# Patient Record
Sex: FEMALE | Race: WHITE | Employment: OTHER | URBAN - METROPOLITAN AREA
[De-identification: names, ages, dates, MRNs, and addresses within clinical notes are randomized per-mention and may not be internally consistent; named-entity substitution may affect disease eponyms.]

---

## 2018-04-25 ENCOUNTER — TELEPHONE (OUTPATIENT)
Dept: FAMILY MEDICINE CLINIC | Age: 68
End: 2018-04-25

## 2018-05-17 ENCOUNTER — OFFICE VISIT (OUTPATIENT)
Dept: FAMILY MEDICINE CLINIC | Age: 68
End: 2018-05-17
Payer: MEDICARE

## 2018-05-17 VITALS
WEIGHT: 210.8 LBS | BODY MASS INDEX: 35.99 KG/M2 | SYSTOLIC BLOOD PRESSURE: 136 MMHG | TEMPERATURE: 99.3 F | RESPIRATION RATE: 16 BRPM | DIASTOLIC BLOOD PRESSURE: 72 MMHG | HEIGHT: 64 IN | HEART RATE: 82 BPM | OXYGEN SATURATION: 96 %

## 2018-05-17 DIAGNOSIS — E03.9 ACQUIRED HYPOTHYROIDISM: ICD-10-CM

## 2018-05-17 DIAGNOSIS — I10 ESSENTIAL HYPERTENSION: Primary | ICD-10-CM

## 2018-05-17 DIAGNOSIS — E78.2 MIXED HYPERLIPIDEMIA: ICD-10-CM

## 2018-05-17 DIAGNOSIS — E11.59 TYPE 2 DIABETES MELLITUS WITH OTHER CIRCULATORY COMPLICATION, UNSPECIFIED LONG TERM INSULIN USE STATUS: ICD-10-CM

## 2018-05-17 DIAGNOSIS — I48.0 PAF (PAROXYSMAL ATRIAL FIBRILLATION) (HCC): ICD-10-CM

## 2018-05-17 PROCEDURE — G8417 CALC BMI ABV UP PARAM F/U: HCPCS | Performed by: FAMILY MEDICINE

## 2018-05-17 PROCEDURE — G8427 DOCREV CUR MEDS BY ELIG CLIN: HCPCS | Performed by: FAMILY MEDICINE

## 2018-05-17 PROCEDURE — 3017F COLORECTAL CA SCREEN DOC REV: CPT | Performed by: FAMILY MEDICINE

## 2018-05-17 PROCEDURE — 1036F TOBACCO NON-USER: CPT | Performed by: FAMILY MEDICINE

## 2018-05-17 PROCEDURE — G8400 PT W/DXA NO RESULTS DOC: HCPCS | Performed by: FAMILY MEDICINE

## 2018-05-17 PROCEDURE — 99214 OFFICE O/P EST MOD 30 MIN: CPT | Performed by: FAMILY MEDICINE

## 2018-05-17 PROCEDURE — 3044F HG A1C LEVEL LT 7.0%: CPT | Performed by: FAMILY MEDICINE

## 2018-05-17 PROCEDURE — 1090F PRES/ABSN URINE INCON ASSESS: CPT | Performed by: FAMILY MEDICINE

## 2018-05-17 PROCEDURE — 1123F ACP DISCUSS/DSCN MKR DOCD: CPT | Performed by: FAMILY MEDICINE

## 2018-05-17 PROCEDURE — 4040F PNEUMOC VAC/ADMIN/RCVD: CPT | Performed by: FAMILY MEDICINE

## 2018-05-17 PROCEDURE — 2022F DILAT RTA XM EVC RTNOPTHY: CPT | Performed by: FAMILY MEDICINE

## 2018-05-17 RX ORDER — FUROSEMIDE 20 MG/1
20 TABLET ORAL DAILY
Qty: 30 TABLET | Refills: 3 | Status: SHIPPED | OUTPATIENT
Start: 2018-05-17 | End: 2019-01-22

## 2018-09-12 LAB
BASOPHILS ABSOLUTE: NORMAL /ΜL
BASOPHILS RELATIVE PERCENT: NORMAL %
EOSINOPHILS ABSOLUTE: NORMAL /ΜL
EOSINOPHILS RELATIVE PERCENT: NORMAL %
HCT VFR BLD CALC: 45.8 % (ref 36–46)
HEMOGLOBIN: 14.8 G/DL (ref 12–16)
LYMPHOCYTES ABSOLUTE: NORMAL /ΜL
LYMPHOCYTES RELATIVE PERCENT: NORMAL %
MCH RBC QN AUTO: NORMAL PG
MCHC RBC AUTO-ENTMCNC: NORMAL G/DL
MCV RBC AUTO: NORMAL FL
MONOCYTES ABSOLUTE: NORMAL /ΜL
MONOCYTES RELATIVE PERCENT: NORMAL %
NEUTROPHILS ABSOLUTE: NORMAL /ΜL
NEUTROPHILS RELATIVE PERCENT: NORMAL %
PLATELET # BLD: NORMAL K/ΜL
PMV BLD AUTO: NORMAL FL
RBC # BLD: NORMAL 10^6/ΜL
WBC # BLD: NORMAL 10^3/ML

## 2018-10-09 DIAGNOSIS — I10 ESSENTIAL HYPERTENSION: ICD-10-CM

## 2018-10-11 PROBLEM — I10 ESSENTIAL HYPERTENSION: Status: ACTIVE | Noted: 2018-10-11

## 2019-01-07 ENCOUNTER — TELEPHONE (OUTPATIENT)
Dept: FAMILY MEDICINE CLINIC | Age: 69
End: 2019-01-07

## 2019-01-07 DIAGNOSIS — Z02.71 DISABILITY EXAMINATION: Primary | ICD-10-CM

## 2019-01-15 ENCOUNTER — TELEPHONE (OUTPATIENT)
Dept: FAMILY MEDICINE CLINIC | Age: 69
End: 2019-01-15

## 2019-01-15 RX ORDER — AMOXICILLIN 500 MG/1
4 TABLET, FILM COATED ORAL ONCE
Qty: 4 TABLET | Refills: 0 | Status: SHIPPED | OUTPATIENT
Start: 2019-01-15 | End: 2019-01-15

## 2019-01-22 ENCOUNTER — OFFICE VISIT (OUTPATIENT)
Dept: FAMILY MEDICINE CLINIC | Age: 69
End: 2019-01-22
Payer: MEDICARE

## 2019-01-22 ENCOUNTER — HOSPITAL ENCOUNTER (OUTPATIENT)
Age: 69
Discharge: HOME | End: 2019-01-24
Payer: MEDICARE

## 2019-01-22 VITALS
OXYGEN SATURATION: 94 % | RESPIRATION RATE: 16 BRPM | BODY MASS INDEX: 37.56 KG/M2 | SYSTOLIC BLOOD PRESSURE: 128 MMHG | TEMPERATURE: 98 F | DIASTOLIC BLOOD PRESSURE: 80 MMHG | HEART RATE: 93 BPM | WEIGHT: 220 LBS | HEIGHT: 64 IN

## 2019-01-22 DIAGNOSIS — E11.21 TYPE 2 DIABETES MELLITUS WITH DIABETIC NEPHROPATHY, WITHOUT LONG-TERM CURRENT USE OF INSULIN (HCC): ICD-10-CM

## 2019-01-22 DIAGNOSIS — B37.2 CANDIDAL INTERTRIGO: ICD-10-CM

## 2019-01-22 DIAGNOSIS — N18.30 CHRONIC KIDNEY DISEASE, STAGE III (MODERATE) (HCC): ICD-10-CM

## 2019-01-22 DIAGNOSIS — E66.09 CLASS 2 OBESITY DUE TO EXCESS CALORIES WITH BODY MASS INDEX (BMI) OF 37.0 TO 37.9 IN ADULT, UNSPECIFIED WHETHER SERIOUS COMORBIDITY PRESENT: ICD-10-CM

## 2019-01-22 DIAGNOSIS — R73.03 PREDIABETES: ICD-10-CM

## 2019-01-22 DIAGNOSIS — I10 ESSENTIAL HYPERTENSION: Primary | ICD-10-CM

## 2019-01-22 DIAGNOSIS — E11.59 TYPE 2 DIABETES MELLITUS WITH OTHER CIRCULATORY COMPLICATION, UNSPECIFIED WHETHER LONG TERM INSULIN USE (HCC): ICD-10-CM

## 2019-01-22 LAB
CHOLESTEROL, TOTAL: 228 MG/DL (ref 0–199)
HBA1C MFR BLD: 6.3 % (ref 4–5.6)
HDLC SERPL-MCNC: 38 MG/DL
LDL CHOLESTEROL CALCULATED: 113 MG/DL (ref 0–99)
TRIGL SERPL-MCNC: 383 MG/DL (ref 0–149)
VLDLC SERPL CALC-MCNC: 77 MG/DL

## 2019-01-22 PROCEDURE — G8400 PT W/DXA NO RESULTS DOC: HCPCS | Performed by: FAMILY MEDICINE

## 2019-01-22 PROCEDURE — 3017F COLORECTAL CA SCREEN DOC REV: CPT | Performed by: FAMILY MEDICINE

## 2019-01-22 PROCEDURE — 1101F PT FALLS ASSESS-DOCD LE1/YR: CPT | Performed by: FAMILY MEDICINE

## 2019-01-22 PROCEDURE — 83036 HEMOGLOBIN GLYCOSYLATED A1C: CPT

## 2019-01-22 PROCEDURE — 4040F PNEUMOC VAC/ADMIN/RCVD: CPT | Performed by: FAMILY MEDICINE

## 2019-01-22 PROCEDURE — G8427 DOCREV CUR MEDS BY ELIG CLIN: HCPCS | Performed by: FAMILY MEDICINE

## 2019-01-22 PROCEDURE — 1036F TOBACCO NON-USER: CPT | Performed by: FAMILY MEDICINE

## 2019-01-22 PROCEDURE — 2022F DILAT RTA XM EVC RTNOPTHY: CPT | Performed by: FAMILY MEDICINE

## 2019-01-22 PROCEDURE — 1123F ACP DISCUSS/DSCN MKR DOCD: CPT | Performed by: FAMILY MEDICINE

## 2019-01-22 PROCEDURE — 3044F HG A1C LEVEL LT 7.0%: CPT | Performed by: FAMILY MEDICINE

## 2019-01-22 PROCEDURE — 99214 OFFICE O/P EST MOD 30 MIN: CPT | Performed by: FAMILY MEDICINE

## 2019-01-22 PROCEDURE — 1090F PRES/ABSN URINE INCON ASSESS: CPT | Performed by: FAMILY MEDICINE

## 2019-01-22 PROCEDURE — 80061 LIPID PANEL: CPT

## 2019-01-22 PROCEDURE — G8417 CALC BMI ABV UP PARAM F/U: HCPCS | Performed by: FAMILY MEDICINE

## 2019-01-22 PROCEDURE — G8484 FLU IMMUNIZE NO ADMIN: HCPCS | Performed by: FAMILY MEDICINE

## 2019-01-22 RX ORDER — NYSTATIN 100000 [USP'U]/G
POWDER TOPICAL
Qty: 60 G | Refills: 0 | Status: SHIPPED
Start: 2019-01-22 | End: 2022-07-27

## 2019-01-22 RX ORDER — NYSTATIN 100000 [USP'U]/G
POWDER TOPICAL
Qty: 60 G | Refills: 0 | Status: SHIPPED | OUTPATIENT
Start: 2019-01-22 | End: 2019-01-22 | Stop reason: SDUPTHER

## 2019-01-22 ASSESSMENT — ENCOUNTER SYMPTOMS
BACK PAIN: 0
BLOOD IN STOOL: 0
PHOTOPHOBIA: 0
VOICE CHANGE: 0
COUGH: 0
SORE THROAT: 0
CONSTIPATION: 1
DIARRHEA: 0
TROUBLE SWALLOWING: 0
SINUS PRESSURE: 1
CHEST TIGHTNESS: 0
SHORTNESS OF BREATH: 1
RHINORRHEA: 1
EYE PAIN: 0
NAUSEA: 1
WHEEZING: 0
VOMITING: 0
ABDOMINAL PAIN: 0

## 2019-01-22 ASSESSMENT — PATIENT HEALTH QUESTIONNAIRE - PHQ9
SUM OF ALL RESPONSES TO PHQ QUESTIONS 1-9: 0
1. LITTLE INTEREST OR PLEASURE IN DOING THINGS: 0
SUM OF ALL RESPONSES TO PHQ QUESTIONS 1-9: 0
SUM OF ALL RESPONSES TO PHQ9 QUESTIONS 1 & 2: 0
2. FEELING DOWN, DEPRESSED OR HOPELESS: 0

## 2019-06-03 ENCOUNTER — OFFICE VISIT (OUTPATIENT)
Dept: FAMILY MEDICINE CLINIC | Age: 69
End: 2019-06-03
Payer: MEDICARE

## 2019-06-03 VITALS
HEART RATE: 70 BPM | HEIGHT: 64 IN | BODY MASS INDEX: 38.07 KG/M2 | TEMPERATURE: 98.1 F | DIASTOLIC BLOOD PRESSURE: 84 MMHG | SYSTOLIC BLOOD PRESSURE: 128 MMHG | RESPIRATION RATE: 16 BRPM | OXYGEN SATURATION: 95 % | WEIGHT: 223 LBS

## 2019-06-03 DIAGNOSIS — I48.20 CHRONIC ATRIAL FIBRILLATION (HCC): ICD-10-CM

## 2019-06-03 DIAGNOSIS — E78.2 ELEVATED TRIGLYCERIDES WITH HIGH CHOLESTEROL: ICD-10-CM

## 2019-06-03 DIAGNOSIS — R73.9 ELEVATED BLOOD SUGAR: ICD-10-CM

## 2019-06-03 DIAGNOSIS — R73.03 PREDIABETES: Primary | ICD-10-CM

## 2019-06-03 LAB
CREATININE URINE POCT: 200
HBA1C MFR BLD: 6.3 %
MICROALBUMIN/CREAT 24H UR: 150 MG/G{CREAT}
MICROALBUMIN/CREAT UR-RTO: NORMAL

## 2019-06-03 PROCEDURE — G8427 DOCREV CUR MEDS BY ELIG CLIN: HCPCS | Performed by: FAMILY MEDICINE

## 2019-06-03 PROCEDURE — 3017F COLORECTAL CA SCREEN DOC REV: CPT | Performed by: FAMILY MEDICINE

## 2019-06-03 PROCEDURE — 83036 HEMOGLOBIN GLYCOSYLATED A1C: CPT | Performed by: FAMILY MEDICINE

## 2019-06-03 PROCEDURE — 1090F PRES/ABSN URINE INCON ASSESS: CPT | Performed by: FAMILY MEDICINE

## 2019-06-03 PROCEDURE — 99214 OFFICE O/P EST MOD 30 MIN: CPT | Performed by: FAMILY MEDICINE

## 2019-06-03 PROCEDURE — 1123F ACP DISCUSS/DSCN MKR DOCD: CPT | Performed by: FAMILY MEDICINE

## 2019-06-03 PROCEDURE — G8400 PT W/DXA NO RESULTS DOC: HCPCS | Performed by: FAMILY MEDICINE

## 2019-06-03 PROCEDURE — G8417 CALC BMI ABV UP PARAM F/U: HCPCS | Performed by: FAMILY MEDICINE

## 2019-06-03 PROCEDURE — 1036F TOBACCO NON-USER: CPT | Performed by: FAMILY MEDICINE

## 2019-06-03 PROCEDURE — 82044 UR ALBUMIN SEMIQUANTITATIVE: CPT | Performed by: FAMILY MEDICINE

## 2019-06-03 PROCEDURE — 4040F PNEUMOC VAC/ADMIN/RCVD: CPT | Performed by: FAMILY MEDICINE

## 2019-06-03 NOTE — PROGRESS NOTES
6/3/2019    Chief Complaint   Patient presents with    Hypertension     routine medication check up        Gabriella Perez is a 71 y.o. patient that presents today for:    Pre-Diabetes Mellitus: Patient presents with elevated fasting blood sugar and evelated A1C. Symptoms: none. Symptoms have stabilized. Patient denies foot ulcerations, polydipsia, polyuria and visual disturbances. Evaluation to date has been included: hemoglobin A1C. Home sugars: patient does not check sugars. Treatment to date: no recent interventions. Current A1C is . Last A1C was . Aortic Valve Replacement/Atrial fibrillation:  Patient is here to follow up regarding a fib.  This is a/an chronic problem.  It is  stable and controlled at this time. Joyce Bello history of cardioversion or ablation.  Current anticoagulation includes none.  Patient does not have any active bleeding. Patient does not have chest pain, shortness of breath, palpitations, dizziness, light headedness, or syncope.  She does not see a Cardiologist regularly.  Last Cardiology progress note is  in medical record and, if available, was reviewed.  Last EKG done July 2017 and reviewed in medical record. REFUSE anticoag as they follow advice of company that manufactures her geovanna valve ring. Did follow-up with Cardiology and Vascular surgery. 1/22/19: Did have IVC filter removed on 9/27/18 per [facility] radiology. Has not had follow-up with Vascular surgery. No issues with a. Fib. No CP, diaphoresis, SOB, palp, HA, visual issues. She does not smoke. Patient's past medical, surgical, social and/or family history reviewed, updated in chart, and are non-contributory (unless otherwise stated). Medications and allergies also reviewed and updated in chart.     ROS Unless otherwise specified  Review of Systems - General ROS: negative for - chills, fatigue, fever, night sweats, sleep disturbance, weight gain or weight loss  Psychological ROS: negative for - anxiety, behavioral disorder, depression, hallucinations, irritability, memory difficulties, mood swings, sleep disturbances or suicidal ideation  ENT ROS: negative for - epistaxis, headaches, hearing change, nasal congestion, nasal discharge, nasal polyps, sinus pain, tinnitus, vertigo or visual changes  Hematological and Lymphatic ROS: negative for - bleeding problems, blood clots, fatigue or swollen lymph nodes  Respiratory ROS: negative for - cough, orthopnea, shortness of breath, sputum changes, tachypnea or wheezing  Cardiovascular ROS: negative for - chest pain, dyspnea on exertion, irregular heartbeat, loss of consciousness, palpitations, paroxysmal nocturnal dyspnea or rapid heart rate  Gastrointestinal ROS: negative for - abdominal pain, blood in stools, change in bowel habits, constipation, diarrhea, gas/bloating, heartburn or nausea/vomiting  Musculoskeletal ROS: negative for - joint pain, joint stiffness, joint swelling or muscle, back pain, bowel or bladder incontinence  Neurological ROS: negative for - behavioral changes, confusion, dizziness, headaches, memory loss, numbness/tingling, seizures or speech problems, weakness  Dermatological ROS: negative for - dry skin, mole changes, nail changes, pruritus, rash or skin lesion changes    Physical Exam  Wt Readings from Last 3 Encounters:   06/03/19 223 lb (101.2 kg)   01/22/19 220 lb (99.8 kg)   05/17/18 210 lb 12.8 oz (95.6 kg)     Temp Readings from Last 3 Encounters:   06/03/19 98.1 °F (36.7 °C)   01/22/19 98 °F (36.7 °C)   05/17/18 99.3 °F (37.4 °C)     BP Readings from Last 3 Encounters:   06/03/19 128/84   01/22/19 128/80   05/17/18 136/72     Pulse Readings from Last 3 Encounters:   06/03/19 70   01/22/19 93   05/17/18 82       General appearance: alert, well appearing, and in no distress, oriented to person, place, and time and normal appearing weight. CVS exam: normal rate, IRregular rhythm, normal S1, S2, no murmurs, rubs, clicks or gallops.   Radial pulses 2+ bilateral.  PT/DP pulse 2+ bilat. No C/C/E    Chest: clear to auscultation, no wheezes, rales or rhonchi, symmetric air entry. Abdomen: Soft, non-tender, non-distended, positive BS in all 4 quadrants    Extremities:Dorsalis pedis pulses palpated bilaterally, no clubbing, cyanosis, edema or erythema     SKIN: warm, dry, no lesions, jaundice, petechiae, pallor, cyanosis, ecchymosis    NEURO: gross motor exam normal by observation, gait normal    Mental status - alert, oriented to person, place, and time, normal mood, behavior, speech, dress, motor activity, and thought processes      Assessment/Plan  Michael Maria was seen today for hypertension. Diagnoses and all orders for this visit:    Prediabetes  -     POCT glycosylated hemoglobin (Hb A1C)  -     POCT microalbumin  -     Hemoglobin A1C; Future    Chronic atrial fibrillation (HCC)  -  NO anticoagulation, does REFUSE    Elevated blood sugar  -     CBC; Future  -     Comprehensive Metabolic Panel; Future  -     Hemoglobin A1C; Future    Elevated triglycerides with high cholesterol  -     Lipid Panel; Future    Quality & Risk Score Accuracy    Visit Dx:  I48.0 - PAF (paroxysmal atrial fibrillation) (HCC)  Assessment and plan:  Stable based upon symptoms and exam. Continue current treatment plan and follow up at least yearly. Visit Dx:  I48.2 - Chronic atrial fibrillation (HCC)  Assessment and plan:  Stable based upon symptoms and exam. Continue current treatment plan and follow up at least yearly. Last edited 06/03/19 13:12 EDT by Lindsey Lakhani, DO             Return in about 6 months (around 12/3/2019), or if symptoms worsen or fail to improve. Call or go to ED immediately if symptoms worsen or persist.    Educational materials and/or home exercises printed for patient's review and were included in patient instructions on his/her After Visit Summary and given to patient at the end of visit.        Counseled regarding above diagnosis, including possible risks and complications,  especially if left uncontrolled. Counseled regarding the possible side effects, risks, benefits and alternatives to treatment; patient and/or guardian verbalizes understanding, agrees, feels comfortable with and wishes to proceed with above treatment plan. Advised patient to call with any new medication issues, and read all Rx info from pharmacy to assure aware of all possible risks and side effects of medication before taking. Reviewed age and gender appropriate health screening exams and vaccinations. Advised patient regarding importance of keeping up with recommended health maintenance and to schedule as soon as possible if overdue, as this is important in assessing for undiagnosed pathology, especially cancer, as well as protecting against potentially harmful/life threatening disease. Patient and/or guardian verbalizes understanding and agrees with above counseling, assessment and plan. All questions answered.       Lu Garcia, DO

## 2019-12-31 ENCOUNTER — OFFICE VISIT (OUTPATIENT)
Dept: FAMILY MEDICINE CLINIC | Age: 69
End: 2019-12-31
Payer: MEDICARE

## 2019-12-31 VITALS
BODY MASS INDEX: 39.1 KG/M2 | DIASTOLIC BLOOD PRESSURE: 78 MMHG | HEART RATE: 67 BPM | RESPIRATION RATE: 16 BRPM | TEMPERATURE: 97.5 F | OXYGEN SATURATION: 98 % | SYSTOLIC BLOOD PRESSURE: 118 MMHG | WEIGHT: 227.8 LBS

## 2019-12-31 DIAGNOSIS — N39.0 URINARY TRACT INFECTION WITHOUT HEMATURIA, SITE UNSPECIFIED: Primary | ICD-10-CM

## 2019-12-31 LAB
BILIRUBIN, POC: NORMAL
BLOOD URINE, POC: NORMAL
CLARITY, POC: CLEAR
COLOR, POC: NORMAL
GLUCOSE URINE, POC: NORMAL
KETONES, POC: NORMAL
LEUKOCYTE EST, POC: NORMAL
NITRITE, POC: NORMAL
PH, POC: 5
PROTEIN, POC: NORMAL
SPECIFIC GRAVITY, POC: >=1.03
UROBILINOGEN, POC: 0.2

## 2019-12-31 PROCEDURE — 1036F TOBACCO NON-USER: CPT | Performed by: FAMILY MEDICINE

## 2019-12-31 PROCEDURE — 1123F ACP DISCUSS/DSCN MKR DOCD: CPT | Performed by: FAMILY MEDICINE

## 2019-12-31 PROCEDURE — 99213 OFFICE O/P EST LOW 20 MIN: CPT | Performed by: FAMILY MEDICINE

## 2019-12-31 PROCEDURE — G8400 PT W/DXA NO RESULTS DOC: HCPCS | Performed by: FAMILY MEDICINE

## 2019-12-31 PROCEDURE — 4040F PNEUMOC VAC/ADMIN/RCVD: CPT | Performed by: FAMILY MEDICINE

## 2019-12-31 PROCEDURE — G8417 CALC BMI ABV UP PARAM F/U: HCPCS | Performed by: FAMILY MEDICINE

## 2019-12-31 PROCEDURE — G8427 DOCREV CUR MEDS BY ELIG CLIN: HCPCS | Performed by: FAMILY MEDICINE

## 2019-12-31 PROCEDURE — 81002 URINALYSIS NONAUTO W/O SCOPE: CPT | Performed by: FAMILY MEDICINE

## 2019-12-31 PROCEDURE — G8484 FLU IMMUNIZE NO ADMIN: HCPCS | Performed by: FAMILY MEDICINE

## 2019-12-31 PROCEDURE — 1090F PRES/ABSN URINE INCON ASSESS: CPT | Performed by: FAMILY MEDICINE

## 2019-12-31 PROCEDURE — 3017F COLORECTAL CA SCREEN DOC REV: CPT | Performed by: FAMILY MEDICINE

## 2020-05-30 ENCOUNTER — APPOINTMENT (OUTPATIENT)
Dept: CT IMAGING | Age: 70
End: 2020-05-30
Payer: MEDICARE

## 2020-05-30 ENCOUNTER — HOSPITAL ENCOUNTER (EMERGENCY)
Age: 70
Discharge: HOME | End: 2020-05-30
Attending: EMERGENCY MEDICINE
Payer: MEDICARE

## 2020-05-30 VITALS
HEART RATE: 70 BPM | OXYGEN SATURATION: 95 % | DIASTOLIC BLOOD PRESSURE: 67 MMHG | WEIGHT: 228 LBS | BODY MASS INDEX: 38.93 KG/M2 | RESPIRATION RATE: 16 BRPM | TEMPERATURE: 97.8 F | HEIGHT: 64 IN | SYSTOLIC BLOOD PRESSURE: 140 MMHG

## 2020-05-30 LAB
ALBUMIN SERPL-MCNC: 4.1 G/DL (ref 3.5–5.2)
ALP BLD-CCNC: 67 U/L (ref 35–104)
ALT SERPL-CCNC: 17 U/L (ref 0–32)
AMORPHOUS: ABNORMAL
ANION GAP SERPL CALCULATED.3IONS-SCNC: 8 MMOL/L (ref 7–16)
AST SERPL-CCNC: 16 U/L (ref 0–31)
BACTERIA: ABNORMAL /HPF
BASOPHILS ABSOLUTE: 0.04 E9/L (ref 0–0.2)
BASOPHILS RELATIVE PERCENT: 0.4 % (ref 0–2)
BILIRUB SERPL-MCNC: 0.4 MG/DL (ref 0–1.2)
BILIRUBIN URINE: ABNORMAL
BLOOD, URINE: ABNORMAL
BUN BLDV-MCNC: 23 MG/DL (ref 8–23)
CALCIUM SERPL-MCNC: 9.4 MG/DL (ref 8.6–10.2)
CHLORIDE BLD-SCNC: 99 MMOL/L (ref 98–107)
CLARITY: CLEAR
CO2: 27 MMOL/L (ref 22–29)
COLOR: YELLOW
CREAT SERPL-MCNC: 1.5 MG/DL (ref 0.5–1)
EOSINOPHILS ABSOLUTE: 0.1 E9/L (ref 0.05–0.5)
EOSINOPHILS RELATIVE PERCENT: 0.9 % (ref 0–6)
EPITHELIAL CELLS, UA: ABNORMAL /HPF
GFR AFRICAN AMERICAN: 42
GFR NON-AFRICAN AMERICAN: 34 ML/MIN/1.73
GLUCOSE BLD-MCNC: 138 MG/DL (ref 74–99)
GLUCOSE URINE: NEGATIVE MG/DL
HCT VFR BLD CALC: 46.2 % (ref 34–48)
HEMOGLOBIN: 15.2 G/DL (ref 11.5–15.5)
IMMATURE GRANULOCYTES #: 0.05 E9/L
IMMATURE GRANULOCYTES %: 0.4 % (ref 0–5)
KETONES, URINE: NEGATIVE MG/DL
LACTIC ACID: 0.9 MMOL/L (ref 0.5–2.2)
LEUKOCYTE ESTERASE, URINE: ABNORMAL
LYMPHOCYTES ABSOLUTE: 1.04 E9/L (ref 1.5–4)
LYMPHOCYTES RELATIVE PERCENT: 9.3 % (ref 20–42)
MCH RBC QN AUTO: 30.8 PG (ref 26–35)
MCHC RBC AUTO-ENTMCNC: 32.9 % (ref 32–34.5)
MCV RBC AUTO: 93.7 FL (ref 80–99.9)
MONOCYTES ABSOLUTE: 0.84 E9/L (ref 0.1–0.95)
MONOCYTES RELATIVE PERCENT: 7.5 % (ref 2–12)
NEUTROPHILS ABSOLUTE: 9.16 E9/L (ref 1.8–7.3)
NEUTROPHILS RELATIVE PERCENT: 81.5 % (ref 43–80)
NITRITE, URINE: NEGATIVE
PDW BLD-RTO: 14.1 FL (ref 11.5–15)
PH UA: 6 (ref 5–9)
PLATELET # BLD: 194 E9/L (ref 130–450)
PMV BLD AUTO: 10.4 FL (ref 7–12)
POTASSIUM SERPL-SCNC: 4.6 MMOL/L (ref 3.5–5)
PROTEIN UA: >=300 MG/DL
RBC # BLD: 4.93 E12/L (ref 3.5–5.5)
RBC UA: ABNORMAL /HPF (ref 0–2)
SODIUM BLD-SCNC: 134 MMOL/L (ref 132–146)
SPECIFIC GRAVITY UA: 1.02 (ref 1–1.03)
TOTAL PROTEIN: 7.8 G/DL (ref 6.4–8.3)
UROBILINOGEN, URINE: 0.2 E.U./DL
WBC # BLD: 11.2 E9/L (ref 4.5–11.5)
WBC UA: ABNORMAL /HPF (ref 0–5)

## 2020-05-30 PROCEDURE — 2580000003 HC RX 258: Performed by: EMERGENCY MEDICINE

## 2020-05-30 PROCEDURE — 87088 URINE BACTERIA CULTURE: CPT

## 2020-05-30 PROCEDURE — 85025 COMPLETE CBC W/AUTO DIFF WBC: CPT

## 2020-05-30 PROCEDURE — 81001 URINALYSIS AUTO W/SCOPE: CPT

## 2020-05-30 PROCEDURE — 83605 ASSAY OF LACTIC ACID: CPT

## 2020-05-30 PROCEDURE — 74176 CT ABD & PELVIS W/O CONTRAST: CPT

## 2020-05-30 PROCEDURE — 99284 EMERGENCY DEPT VISIT MOD MDM: CPT

## 2020-05-30 PROCEDURE — 6370000000 HC RX 637 (ALT 250 FOR IP): Performed by: EMERGENCY MEDICINE

## 2020-05-30 PROCEDURE — 80053 COMPREHEN METABOLIC PANEL: CPT

## 2020-05-30 PROCEDURE — 96375 TX/PRO/DX INJ NEW DRUG ADDON: CPT

## 2020-05-30 PROCEDURE — 6360000002 HC RX W HCPCS: Performed by: EMERGENCY MEDICINE

## 2020-05-30 PROCEDURE — 96374 THER/PROPH/DIAG INJ IV PUSH: CPT

## 2020-05-30 RX ORDER — CEFDINIR 300 MG/1
300 CAPSULE ORAL 2 TIMES DAILY
Qty: 14 CAPSULE | Refills: 0 | Status: SHIPPED | OUTPATIENT
Start: 2020-05-30 | End: 2020-06-06

## 2020-05-30 RX ORDER — ONDANSETRON 2 MG/ML
4 INJECTION INTRAMUSCULAR; INTRAVENOUS ONCE
Status: COMPLETED | OUTPATIENT
Start: 2020-05-30 | End: 2020-05-30

## 2020-05-30 RX ORDER — METRONIDAZOLE 500 MG/1
500 TABLET ORAL ONCE
Status: COMPLETED | OUTPATIENT
Start: 2020-05-30 | End: 2020-05-30

## 2020-05-30 RX ORDER — METRONIDAZOLE 500 MG/1
500 TABLET ORAL 4 TIMES DAILY
Qty: 28 TABLET | Refills: 0 | Status: SHIPPED | OUTPATIENT
Start: 2020-05-30 | End: 2020-06-06

## 2020-05-30 RX ORDER — KETOROLAC TROMETHAMINE 30 MG/ML
15 INJECTION, SOLUTION INTRAMUSCULAR; INTRAVENOUS ONCE
Status: COMPLETED | OUTPATIENT
Start: 2020-05-30 | End: 2020-05-30

## 2020-05-30 RX ADMIN — KETOROLAC TROMETHAMINE 15 MG: 30 INJECTION, SOLUTION INTRAMUSCULAR at 11:13

## 2020-05-30 RX ADMIN — METRONIDAZOLE 500 MG: 500 TABLET, FILM COATED ORAL at 12:49

## 2020-05-30 RX ADMIN — WATER 1 G: 1 INJECTION INTRAMUSCULAR; INTRAVENOUS; SUBCUTANEOUS at 12:49

## 2020-05-30 RX ADMIN — ONDANSETRON 4 MG: 2 INJECTION INTRAMUSCULAR; INTRAVENOUS at 11:13

## 2020-05-30 ASSESSMENT — PAIN DESCRIPTION - LOCATION: LOCATION: GROIN

## 2020-05-30 ASSESSMENT — PAIN SCALES - GENERAL: PAINLEVEL_OUTOF10: 3

## 2020-05-30 ASSESSMENT — PAIN DESCRIPTION - PAIN TYPE: TYPE: ACUTE PAIN

## 2020-05-30 NOTE — ED NOTES
Pt refusing to wear mask as she states she has a scarred lung and she will \"black out\" if she wears it     [facility].  [facility]  05/30/20 2754

## 2020-05-31 LAB — URINE CULTURE, ROUTINE: NORMAL

## 2020-06-16 ENCOUNTER — OFFICE VISIT (OUTPATIENT)
Dept: FAMILY MEDICINE CLINIC | Age: 70
End: 2020-06-16
Payer: MEDICARE

## 2020-06-16 VITALS
OXYGEN SATURATION: 94 % | TEMPERATURE: 98.1 F | WEIGHT: 226 LBS | HEIGHT: 64 IN | DIASTOLIC BLOOD PRESSURE: 70 MMHG | HEART RATE: 89 BPM | RESPIRATION RATE: 18 BRPM | SYSTOLIC BLOOD PRESSURE: 120 MMHG | BODY MASS INDEX: 38.58 KG/M2

## 2020-06-16 LAB — HBA1C MFR BLD: 6.7 %

## 2020-06-16 PROCEDURE — 4040F PNEUMOC VAC/ADMIN/RCVD: CPT | Performed by: FAMILY MEDICINE

## 2020-06-16 PROCEDURE — 83036 HEMOGLOBIN GLYCOSYLATED A1C: CPT | Performed by: FAMILY MEDICINE

## 2020-06-16 PROCEDURE — G8427 DOCREV CUR MEDS BY ELIG CLIN: HCPCS | Performed by: FAMILY MEDICINE

## 2020-06-16 PROCEDURE — 1090F PRES/ABSN URINE INCON ASSESS: CPT | Performed by: FAMILY MEDICINE

## 2020-06-16 PROCEDURE — 3044F HG A1C LEVEL LT 7.0%: CPT | Performed by: FAMILY MEDICINE

## 2020-06-16 PROCEDURE — 2022F DILAT RTA XM EVC RTNOPTHY: CPT | Performed by: FAMILY MEDICINE

## 2020-06-16 PROCEDURE — 3017F COLORECTAL CA SCREEN DOC REV: CPT | Performed by: FAMILY MEDICINE

## 2020-06-16 PROCEDURE — 1123F ACP DISCUSS/DSCN MKR DOCD: CPT | Performed by: FAMILY MEDICINE

## 2020-06-16 PROCEDURE — 1036F TOBACCO NON-USER: CPT | Performed by: FAMILY MEDICINE

## 2020-06-16 PROCEDURE — G8417 CALC BMI ABV UP PARAM F/U: HCPCS | Performed by: FAMILY MEDICINE

## 2020-06-16 PROCEDURE — G8400 PT W/DXA NO RESULTS DOC: HCPCS | Performed by: FAMILY MEDICINE

## 2020-06-16 PROCEDURE — 99214 OFFICE O/P EST MOD 30 MIN: CPT | Performed by: FAMILY MEDICINE

## 2020-06-16 ASSESSMENT — PATIENT HEALTH QUESTIONNAIRE - PHQ9
SUM OF ALL RESPONSES TO PHQ QUESTIONS 1-9: 0
SUM OF ALL RESPONSES TO PHQ QUESTIONS 1-9: 0
SUM OF ALL RESPONSES TO PHQ9 QUESTIONS 1 & 2: 0
2. FEELING DOWN, DEPRESSED OR HOPELESS: 0
1. LITTLE INTEREST OR PLEASURE IN DOING THINGS: 0

## 2020-06-16 NOTE — PROGRESS NOTES
ecchymosis    NEURO: gross motor exam normal by observation, gait normal    Mental status - alert, oriented to person, place, and time, normal mood, behavior, speech, dress, motor activity, and thought processes      Assessment/Plan  Zoya Dempsey was seen today for diverticulitis. Diagnoses and all orders for this visit:    Diabetes mellitus type 2 in obese ([address]  -     POCT glycosylated hemoglobin (Hb A1C)  - Refuse meds at this thime. SHe and her  have been eating poorly and she wishes to put more effort to diet and exercise. - ADA diets given and counseled on DM    Diverticulitis  Much Improved        I did spend more than 25 minutes in direct patient care discussing and treating above diagnoses. Return in about 3 months (around 9/16/2020). Lu Garcia, DO    Call or go to ED immediately if symptoms worsen or persist.    Educational materials and/or home exercises printed for patient's review and were included in patient instructions on his/her After Visit Summary and given to patient at the end of visit. Counseled regarding above diagnosis, including possible risks and complications,  especially if left uncontrolled. Counseled regarding the possible side effects, risks, benefits and alternatives to treatment; patient and/or guardian verbalizes understanding, agrees, feels comfortable with and wishes to proceed with above treatment plan. Advised patient to call with any new medication issues, and read all Rx info from pharmacy to assure aware of all possible risks and side effects of medication before taking. Reviewed age and gender appropriate health screening exams and vaccinations. Advised patient regarding importance of keeping up with recommended health maintenance and to schedule as soon as possible if overdue, as this is important in assessing for undiagnosed pathology, especially cancer, as well as protecting against potentially harmful/life threatening disease. Patient and/or guardian verbalizes understanding and agrees with above counseling, assessment and plan. All questions answered.

## 2020-07-30 ENCOUNTER — TELEPHONE (OUTPATIENT)
Dept: ADMINISTRATIVE | Age: 70
End: 2020-07-30

## 2020-07-30 NOTE — TELEPHONE ENCOUNTER
Pt has been in touch with Dr. Debora Roy, she needs a referral to a gynecologist at the [facility], pt states Dr knows about it. Please contact pt for further information or instructions.

## 2020-10-04 ENCOUNTER — HOSPITAL ENCOUNTER (EMERGENCY)
Age: 70
Discharge: HOME | End: 2020-10-05
Attending: EMERGENCY MEDICINE
Payer: MEDICARE

## 2020-10-04 PROCEDURE — 99282 EMERGENCY DEPT VISIT SF MDM: CPT

## 2020-10-04 PROCEDURE — 99283 EMERGENCY DEPT VISIT LOW MDM: CPT

## 2020-10-04 PROCEDURE — 96375 TX/PRO/DX INJ NEW DRUG ADDON: CPT

## 2020-10-04 PROCEDURE — 96374 THER/PROPH/DIAG INJ IV PUSH: CPT

## 2020-10-04 RX ORDER — MECLIZINE HYDROCHLORIDE 25 MG/1
TABLET ORAL 2 TIMES DAILY PRN
COMMUNITY
End: 2022-07-27

## 2020-10-04 ASSESSMENT — PAIN SCALES - GENERAL: PAINLEVEL_OUTOF10: 6

## 2020-10-05 ENCOUNTER — APPOINTMENT (OUTPATIENT)
Dept: GENERAL RADIOLOGY | Age: 70
End: 2020-10-05
Payer: MEDICARE

## 2020-10-05 ENCOUNTER — APPOINTMENT (OUTPATIENT)
Dept: CT IMAGING | Age: 70
End: 2020-10-05
Payer: MEDICARE

## 2020-10-05 VITALS
WEIGHT: 225 LBS | HEART RATE: 63 BPM | TEMPERATURE: 97.1 F | HEIGHT: 64 IN | OXYGEN SATURATION: 94 % | RESPIRATION RATE: 16 BRPM | SYSTOLIC BLOOD PRESSURE: 134 MMHG | BODY MASS INDEX: 38.41 KG/M2 | DIASTOLIC BLOOD PRESSURE: 66 MMHG

## 2020-10-05 LAB
ALBUMIN SERPL-MCNC: 3.9 G/DL (ref 3.5–5.2)
ALP BLD-CCNC: 80 U/L (ref 35–104)
ALT SERPL-CCNC: 18 U/L (ref 0–32)
ANION GAP SERPL CALCULATED.3IONS-SCNC: 10 MMOL/L (ref 7–16)
AST SERPL-CCNC: 16 U/L (ref 0–31)
BASOPHILS ABSOLUTE: 0.05 E9/L (ref 0–0.2)
BASOPHILS RELATIVE PERCENT: 0.7 % (ref 0–2)
BILIRUB SERPL-MCNC: <0.2 MG/DL (ref 0–1.2)
BUN BLDV-MCNC: 29 MG/DL (ref 8–23)
CALCIUM SERPL-MCNC: 9.4 MG/DL (ref 8.6–10.2)
CHLORIDE BLD-SCNC: 104 MMOL/L (ref 98–107)
CO2: 23 MMOL/L (ref 22–29)
CREAT SERPL-MCNC: 1.5 MG/DL (ref 0.5–1)
EKG ATRIAL RATE: 65 BPM
EKG Q-T INTERVAL: 484 MS
EKG QRS DURATION: 182 MS
EKG QTC CALCULATION (BAZETT): 511 MS
EKG R AXIS: 44 DEGREES
EKG T AXIS: 42 DEGREES
EKG VENTRICULAR RATE: 67 BPM
EOSINOPHILS ABSOLUTE: 0.24 E9/L (ref 0.05–0.5)
EOSINOPHILS RELATIVE PERCENT: 3.4 % (ref 0–6)
GFR AFRICAN AMERICAN: 41
GFR NON-AFRICAN AMERICAN: 34 ML/MIN/1.73
GLUCOSE BLD-MCNC: 139 MG/DL (ref 74–99)
HCT VFR BLD CALC: 44.8 % (ref 34–48)
HEMOGLOBIN: 14.4 G/DL (ref 11.5–15.5)
IMMATURE GRANULOCYTES #: 0.02 E9/L
IMMATURE GRANULOCYTES %: 0.3 % (ref 0–5)
LYMPHOCYTES ABSOLUTE: 1.35 E9/L (ref 1.5–4)
LYMPHOCYTES RELATIVE PERCENT: 19.1 % (ref 20–42)
MCH RBC QN AUTO: 30.3 PG (ref 26–35)
MCHC RBC AUTO-ENTMCNC: 32.1 % (ref 32–34.5)
MCV RBC AUTO: 94.1 FL (ref 80–99.9)
MONOCYTES ABSOLUTE: 0.5 E9/L (ref 0.1–0.95)
MONOCYTES RELATIVE PERCENT: 7.1 % (ref 2–12)
NEUTROPHILS ABSOLUTE: 4.9 E9/L (ref 1.8–7.3)
NEUTROPHILS RELATIVE PERCENT: 69.4 % (ref 43–80)
PDW BLD-RTO: 13.7 FL (ref 11.5–15)
PLATELET # BLD: 183 E9/L (ref 130–450)
PMV BLD AUTO: 10.1 FL (ref 7–12)
POTASSIUM REFLEX MAGNESIUM: 4.4 MMOL/L (ref 3.5–5)
RBC # BLD: 4.76 E12/L (ref 3.5–5.5)
SODIUM BLD-SCNC: 137 MMOL/L (ref 132–146)
TOTAL PROTEIN: 7.3 G/DL (ref 6.4–8.3)
TROPONIN: <0.01 NG/ML (ref 0–0.03)
WBC # BLD: 7.1 E9/L (ref 4.5–11.5)

## 2020-10-05 PROCEDURE — 84484 ASSAY OF TROPONIN QUANT: CPT

## 2020-10-05 PROCEDURE — 6360000004 HC RX CONTRAST MEDICATION: Performed by: RADIOLOGY

## 2020-10-05 PROCEDURE — 93005 ELECTROCARDIOGRAM TRACING: CPT | Performed by: EMERGENCY MEDICINE

## 2020-10-05 PROCEDURE — 2580000003 HC RX 258: Performed by: RADIOLOGY

## 2020-10-05 PROCEDURE — 70496 CT ANGIOGRAPHY HEAD: CPT

## 2020-10-05 PROCEDURE — 85025 COMPLETE CBC W/AUTO DIFF WBC: CPT

## 2020-10-05 PROCEDURE — 96374 THER/PROPH/DIAG INJ IV PUSH: CPT

## 2020-10-05 PROCEDURE — 2580000003 HC RX 258: Performed by: EMERGENCY MEDICINE

## 2020-10-05 PROCEDURE — 80053 COMPREHEN METABOLIC PANEL: CPT

## 2020-10-05 PROCEDURE — 96375 TX/PRO/DX INJ NEW DRUG ADDON: CPT

## 2020-10-05 PROCEDURE — 71045 X-RAY EXAM CHEST 1 VIEW: CPT

## 2020-10-05 PROCEDURE — 6360000002 HC RX W HCPCS: Performed by: EMERGENCY MEDICINE

## 2020-10-05 RX ORDER — 0.9 % SODIUM CHLORIDE 0.9 %
1000 INTRAVENOUS SOLUTION INTRAVENOUS ONCE
Status: COMPLETED | OUTPATIENT
Start: 2020-10-05 | End: 2020-10-05

## 2020-10-05 RX ORDER — FLUTICASONE PROPIONATE 50 MCG
2 SPRAY, SUSPENSION (ML) NASAL DAILY
Qty: 1 BOTTLE | Refills: 0 | Status: SHIPPED | OUTPATIENT
Start: 2020-10-05 | End: 2022-07-27 | Stop reason: ALTCHOICE

## 2020-10-05 RX ORDER — AMOXICILLIN AND CLAVULANATE POTASSIUM 875; 125 MG/1; MG/1
1 TABLET, FILM COATED ORAL 2 TIMES DAILY WITH MEALS
Qty: 20 TABLET | Refills: 0 | Status: SHIPPED | OUTPATIENT
Start: 2020-10-05 | End: 2020-10-15

## 2020-10-05 RX ORDER — DIPHENHYDRAMINE HYDROCHLORIDE 50 MG/ML
12.5 INJECTION INTRAMUSCULAR; INTRAVENOUS ONCE
Status: COMPLETED | OUTPATIENT
Start: 2020-10-05 | End: 2020-10-05

## 2020-10-05 RX ORDER — SODIUM CHLORIDE 0.9 % (FLUSH) 0.9 %
10 SYRINGE (ML) INJECTION 2 TIMES DAILY
Status: DISCONTINUED | OUTPATIENT
Start: 2020-10-05 | End: 2020-10-05 | Stop reason: HOSPADM

## 2020-10-05 RX ORDER — METOCLOPRAMIDE HYDROCHLORIDE 5 MG/ML
10 INJECTION INTRAMUSCULAR; INTRAVENOUS ONCE
Status: COMPLETED | OUTPATIENT
Start: 2020-10-05 | End: 2020-10-05

## 2020-10-05 RX ADMIN — DIPHENHYDRAMINE HYDROCHLORIDE 12.5 MG: 50 INJECTION, SOLUTION INTRAMUSCULAR; INTRAVENOUS at 00:38

## 2020-10-05 RX ADMIN — Medication 10 ML: at 01:32

## 2020-10-05 RX ADMIN — METOCLOPRAMIDE 10 MG: 5 INJECTION, SOLUTION INTRAMUSCULAR; INTRAVENOUS at 00:38

## 2020-10-05 RX ADMIN — SODIUM CHLORIDE 1000 ML: 9 INJECTION, SOLUTION INTRAVENOUS at 00:37

## 2020-10-05 RX ADMIN — IOPAMIDOL 80 ML: 755 INJECTION, SOLUTION INTRAVENOUS at 01:37

## 2020-10-05 NOTE — ED PROVIDER NOTES
HPI:  10/5/20,   Time: 12:09 AM EDT       Liz Oro is a 79 y.o. female presenting to the ED for headache and dizziness, beginning 3 days ago. The complaint has been intermittent, moderate in severity, and worsened by nothing. The patient states that she has been having dizziness intermittently for the entire last month. She states that currently she is not dizzy and has had no dizziness today. She states that today she came in because she had a headache. She states that over the last 3 days she has been having sharp stabbing pains on the left side of her face radiating back on the left posterior side of her head. She states that that is sharp and stabbing. She states that every night it has been occurring but it self resolves and throughout the day she has no symptoms. Due to the patient's pain tonight she came to the ED to be evaluated. She states that now her pain is subsiding and she is almost back to normal.  No numbness tingling. No focal neuro deficits. No chest pain shortness of breath. No other systemic symptoms. Review of Systems:   Pertinent positives and negatives are stated within HPI, all other systems reviewed and are negative.          --------------------------------------------- PAST HISTORY ---------------------------------------------  Past Medical History:  has a past medical history of A-fib ([address] ARF (acute renal failure) ([facility] [address] Cancer ([facility] [address] Cardiac dysfunction, Diabetes mellitus ([facility] [address] Diverticulitis, History of deep vein thrombosis, Hx of blood clots, Poor venous access, S/P AVR, S/P IVC filter, Traumatic seroma of left thigh ([address] and Ventilator dependent ([address] Past Surgical History:  has a past surgical history that includes Cardiac surgery; Gastrostomy tube placement (09/26/2016); thoracentesis (10/14/2016); Colonoscopy; Endoscopy, colon, diagnostic; Aortic valve replacement; and Vena Cava Filter Placement (Right, 10/14/2016).     Social History:  reports that she has never smoked. She has never used smokeless tobacco. She reports that she does not drink alcohol or use drugs. Family History: family history is not on file. The patients home medications have been reviewed. Allergies: Ciprofloxacin; Protamine; Hydrocodone-acetaminophen; Oxycodone-acetaminophen; and Vancomycin        ---------------------------------------------------PHYSICAL EXAM--------------------------------------    Constitutional/General: Alert and oriented x3, well appearing, non toxic in NAD  Head: Normocephalic and atraumatic. Mild tenderness palpation overlying the left frontal sinus, nasal congestion noted  Eyes: PERRL, EOMI, conjunctive normal, sclera non icteric  Mouth: Oropharynx clear, handling secretions, no trismus, no asymmetry of the posterior oropharynx or uvular edema  Neck: Supple, full ROM, non tender to palpation in the midline, no stridor, no crepitus, no meningeal signs  Respiratory: Lungs clear to auscultation bilaterally, no wheezes, rales, or rhonchi. Not in respiratory distress  Cardiovascular:  Regular rate. Regular rhythm. No murmurs, gallops, or rubs. 2+ distal pulses  Chest: No chest wall tenderness  GI:  Abdomen Soft, Non tender, Non distended. +BS. No organomegaly, no palpable masses,  No rebound, guarding, or rigidity. Musculoskeletal: Moves all extremities x 4. Warm and well perfused, no clubbing, cyanosis, or edema. Capillary refill <3 seconds  Integument: skin warm and dry. No rashes. Lymphatic: no lymphadenopathy noted  Neurologic: GCS 15, no focal deficits, symmetric strength 5/5 in the upper and lower extremities bilaterally  Psychiatric: Normal Affect    -------------------------------------------------- RESULTS -------------------------------------------------  I have personally reviewed all laboratory and imaging results for this patient. Results are listed below.      LABS:  Results for orders placed or performed during the hospital encounter of 10/04/20   CBC Auto Differential   Result Value Ref Range    WBC 7.1 4.5 - 11.5 E9/L    RBC 4.76 3.50 - 5.50 E12/L    Hemoglobin 14.4 11.5 - 15.5 g/dL    Hematocrit 44.8 34.0 - 48.0 %    MCV 94.1 80.0 - 99.9 fL    MCH 30.3 26.0 - 35.0 pg    MCHC 32.1 32.0 - 34.5 %    RDW 13.7 11.5 - 15.0 fL    Platelets 198 693 - 631 E9/L    MPV 10.1 7.0 - 12.0 fL    Neutrophils % 69.4 43.0 - 80.0 %    Immature Granulocytes % 0.3 0.0 - 5.0 %    Lymphocytes % 19.1 (L) 20.0 - 42.0 %    Monocytes % 7.1 2.0 - 12.0 %    Eosinophils % 3.4 0.0 - 6.0 %    Basophils % 0.7 0.0 - 2.0 %    Neutrophils Absolute 4.90 1.80 - 7.30 E9/L    Immature Granulocytes # 0.02 E9/L    Lymphocytes Absolute 1.35 (L) 1.50 - 4.00 E9/L    Monocytes Absolute 0.50 0.10 - 0.95 E9/L    Eosinophils Absolute 0.24 0.05 - 0.50 E9/L    Basophils Absolute 0.05 0.00 - 0.20 E9/L   Comprehensive Metabolic Panel w/ Reflex to MG   Result Value Ref Range    Sodium 137 132 - 146 mmol/L    Potassium reflex Magnesium 4.4 3.5 - 5.0 mmol/L    Chloride 104 98 - 107 mmol/L    CO2 23 22 - 29 mmol/L    Anion Gap 10 7 - 16 mmol/L    Glucose 139 (H) 74 - 99 mg/dL    BUN 29 (H) 8 - 23 mg/dL    CREATININE 1.5 (H) 0.5 - 1.0 mg/dL    GFR Non-African American 34 >=60 mL/min/1.73    GFR African American 41     Calcium 9.4 8.6 - 10.2 mg/dL    Total Protein 7.3 6.4 - 8.3 g/dL    Alb 3.9 3.5 - 5.2 g/dL    Total Bilirubin <0.2 0.0 - 1.2 mg/dL    Alkaline Phosphatase 80 35 - 104 U/L    ALT 18 0 - 32 U/L    AST 16 0 - 31 U/L   Troponin   Result Value Ref Range    Troponin <0.01 0.00 - 0.03 ng/mL       RADIOLOGY:  Interpreted by Radiologist.  CTA HEAD W CONTRAST   Final Result   Unremarkable CTA of the head. Moderate cerebral white matter chronic microvascular ischemic disease. XR CHEST PORTABLE   Final Result   No definite acute disease. EKG:  This EKG is signed and interpreted by the EP. EKG shows a fib at 67 bpm.  Right bundle branch block.   No ST elevations or depressions. No STEMI. Unchanged from previous    ------------------------- NURSING NOTES AND VITALS REVIEWED ---------------------------   The nursing notes within the ED encounter and vital signs as below have been reviewed by myself. BP (!) 157/73   Pulse 65   Temp 97.1 °F (36.2 °C) (Infrared)   Resp 16   Ht 5' 4\" (1.626 m)   Wt 225 lb (102.1 kg)   SpO2 93%   BMI 38.62 kg/m²   Oxygen Saturation Interpretation: Normal    The patients available past medical records and past encounters were reviewed. ------------------------------ ED COURSE/MEDICAL DECISION MAKING----------------------  Medications   sodium chloride flush 0.9 % injection 10 mL (10 mLs Intravenous Given 10/5/20 0132)   0.9 % sodium chloride bolus (1,000 mLs Intravenous New Bag 10/5/20 0037)   metoclopramide (REGLAN) injection 10 mg (10 mg Intravenous Given 10/5/20 0038)   diphenhydrAMINE (BENADRYL) injection 12.5 mg (12.5 mg Intravenous Given 10/5/20 0038)   iopamidol (ISOVUE-370) 76 % injection 80 mL (80 mLs Intravenous Given 10/5/20 0137)         ED COURSE:       Medical Decision Making: This is a 72-year-old female presented to the ED for headache and dizziness. Upon arrival to the ED the patient was hypertensive but otherwise stable vital signs. Patient did have a palpable tenderness over the left frontal sinus. Patient was neurologically intact with a benign exam otherwise. Patient given fluids Benadryl and Reglan for headache. She underwent laboratory work-up for dizziness which showed a normal CBC. Normal chemistry except for BUN/creatinine 29/1.5 which is the patient's baseline. Troponin negative. Chest x-ray unremarkable. EKG unchanged from previous. Due to the patient having 3 days of a headache the patient underwent CTA which was unremarkable for any acute findings. On reevaluation the patient's headache is resolved. Blood pressure improved. Patient feels much better.   She will be treated symptomatically at this time. Follow-up with PCP in 2 days. Return precautions given. Patient agrees with plan. I, Dr. Jovan Uribe, am the primary provider for this encounter    This patient's ED course included: a personal history and physicial examination, re-evaluation prior to disposition and multiple bedside re-evaluations    This patient has remained hemodynamically stable during their ED course. Re-Evaluations:             Re-evaluation. Patients symptoms are improving      Counseling: The emergency provider has spoken with the patient and discussed todays results, in addition to providing specific details for the plan of care and counseling regarding the diagnosis and prognosis. Questions are answered at this time and they are agreeable with the plan.       --------------------------------- IMPRESSION AND DISPOSITION ---------------------------------    IMPRESSION  1. Acute nonintractable headache, unspecified headache type    2. Dizziness    3. Acute non-recurrent frontal sinusitis        DISPOSITION  Disposition: Discharge to home  Patient condition is stable    NOTE: This report was transcribed using voice recognition software.  Every effort was made to ensure accuracy; however, inadvertent computerized transcription errors may be present        Mireya Kapadia DO  10/05/20 0208

## 2020-10-23 ENCOUNTER — TELEPHONE (OUTPATIENT)
Dept: FAMILY MEDICINE CLINIC | Age: 70
End: 2020-10-23

## 2021-12-11 ENCOUNTER — HOSPITAL ENCOUNTER (EMERGENCY)
Age: 71
Discharge: HOME | End: 2021-12-11
Payer: MEDICARE

## 2021-12-11 VITALS
HEIGHT: 64 IN | RESPIRATION RATE: 16 BRPM | WEIGHT: 220 LBS | TEMPERATURE: 97.4 F | HEART RATE: 70 BPM | OXYGEN SATURATION: 92 % | SYSTOLIC BLOOD PRESSURE: 178 MMHG | DIASTOLIC BLOOD PRESSURE: 98 MMHG | BODY MASS INDEX: 37.56 KG/M2

## 2021-12-11 DIAGNOSIS — K12.1 STOMATITIS: Primary | ICD-10-CM

## 2021-12-11 DIAGNOSIS — I10 ESSENTIAL HYPERTENSION: ICD-10-CM

## 2021-12-11 PROCEDURE — 99282 EMERGENCY DEPT VISIT SF MDM: CPT

## 2021-12-11 RX ORDER — CLOTRIMAZOLE 1 %
CREAM (GRAM) TOPICAL 2 TIMES DAILY
Status: DISCONTINUED | OUTPATIENT
Start: 2021-12-11 | End: 2021-12-11

## 2021-12-11 RX ORDER — KETOCONAZOLE 20 MG/G
CREAM TOPICAL
Qty: 30 G | Refills: 1 | Status: SHIPPED | OUTPATIENT
Start: 2021-12-11 | End: 2022-07-27

## 2021-12-11 ASSESSMENT — PAIN DESCRIPTION - LOCATION: LOCATION: MOUTH

## 2021-12-11 ASSESSMENT — PAIN DESCRIPTION - PAIN TYPE: TYPE: ACUTE PAIN

## 2021-12-11 NOTE — ED PROVIDER NOTES
[facility]  Department of Emergency Medicine   ED  Encounter Note  Admit Date/RoomTime: 2021  6:30 PM  ED Room: RUSS/RUSS    NAME: Ramesh Varghese  : 1950  MRN: [id number]     Chief Complaint:  Rash (pt having rash on both sides of mouth since MID SUMMER. states that it is starting to hurt. hasn't seen another physician yet for this )    History of Present Illness       Ramesh Varghese is a 70 y.o. old female who presents to the emergency department by private vehicle, for persistent of red, painful and dry area on the bilateral corners of her mouth which began 6 month(s) prior to arrival.  The symptoms were caused by unknown cause. Since onset the symptoms have been persistent. Prior history of similar episodes: No.  Her symptoms are associated with nothing additional and relieved by nothing. She denies any headache, neck pain, fever, chills, chest pain, shortness breath, cough, abdominal pain, nausea, vomiting, diarrhea, constipation, or dysuria. She states that she has not been evaluated by her physician for this complaint, but has tried bacitracin and vitamin D without improvement. ROS   Pertinent positives and negatives are stated within HPI, all other systems reviewed and are negative. Past Medical History:  has a past medical history of A-fib ([address] ARF (acute renal failure) ([address] Cancer ([address] Cardiac dysfunction, Diabetes mellitus ([address] Diverticulitis, History of deep vein thrombosis, Hx of blood clots, Poor venous access, S/P AVR, S/P IVC filter, Traumatic seroma of left thigh ([address] and Ventilator dependent ([address] Surgical History:  has a past surgical history that includes Cardiac surgery; Gastrostomy tube placement (2016); thoracentesis (10/14/2016); Colonoscopy; Endoscopy, colon, diagnostic; Aortic valve replacement; and Vena Cava Filter Placement (Right, 10/14/2016).     Social History:  reports that she has never smoked. She has never used smokeless tobacco. She reports that she does not drink alcohol and does not use drugs. Family History: family history is not on file. Allergies: Ciprofloxacin, Protamine, Hydrocodone-acetaminophen, Oxycodone-acetaminophen, and Vancomycin    Physical Exam   Oxygen Saturation Interpretation: Normal.        ED Triage Vitals [12/11/21 1615]   BP Temp Temp src Pulse Resp SpO2 Height Weight   (!) 208/98 97.4 °F (36.3 °C) -- 74 16 92 % 5' 4\" (1.626 m) 220 lb (99.8 kg)         Constitutional:  Alert, development consistent with age. HEENT:  NC/NT. Airway patent. Eyes:  PERRL, EOMI, no discharge. Ears:  TMs without perforation, injection, or bulging. External canals clear without exudate. Mouth:  Mucous membranes moist without lesions, tongue and gums normal.  Throat:  Pharynx without injection, exudate, or tonsillar hypertrophy. Airway patient. Neck:  Supple. No lymphadenopathy. Respiratory:  Clear to auscultation and breath sounds equal.  CV:  Regular rate and rhythm. GI:  Abdomen Soft, nontender, +BS. Integument:  Skin turgor: Normal.              no wounds, erythema, or swelling. Neurological:  Orientation age-appropriate unless noted elseware. Motor functions intact. Lab / Imaging Results   (All laboratory and radiology results have been personally reviewed by myself)  Labs:  No results found for this visit on 12/11/21. Imaging: All Radiology results interpreted by Radiologist unless otherwise noted. No orders to display       ED Course / Medical Decision Making   Medications - No data to display     Consults:   None    Procedures:   none    MDM:   Patient presented with ongoing rash to the external corners of her mouth that has been there for approximately 6 months per her. This has not gotten better with bacitracin and vitamin E treatments at home. She has the clinical presentation of a stomatitis. She otherwise appears well and nontoxic.   Her blood pressure was elevated at 178/98 manually, but she is asymptomatic of her hypertension. She is appropriate for discharge and outpatient follow-up. Prescribing her ketoconazole cream and also instructed her to use a barrier cream of over-the-counter a and D ointment. She is instructed to return the emergency department immediately with any new or worsening symptoms. Plan of Care/Counseling:  ROOSEVELT Morgan CNP reviewed today's visit with the patient in addition to providing specific details for the plan of care and counseling regarding the diagnosis and prognosis. Questions are answered at this time and are agreeable with the plan. Assessment      1. Stomatitis    2. Essential hypertension      Plan   Discharged home. Patient condition is good    New Medications     New Prescriptions    KETOCONAZOLE (NIZORAL) 2 % CREAM    Apply topically daily. Electronically signed by ROOSEVELT Morgan CNP   DD: 12/11/21  **This report was transcribed using voice recognition software. Every effort was made to ensure accuracy; however, inadvertent computerized transcription errors may be present.   END OF ED PROVIDER NOTE       ROOSEVELT Chapman CNP  12/11/21 [id number]

## 2022-01-19 ENCOUNTER — OFFICE VISIT (OUTPATIENT)
Dept: PRIMARY CARE CLINIC | Age: 72
End: 2022-01-19
Payer: MEDICARE

## 2022-01-19 VITALS
SYSTOLIC BLOOD PRESSURE: 189 MMHG | OXYGEN SATURATION: 99 % | HEART RATE: 83 BPM | BODY MASS INDEX: 37.56 KG/M2 | DIASTOLIC BLOOD PRESSURE: 90 MMHG | HEIGHT: 64 IN | RESPIRATION RATE: 18 BRPM | WEIGHT: 220 LBS | TEMPERATURE: 97.2 F

## 2022-01-19 DIAGNOSIS — B96.89 ACUTE BACTERIAL SINUSITIS: Primary | ICD-10-CM

## 2022-01-19 DIAGNOSIS — K12.1 STOMATITIS: ICD-10-CM

## 2022-01-19 DIAGNOSIS — J01.90 ACUTE BACTERIAL SINUSITIS: Primary | ICD-10-CM

## 2022-01-19 PROCEDURE — 3017F COLORECTAL CA SCREEN DOC REV: CPT | Performed by: NURSE PRACTITIONER

## 2022-01-19 PROCEDURE — G8427 DOCREV CUR MEDS BY ELIG CLIN: HCPCS | Performed by: NURSE PRACTITIONER

## 2022-01-19 PROCEDURE — 1090F PRES/ABSN URINE INCON ASSESS: CPT | Performed by: NURSE PRACTITIONER

## 2022-01-19 PROCEDURE — G8417 CALC BMI ABV UP PARAM F/U: HCPCS | Performed by: NURSE PRACTITIONER

## 2022-01-19 PROCEDURE — 4040F PNEUMOC VAC/ADMIN/RCVD: CPT | Performed by: NURSE PRACTITIONER

## 2022-01-19 PROCEDURE — 1123F ACP DISCUSS/DSCN MKR DOCD: CPT | Performed by: NURSE PRACTITIONER

## 2022-01-19 PROCEDURE — G8400 PT W/DXA NO RESULTS DOC: HCPCS | Performed by: NURSE PRACTITIONER

## 2022-01-19 PROCEDURE — 1036F TOBACCO NON-USER: CPT | Performed by: NURSE PRACTITIONER

## 2022-01-19 PROCEDURE — G8484 FLU IMMUNIZE NO ADMIN: HCPCS | Performed by: NURSE PRACTITIONER

## 2022-01-19 PROCEDURE — 99213 OFFICE O/P EST LOW 20 MIN: CPT | Performed by: NURSE PRACTITIONER

## 2022-01-19 RX ORDER — CEFUROXIME AXETIL 500 MG/1
500 TABLET ORAL 2 TIMES DAILY
Qty: 14 TABLET | Refills: 0 | Status: SHIPPED | OUTPATIENT
Start: 2022-01-19 | End: 2022-01-26

## 2022-01-19 RX ORDER — METHYLPREDNISOLONE 4 MG/1
TABLET ORAL
Qty: 1 KIT | Refills: 0 | Status: SHIPPED | OUTPATIENT
Start: 2022-01-19 | End: 2022-01-25

## 2022-01-19 NOTE — PROGRESS NOTES
Chief Complaint:   Sinus Problem and Rash (on mouth )      History of Present Illness   Source of history provided by:  patient. Tedi Schirmer is a 70 y.o. old female with a past medical history of:   Past Medical History:   Diagnosis Date    A-fib [facility])     [facility]) [facility] [address]     Cancer ([facility] [address]     lymphoma    Cardiac dysfunction     Diabetes mellitus ([facility] [address]     Diverticulitis     History of deep vein thrombosis 11/3/2016    Hx of blood clots     Poor venous access 11/4/2016    S/P AVR     S/P IVC filter 11/3/2016    Traumatic seroma of left thigh (HCC) 11/16/2016    Ventilator dependent ([facility] [address]         Pt presents to the ready care with congestion/sinus pressure/PND and rash to corners of mouth  for the past several days. No fever noted. Denies any N/V/D, abdominal pain, CP, progressive SOB, dizziness, or lethargy. Pt is requesting Ceftin to be prescribed. Pt has a h/o stomatitis       ROS    Unless otherwise stated in this report or unable to obtain because of the patient's clinical or mental status as evidenced by the medical record, this patients's positive and negative responses for Review of Systems, constitutional, psych, eyes, ENT, cardiovascular, respiratory, gastrointestinal, neurological, genitourinary, musculoskeletal, integument systems and systems related to the presenting problem are either stated in the preceding or were not pertinent or were negative for the symptoms and/or complaints related to the medical problem. Past Surgical History:  has a past surgical history that includes Cardiac surgery; Gastrostomy tube placement (09/26/2016); thoracentesis (10/14/2016); Colonoscopy; Endoscopy, colon, diagnostic; Aortic valve replacement; and Vena Cava Filter Placement (Right, 10/14/2016). Social History:  reports that she has never smoked. She has never used smokeless tobacco. She reports that she does not drink alcohol and does not use drugs.   Family History: family history is not on file. Allergies: Ciprofloxacin, Protamine, Hydrocodone-acetaminophen, Oxycodone-acetaminophen, and Vancomycin    Physical Exam         VS:  BP (!) 189/90   Pulse 83   Temp 97.2 °F (36.2 °C) (Temporal)   Resp 18   Ht 5' 4\" (1.626 m)   Wt 220 lb (99.8 kg)   SpO2 99%   BMI 37.76 kg/m²    Oxygen Saturation Interpretation: Normal.    Constitutional:  Alert, development consistent with age. Ears:  External Ears: Normal bilateral pinna. TM's & External Canals: TM's normal bilaterally without perforation. Canals without erythema or drainage. Nose:   There is no obvious septal defect. Moderate redness/edema, sinus tenderness present  Mouth:  Moist bucca mucosa and normal tongue. Throat: Mild posterior pharyngeal erythema without exudates or lesions. Neck:  Supple. There is no obvious adenopathy or neck tenderness. Lungs:   Breath sounds: Normal chest expansion and breath sounds noted throughout. No wheezes, rales, or rhonchi noted. Heart:  Regular rate and rhythm, normal heart sounds, without pathological murmurs, ectopy, gallops, or rubs. Skin:  Normal turgor. Mild/modertae redness or corners of mouth, skin intact, no lesions or vesicles present  Neurological:  Oriented. Motor functions intact. Lab / Imaging Results   (All laboratory and radiology results have been personally reviewed by myself)  Labs:  No results found for this visit on 01/19/22. Imaging: All Radiology results interpreted by Radiologist unless otherwise noted. No orders to display         Assessment / Plan     Impression(s):  1. Acute bacterial sinusitis    2. Stomatitis      Disposition:  Disposition: Ceftin and MDP as ordered      ER if changes or worse. Advised to take all medications as directed.

## 2022-07-27 ENCOUNTER — HOSPITAL ENCOUNTER (INPATIENT)
Age: 72
LOS: 2 days | Discharge: HOME | DRG: 864 | End: 2022-07-29
Attending: STUDENT IN AN ORGANIZED HEALTH CARE EDUCATION/TRAINING PROGRAM | Admitting: FAMILY MEDICINE
Payer: MEDICARE

## 2022-07-27 ENCOUNTER — APPOINTMENT (OUTPATIENT)
Dept: CT IMAGING | Age: 72
DRG: 864 | End: 2022-07-27
Payer: MEDICARE

## 2022-07-27 ENCOUNTER — APPOINTMENT (OUTPATIENT)
Dept: GENERAL RADIOLOGY | Age: 72
DRG: 864 | End: 2022-07-27
Payer: MEDICARE

## 2022-07-27 DIAGNOSIS — R50.9 FEVER OF UNKNOWN ORIGIN: Primary | ICD-10-CM

## 2022-07-27 PROBLEM — D72.829 LEUKOCYTOSIS: Status: ACTIVE | Noted: 2022-07-27

## 2022-07-27 PROBLEM — N18.32 STAGE 3B CHRONIC KIDNEY DISEASE (HCC): Status: ACTIVE | Noted: 2022-07-27

## 2022-07-27 LAB
ALBUMIN SERPL-MCNC: 4.2 G/DL (ref 3.5–5.2)
ALP BLD-CCNC: 72 U/L (ref 35–104)
ALT SERPL-CCNC: 17 U/L (ref 0–32)
ANION GAP SERPL CALCULATED.3IONS-SCNC: 13 MMOL/L (ref 7–16)
AST SERPL-CCNC: 16 U/L (ref 0–31)
BACTERIA: ABNORMAL /HPF
BASOPHILS ABSOLUTE: 0.04 E9/L (ref 0–0.2)
BASOPHILS RELATIVE PERCENT: 0.3 % (ref 0–2)
BILIRUB SERPL-MCNC: 0.4 MG/DL (ref 0–1.2)
BILIRUBIN URINE: NEGATIVE
BLOOD, URINE: ABNORMAL
BUN BLDV-MCNC: 33 MG/DL (ref 6–23)
CALCIUM SERPL-MCNC: 9.6 MG/DL (ref 8.6–10.2)
CHLORIDE BLD-SCNC: 102 MMOL/L (ref 98–107)
CHOLESTEROL, TOTAL: 152 MG/DL (ref 0–199)
CLARITY: CLEAR
CO2: 26 MMOL/L (ref 22–29)
COLOR: YELLOW
CREAT SERPL-MCNC: 1.4 MG/DL (ref 0.5–1)
EKG ATRIAL RATE: 96 BPM
EKG P AXIS: -51 DEGREES
EKG P-R INTERVAL: 116 MS
EKG Q-T INTERVAL: 414 MS
EKG QRS DURATION: 178 MS
EKG QTC CALCULATION (BAZETT): 506 MS
EKG R AXIS: -90 DEGREES
EKG T AXIS: 50 DEGREES
EKG VENTRICULAR RATE: 90 BPM
EOSINOPHILS ABSOLUTE: 0.14 E9/L (ref 0.05–0.5)
EOSINOPHILS RELATIVE PERCENT: 1 % (ref 0–6)
EPITHELIAL CELLS, UA: ABNORMAL /HPF
GFR AFRICAN AMERICAN: 45
GFR NON-AFRICAN AMERICAN: 37 ML/MIN/1.73
GLUCOSE BLD-MCNC: 127 MG/DL (ref 74–99)
GLUCOSE URINE: NEGATIVE MG/DL
HCT VFR BLD CALC: 45.1 % (ref 34–48)
HDLC SERPL-MCNC: 44 MG/DL
HEMOGLOBIN: 14.9 G/DL (ref 11.5–15.5)
IMMATURE GRANULOCYTES #: 0.04 E9/L
IMMATURE GRANULOCYTES %: 0.3 % (ref 0–5)
INFLUENZA A BY PCR: NOT DETECTED
INFLUENZA B BY PCR: NOT DETECTED
KETONES, URINE: NEGATIVE MG/DL
LACTIC ACID, SEPSIS: 0.9 MMOL/L (ref 0.5–1.9)
LDL CHOLESTEROL CALCULATED: 76 MG/DL (ref 0–99)
LEUKOCYTE ESTERASE, URINE: NEGATIVE
LYMPHOCYTES ABSOLUTE: 0.96 E9/L (ref 1.5–4)
LYMPHOCYTES RELATIVE PERCENT: 7.2 % (ref 20–42)
MCH RBC QN AUTO: 30.7 PG (ref 26–35)
MCHC RBC AUTO-ENTMCNC: 33 % (ref 32–34.5)
MCV RBC AUTO: 93 FL (ref 80–99.9)
MONOCYTES ABSOLUTE: 0.79 E9/L (ref 0.1–0.95)
MONOCYTES RELATIVE PERCENT: 5.9 % (ref 2–12)
NEUTROPHILS ABSOLUTE: 11.4 E9/L (ref 1.8–7.3)
NEUTROPHILS RELATIVE PERCENT: 85.3 % (ref 43–80)
NITRITE, URINE: NEGATIVE
PDW BLD-RTO: 13.9 FL (ref 11.5–15)
PH UA: 6 (ref 5–9)
PLATELET # BLD: 192 E9/L (ref 130–450)
PMV BLD AUTO: 10.2 FL (ref 7–12)
POTASSIUM REFLEX MAGNESIUM: 4.4 MMOL/L (ref 3.5–5)
PRO-BNP: 427 PG/ML (ref 0–125)
PROTEIN UA: >=300 MG/DL
RBC # BLD: 4.85 E12/L (ref 3.5–5.5)
RBC UA: ABNORMAL /HPF (ref 0–2)
SARS-COV-2, NAAT: NOT DETECTED
SODIUM BLD-SCNC: 141 MMOL/L (ref 132–146)
SPECIFIC GRAVITY UA: 1.02 (ref 1–1.03)
TOTAL PROTEIN: 7.6 G/DL (ref 6.4–8.3)
TRIGL SERPL-MCNC: 160 MG/DL (ref 0–149)
TROPONIN, HIGH SENSITIVITY: 24 NG/L (ref 0–9)
TROPONIN, HIGH SENSITIVITY: 25 NG/L (ref 0–9)
UROBILINOGEN, URINE: 0.2 E.U./DL
VLDLC SERPL CALC-MCNC: 32 MG/DL
WBC # BLD: 13.4 E9/L (ref 4.5–11.5)
WBC UA: ABNORMAL /HPF (ref 0–5)

## 2022-07-27 PROCEDURE — 85025 COMPLETE CBC W/AUTO DIFF WBC: CPT

## 2022-07-27 PROCEDURE — 87635 SARS-COV-2 COVID-19 AMP PRB: CPT

## 2022-07-27 PROCEDURE — 71250 CT THORAX DX C-: CPT

## 2022-07-27 PROCEDURE — 82728 ASSAY OF FERRITIN: CPT

## 2022-07-27 PROCEDURE — 84484 ASSAY OF TROPONIN QUANT: CPT

## 2022-07-27 PROCEDURE — 36415 COLL VENOUS BLD VENIPUNCTURE: CPT

## 2022-07-27 PROCEDURE — 6360000002 HC RX W HCPCS: Performed by: STUDENT IN AN ORGANIZED HEALTH CARE EDUCATION/TRAINING PROGRAM

## 2022-07-27 PROCEDURE — 96375 TX/PRO/DX INJ NEW DRUG ADDON: CPT

## 2022-07-27 PROCEDURE — 87502 INFLUENZA DNA AMP PROBE: CPT

## 2022-07-27 PROCEDURE — 99285 EMERGENCY DEPT VISIT HI MDM: CPT

## 2022-07-27 PROCEDURE — 2580000003 HC RX 258: Performed by: FAMILY MEDICINE

## 2022-07-27 PROCEDURE — 85651 RBC SED RATE NONAUTOMATED: CPT

## 2022-07-27 PROCEDURE — 2500000003 HC RX 250 WO HCPCS: Performed by: FAMILY MEDICINE

## 2022-07-27 PROCEDURE — 2580000003 HC RX 258: Performed by: STUDENT IN AN ORGANIZED HEALTH CARE EDUCATION/TRAINING PROGRAM

## 2022-07-27 PROCEDURE — 81001 URINALYSIS AUTO W/SCOPE: CPT

## 2022-07-27 PROCEDURE — 96374 THER/PROPH/DIAG INJ IV PUSH: CPT

## 2022-07-27 PROCEDURE — 6370000000 HC RX 637 (ALT 250 FOR IP): Performed by: FAMILY MEDICINE

## 2022-07-27 PROCEDURE — 2060000000 HC ICU INTERMEDIATE R&B

## 2022-07-27 PROCEDURE — 71045 X-RAY EXAM CHEST 1 VIEW: CPT

## 2022-07-27 PROCEDURE — 84145 PROCALCITONIN (PCT): CPT

## 2022-07-27 PROCEDURE — 83605 ASSAY OF LACTIC ACID: CPT

## 2022-07-27 PROCEDURE — 6360000002 HC RX W HCPCS: Performed by: FAMILY MEDICINE

## 2022-07-27 PROCEDURE — 74176 CT ABD & PELVIS W/O CONTRAST: CPT

## 2022-07-27 PROCEDURE — 80053 COMPREHEN METABOLIC PANEL: CPT

## 2022-07-27 PROCEDURE — 80061 LIPID PANEL: CPT

## 2022-07-27 PROCEDURE — 87040 BLOOD CULTURE FOR BACTERIA: CPT

## 2022-07-27 PROCEDURE — 93005 ELECTROCARDIOGRAM TRACING: CPT | Performed by: STUDENT IN AN ORGANIZED HEALTH CARE EDUCATION/TRAINING PROGRAM

## 2022-07-27 PROCEDURE — 83880 ASSAY OF NATRIURETIC PEPTIDE: CPT

## 2022-07-27 RX ORDER — NIFEDIPINE 60 MG/1
60 TABLET, EXTENDED RELEASE ORAL NIGHTLY
COMMUNITY
End: 2022-07-27 | Stop reason: ALTCHOICE

## 2022-07-27 RX ORDER — NIFEDIPINE 30 MG/1
60 TABLET, FILM COATED, EXTENDED RELEASE ORAL NIGHTLY
Status: DISCONTINUED | OUTPATIENT
Start: 2022-07-27 | End: 2022-07-28

## 2022-07-27 RX ORDER — ALBUTEROL SULFATE 90 UG/1
2 AEROSOL, METERED RESPIRATORY (INHALATION) EVERY 4 HOURS PRN
COMMUNITY

## 2022-07-27 RX ORDER — KETOROLAC TROMETHAMINE 30 MG/ML
15 INJECTION, SOLUTION INTRAMUSCULAR; INTRAVENOUS ONCE
Status: COMPLETED | OUTPATIENT
Start: 2022-07-27 | End: 2022-07-27

## 2022-07-27 RX ORDER — SODIUM CHLORIDE 0.9 % (FLUSH) 0.9 %
5-40 SYRINGE (ML) INJECTION EVERY 12 HOURS SCHEDULED
Status: DISCONTINUED | OUTPATIENT
Start: 2022-07-27 | End: 2022-07-29 | Stop reason: HOSPADM

## 2022-07-27 RX ORDER — ONDANSETRON 4 MG/1
4 TABLET, ORALLY DISINTEGRATING ORAL EVERY 8 HOURS PRN
Status: DISCONTINUED | OUTPATIENT
Start: 2022-07-27 | End: 2022-07-27

## 2022-07-27 RX ORDER — 0.9 % SODIUM CHLORIDE 0.9 %
1000 INTRAVENOUS SOLUTION INTRAVENOUS ONCE
Status: COMPLETED | OUTPATIENT
Start: 2022-07-27 | End: 2022-07-27

## 2022-07-27 RX ORDER — M-VIT,TX,IRON,MINS/CALC/FOLIC 27MG-0.4MG
1 TABLET ORAL DAILY
COMMUNITY

## 2022-07-27 RX ORDER — FUROSEMIDE 40 MG/1
40 TABLET ORAL NIGHTLY
Status: CANCELLED | OUTPATIENT
Start: 2022-07-27

## 2022-07-27 RX ORDER — ACETAMINOPHEN 325 MG/1
650 TABLET ORAL EVERY 6 HOURS PRN
Status: DISCONTINUED | OUTPATIENT
Start: 2022-07-27 | End: 2022-07-29 | Stop reason: HOSPADM

## 2022-07-27 RX ORDER — ENOXAPARIN SODIUM 100 MG/ML
40 INJECTION SUBCUTANEOUS DAILY
Status: CANCELLED | OUTPATIENT
Start: 2022-07-27

## 2022-07-27 RX ORDER — SODIUM CHLORIDE 0.9 % (FLUSH) 0.9 %
5-40 SYRINGE (ML) INJECTION PRN
Status: DISCONTINUED | OUTPATIENT
Start: 2022-07-27 | End: 2022-07-29 | Stop reason: HOSPADM

## 2022-07-27 RX ORDER — SODIUM CHLORIDE 9 MG/ML
INJECTION, SOLUTION INTRAVENOUS PRN
Status: DISCONTINUED | OUTPATIENT
Start: 2022-07-27 | End: 2022-07-29 | Stop reason: HOSPADM

## 2022-07-27 RX ORDER — FUROSEMIDE 40 MG/1
40 TABLET ORAL NIGHTLY
COMMUNITY
End: 2022-09-06 | Stop reason: SDUPTHER

## 2022-07-27 RX ORDER — ONDANSETRON 2 MG/ML
4 INJECTION INTRAMUSCULAR; INTRAVENOUS ONCE
Status: COMPLETED | OUTPATIENT
Start: 2022-07-27 | End: 2022-07-27

## 2022-07-27 RX ORDER — LABETALOL HYDROCHLORIDE 5 MG/ML
5 INJECTION, SOLUTION INTRAVENOUS EVERY 6 HOURS
Status: DISCONTINUED | OUTPATIENT
Start: 2022-07-27 | End: 2022-07-27

## 2022-07-27 RX ORDER — ACETAMINOPHEN 650 MG/1
650 SUPPOSITORY RECTAL EVERY 6 HOURS PRN
Status: DISCONTINUED | OUTPATIENT
Start: 2022-07-27 | End: 2022-07-29 | Stop reason: HOSPADM

## 2022-07-27 RX ORDER — POLYETHYLENE GLYCOL 3350 17 G/17G
17 POWDER, FOR SOLUTION ORAL DAILY PRN
Status: DISCONTINUED | OUTPATIENT
Start: 2022-07-27 | End: 2022-07-29 | Stop reason: HOSPADM

## 2022-07-27 RX ORDER — SODIUM CHLORIDE 9 MG/ML
INJECTION, SOLUTION INTRAVENOUS CONTINUOUS
Status: DISCONTINUED | OUTPATIENT
Start: 2022-07-27 | End: 2022-07-28

## 2022-07-27 RX ORDER — ATORVASTATIN CALCIUM 40 MG/1
40 TABLET, FILM COATED ORAL NIGHTLY
Status: DISCONTINUED | OUTPATIENT
Start: 2022-07-27 | End: 2022-07-29 | Stop reason: HOSPADM

## 2022-07-27 RX ORDER — LABETALOL HYDROCHLORIDE 5 MG/ML
5 INJECTION, SOLUTION INTRAVENOUS EVERY 6 HOURS PRN
Status: DISCONTINUED | OUTPATIENT
Start: 2022-07-27 | End: 2022-07-29 | Stop reason: HOSPADM

## 2022-07-27 RX ORDER — ATORVASTATIN CALCIUM 40 MG/1
40 TABLET, FILM COATED ORAL NIGHTLY
COMMUNITY

## 2022-07-27 RX ORDER — ONDANSETRON 2 MG/ML
4 INJECTION INTRAMUSCULAR; INTRAVENOUS EVERY 6 HOURS PRN
Status: DISCONTINUED | OUTPATIENT
Start: 2022-07-27 | End: 2022-07-27

## 2022-07-27 RX ADMIN — LABETALOL HYDROCHLORIDE 5 MG: 5 INJECTION INTRAVENOUS at 23:01

## 2022-07-27 RX ADMIN — KETOROLAC TROMETHAMINE 15 MG: 30 INJECTION, SOLUTION INTRAMUSCULAR at 13:32

## 2022-07-27 RX ADMIN — APIXABAN 5 MG: 5 TABLET, FILM COATED ORAL at 20:38

## 2022-07-27 RX ADMIN — SODIUM CHLORIDE: 9 INJECTION, SOLUTION INTRAVENOUS at 20:41

## 2022-07-27 RX ADMIN — SODIUM CHLORIDE 1000 ML: 9 INJECTION, SOLUTION INTRAVENOUS at 13:31

## 2022-07-27 RX ADMIN — VANCOMYCIN HYDROCHLORIDE 2000 MG: 10 INJECTION, POWDER, LYOPHILIZED, FOR SOLUTION INTRAVENOUS at 21:34

## 2022-07-27 RX ADMIN — NIFEDIPINE 60 MG: 30 TABLET, EXTENDED RELEASE ORAL at 21:34

## 2022-07-27 RX ADMIN — TRIMETHOBENZAMIDE HYDROCHLORIDE 200 MG: 100 INJECTION INTRAMUSCULAR at 22:49

## 2022-07-27 RX ADMIN — PIPERACILLIN AND TAZOBACTAM 4500 MG: 4; .5 INJECTION, POWDER, FOR SOLUTION INTRAVENOUS at 18:12

## 2022-07-27 RX ADMIN — ONDANSETRON 4 MG: 2 INJECTION INTRAMUSCULAR; INTRAVENOUS at 13:32

## 2022-07-27 RX ADMIN — ATORVASTATIN CALCIUM 40 MG: 40 TABLET, FILM COATED ORAL at 20:38

## 2022-07-27 RX ADMIN — Medication 10 ML: at 21:34

## 2022-07-27 RX ADMIN — ACETAMINOPHEN 650 MG: 325 TABLET ORAL at 20:38

## 2022-07-27 ASSESSMENT — ENCOUNTER SYMPTOMS
SHORTNESS OF BREATH: 1
BACK PAIN: 0
SORE THROAT: 0
EYE PAIN: 0
COUGH: 1
SINUS PRESSURE: 0
COUGH: 0
WHEEZING: 0
EYE REDNESS: 0
ABDOMINAL DISTENTION: 0
ABDOMINAL PAIN: 0
TROUBLE SWALLOWING: 0
NAUSEA: 1
CONSTIPATION: 0
EYE DISCHARGE: 0
VOMITING: 0
DIARRHEA: 0

## 2022-07-27 ASSESSMENT — PAIN - FUNCTIONAL ASSESSMENT: PAIN_FUNCTIONAL_ASSESSMENT: NONE - DENIES PAIN

## 2022-07-27 ASSESSMENT — PAIN DESCRIPTION - DESCRIPTORS: DESCRIPTORS: ACHING

## 2022-07-27 ASSESSMENT — PAIN SCALES - GENERAL
PAINLEVEL_OUTOF10: 3
PAINLEVEL_OUTOF10: 4
PAINLEVEL_OUTOF10: 0

## 2022-07-27 ASSESSMENT — PAIN DESCRIPTION - LOCATION: LOCATION: NECK

## 2022-07-27 NOTE — PROGRESS NOTES
Database initiated pharmacy and medications verified with the patient. She is A&O from home with . She uses no assistive devices and wears 1 liter oxygen as needed.

## 2022-07-27 NOTE — ED PROVIDER NOTES
Department of Emergency Medicine   ED  Provider Note  Admit Date/RoomTime: 7/27/2022 12:24 PM  ED Room: [address] [place] [address]A          History of Present Illness:  7/27/22, Time: 12:47 PM EDT  No chief complaint on file. Danny Camarena is a 67 y.o. female presenting to the ED for nausea, cough, shortness of breath, and myalgias, beginning past couple of days. The complaint has been constant, moderate in severity, and worsened by changing position. Patient states she has not vomited but has had dry heaves. Patient does have oxygen as needed at home but states she has been using it more often secondary to her shortness of breath. She states she is not vaccinated against covid. Review of Systems   Constitutional:  Positive for fever. Negative for chills. HENT:  Negative for ear pain, sinus pressure and sore throat. Eyes:  Negative for pain, discharge and redness. Respiratory:  Positive for cough and shortness of breath. Negative for wheezing. Cardiovascular:  Negative for chest pain. Gastrointestinal:  Positive for nausea. Negative for abdominal distention, diarrhea and vomiting. Genitourinary:  Negative for dysuria and frequency. Musculoskeletal:  Negative for arthralgias and back pain. Skin:  Negative for rash and wound. Neurological:  Negative for weakness and headaches. Hematological:  Negative for adenopathy. All other systems reviewed and are negative.       --------------------------------------------- PAST HISTORY ---------------------------------------------  Past Medical History:  has a past medical history of A-fib ([address] ARF (acute renal failure) ([facility] [address] Cancer ([facility] [address] Cardiac dysfunction, Diabetes mellitus ([facility] [address] Diverticulitis, History of deep vein thrombosis, Hx of blood clots, Poor venous access, S/P AVR, S/P IVC filter, Traumatic seroma of left thigh ([facility] [address] and Ventilator dependent ([facility] [address]     Past Surgical History:  has a past surgical history that includes Cardiac surgery; Gastrostomy tube placement (09/26/2016); thoracentesis (10/14/2016); Colonoscopy; Endoscopy, colon, diagnostic; Aortic valve replacement; and Vena Cava Filter Placement (Right, 10/14/2016). Social History:  reports that she has never smoked. She has never used smokeless tobacco. She reports that she does not drink alcohol and does not use drugs. Family History: family history is not on file. . Unless otherwise noted, family history is non contributory    The patients home medications have been reviewed. Allergies: Protamine, Ciprofloxacin, Hydrocodone-acetaminophen, Oxycodone-acetaminophen, and Vancomycin        ---------------------------------------------------PHYSICAL EXAM--------------------------------------    Physical Exam  Vitals and nursing note reviewed. Constitutional:       General: She is not in acute distress. Appearance: She is well-developed. She is obese. HENT:      Head: Normocephalic and atraumatic. Eyes:      Conjunctiva/sclera: Conjunctivae normal.   Cardiovascular:      Rate and Rhythm: Normal rate and regular rhythm. Heart sounds: Normal heart sounds. No murmur heard. Pulmonary:      Effort: Pulmonary effort is normal. No respiratory distress. Breath sounds: Normal breath sounds. No wheezing or rales. Comments: On 1L NC  Abdominal:      General: Bowel sounds are normal.      Palpations: Abdomen is soft. Tenderness: There is no abdominal tenderness. There is no guarding or rebound. Musculoskeletal:      Cervical back: Normal range of motion and neck supple. No rigidity or tenderness. Skin:     General: Skin is warm and dry. Neurological:      Mental Status: She is alert and oriented to person, place, and time. Cranial Nerves: No cranial nerve deficit.       Coordination: Coordination normal.          -------------------------------------------------- RESULTS -------------------------------------------------  I have personally reviewed all laboratory and imaging results for this patient. Results are listed below.      LABS: (Lab results interpreted by me)  Results for orders placed or performed during the hospital encounter of 07/27/22   COVID-19, Rapid    Specimen: Nasopharyngeal Swab   Result Value Ref Range    SARS-CoV-2, NAAT Not Detected Not Detected   RAPID INFLUENZA A/B ANTIGENS    Specimen: Nasopharyngeal   Result Value Ref Range    Influenza A by PCR Not Detected Not Detected    Influenza B by PCR Not Detected Not Detected   CBC with Auto Differential   Result Value Ref Range    WBC 13.4 (H) 4.5 - 11.5 E9/L    RBC 4.85 3.50 - 5.50 E12/L    Hemoglobin 14.9 11.5 - 15.5 g/dL    Hematocrit 45.1 34.0 - 48.0 %    MCV 93.0 80.0 - 99.9 fL    MCH 30.7 26.0 - 35.0 pg    MCHC 33.0 32.0 - 34.5 %    RDW 13.9 11.5 - 15.0 fL    Platelets 205 926 - 659 E9/L    MPV 10.2 7.0 - 12.0 fL    Neutrophils % 85.3 (H) 43.0 - 80.0 %    Immature Granulocytes % 0.3 0.0 - 5.0 %    Lymphocytes % 7.2 (L) 20.0 - 42.0 %    Monocytes % 5.9 2.0 - 12.0 %    Eosinophils % 1.0 0.0 - 6.0 %    Basophils % 0.3 0.0 - 2.0 %    Neutrophils Absolute 11.40 (H) 1.80 - 7.30 E9/L    Immature Granulocytes # 0.04 E9/L    Lymphocytes Absolute 0.96 (L) 1.50 - 4.00 E9/L    Monocytes Absolute 0.79 0.10 - 0.95 E9/L    Eosinophils Absolute 0.14 0.05 - 0.50 E9/L    Basophils Absolute 0.04 0.00 - 0.20 E9/L   Comprehensive Metabolic Panel w/ Reflex to MG   Result Value Ref Range    Sodium 141 132 - 146 mmol/L    Potassium reflex Magnesium 4.4 3.5 - 5.0 mmol/L    Chloride 102 98 - 107 mmol/L    CO2 26 22 - 29 mmol/L    Anion Gap 13 7 - 16 mmol/L    Glucose 127 (H) 74 - 99 mg/dL    BUN 33 (H) 6 - 23 mg/dL    Creatinine 1.4 (H) 0.5 - 1.0 mg/dL    GFR Non-African American 37 >=60 mL/min/1.73    GFR African American 45     Calcium 9.6 8.6 - 10.2 mg/dL    Total Protein 7.6 6.4 - 8.3 g/dL    Albumin 4.2 3.5 - 5.2 g/dL    Total Bilirubin 0.4 0.0 - 1.2 mg/dL    Alkaline Phosphatase 72 35 - 104 U/L    ALT 17 0 - 32 U/L    AST 16 0 - 31 U/L   Troponin   Result Value Ref Range    Troponin, High Sensitivity 24 (H) 0 - 9 ng/L   Urinalysis with Microscopic   Result Value Ref Range    Color, UA Yellow Straw/Yellow    Clarity, UA Clear Clear    Glucose, Ur Negative Negative mg/dL    Bilirubin Urine Negative Negative    Ketones, Urine Negative Negative mg/dL    Specific Gravity, UA 1.025 1.005 - 1.030    Blood, Urine TRACE-INTACT Negative    pH, UA 6.0 5.0 - 9.0    Protein, UA >=300 (A) Negative mg/dL    Urobilinogen, Urine 0.2 <2.0 E.U./dL    Nitrite, Urine Negative Negative    Leukocyte Esterase, Urine Negative Negative    WBC, UA NONE 0 - 5 /HPF    RBC, UA NONE 0 - 2 /HPF    Epithelial Cells, UA NONE SEEN /HPF    Bacteria, UA NONE SEEN None Seen /HPF   Lactate, Sepsis   Result Value Ref Range    Lactic Acid, Sepsis 0.9 0.5 - 1.9 mmol/L   Troponin   Result Value Ref Range    Troponin, High Sensitivity 25 (H) 0 - 9 ng/L   Lipid Panel   Result Value Ref Range    Cholesterol, Total 152 0 - 199 mg/dL    Triglycerides 160 (H) 0 - 149 mg/dL    HDL 44 >40 mg/dL    LDL Calculated 76 0 - 99 mg/dL    VLDL Cholesterol Calculated 32 mg/dL   EKG 12 Lead   Result Value Ref Range    Ventricular Rate 90 BPM    Atrial Rate 96 BPM    P-R Interval 116 ms    QRS Duration 178 ms    Q-T Interval 414 ms    QTc Calculation (Bazett) 506 ms    P Axis -51 degrees    R Axis -90 degrees    T Axis 50 degrees   ,       RADIOLOGY:  Interpreted by Radiologist unless otherwise specified  CT CHEST WO CONTRAST   Final Result   1. Irregular opacities located in the upper lobes likely related to scarring   and atelectasis. 2. No obvious pneumonia or pleural effusion. 3. Mediastinal lymphadenopathy. 4. Noncalcified nodule measuring 5 mm in right upper lobe. Follow-up   recommended as indicated below.       RECOMMENDATIONS:   Fleischner Society guidelines for follow-up and management of incidentally   detected pulmonary nodules:      Single Solid Nodule: Nodule size less than 6 mm   In a low-risk patient, no routine follow-up. In a high-risk patient, optional CT at 12 months. Nodule size equals 6-8 mm   In a low-risk patient, CT at 6-12 months, then consider CT at 18-24 months. In a high-risk patient, CT at 6-12 months, then CT at 18-24 months. Nodule size greater than 8 mm         In a low-risk patient, consider CT at 3 months, PET/CT, or tissue sampling. In a high-risk patient, consider CT at 3 months, PET/CT, or tissue sampling. Multiple Solid Nodules:      Nodule size less than 6 mm   In a low-risk patient, no routine follow-up. In a high-risk patient, optional CT at 12 months. Nodule size equals 6-8 mm   In a low-risk patient, CT at 3-6 months, then consider CT at 18-24 months. In a high-risk patient, CT at 3-6 months, then CT at 18-24 months. Nodule size greater than 8 mm   In a low-risk patient, CT at 3-6 months, then consider CT at 18-24 months. In a high-risk patient, CT at 3-6 months, then CT at 18-24 months. - Low risk patients include individuals with minimal or absent history of   smoking and other known risk factors. - High risk patients include individuals with a history or smoking or known   risk factors. Radiology 2017 http://pubs. rsna.org/doi/full/10.1148/radiol. 6190520838         CT ABDOMEN PELVIS WO CONTRAST Additional Contrast? None   Final Result   1. Diverticulosis. 2. Cholelithiasis   3. Nonobstructing left renal calculi. 4. No bowel obstruction, free air, or focal inflammatory changes. 5. Stable mass along right aspect of the uterus likely represents a stable   fibroid. XR CHEST PORTABLE   Final Result   No acute process.                           ------------------------- NURSING NOTES AND VITALS REVIEWED ---------------------------   The nursing notes within the ED encounter and vital signs as below have been reviewed by myself  /71   Pulse 88   Temp 99.4 °F (37.4 °C) (Oral)   Resp 18   Ht 5' 4\" (1.626 m)   Wt 220 lb (99.8 kg)   SpO2 98%   BMI 37.76 kg/m²     Oxygen Saturation Interpretation: Abnormal - but at baseline      The patients available past medical records and past encounters were reviewed. ------------------------------ ED COURSE/MEDICAL DECISION MAKING----------------------  Medications   atorvastatin (LIPITOR) tablet 40 mg (40 mg Oral Given 7/27/22 2038)   NIFEdipine (ADALAT CC) extended release tablet 60 mg (has no administration in time range)   sodium chloride flush 0.9 % injection 5-40 mL (has no administration in time range)   sodium chloride flush 0.9 % injection 5-40 mL (has no administration in time range)   0.9 % sodium chloride infusion (has no administration in time range)   polyethylene glycol (GLYCOLAX) packet 17 g (has no administration in time range)   acetaminophen (TYLENOL) tablet 650 mg (650 mg Oral Given 7/27/22 2038)     Or   acetaminophen (TYLENOL) suppository 650 mg ( Rectal See Alternative 7/27/22 2038)   0.9 % sodium chloride infusion ( IntraVENous New Bag 7/27/22 2041)   vancomycin 2000 mg in dextrose 5% 500 ml IVPB (has no administration in time range)   apixaban (ELIQUIS) tablet 5 mg (5 mg Oral Given 7/27/22 2038)   trimethobenzamide (TIGAN) injection 200 mg (has no administration in time range)   0.9 % sodium chloride bolus (0 mLs IntraVENous Stopped 7/27/22 1424)   ondansetron (ZOFRAN) injection 4 mg (4 mg IntraVENous Given 7/27/22 1332)   ketorolac (TORADOL) injection 15 mg (15 mg IntraVENous Given 7/27/22 1332)   piperacillin-tazobactam (ZOSYN) 4,500 mg in dextrose 5 % 100 mL IVPB (mini-bag) (4,500 mg IntraVENous New Bag 7/27/22 1812)            Medical Decision Making:     Patient presents with shortness of breath, nausea, cough, no other upper respiratory symptoms. Zofran given for nausea. Toradol given for fever. Patient was started on fluids. CBC did show mild leukocytosis.   COVID and influenza were negative. CMP showed patient's baseline CKD. Urinalysis was without signs of infection. CTA chest did show a lung nodule but no pneumonia. CT abdomen pelvis did not show any acute intra-abdominal pathology. Currently no source of infection. Cultures were drawn and are pending. Patient has low concern for meningitis with no altered mental status and negative neck rigidity or tenderness. Broad-spectrum antibiotics were started with Zosyn. Patient be admitted for further evaluation and care. ED Course as of 07/27/22 2059 Wed Jul 27, 2022 1737 Consulted with family [facility] resident who will admit the patient. [KG]   1927 EKG shows sinus rhythm with a ventricular rate 90 bpm.  There is a right bundle branch block present. EKG does not meet STEMI criteria. [BB]      ED Course User Index  [BB] Higinio Orantes DO  [KG] Faby Simpson DO        Re-Evaluations:  Patient was reevaluted and continue to be stable. This patient's ED course included: a personal history and physicial examination, multiple bedside re-evaluations, IV medications, and cardiac monitoring    This patient has remained hemodynamically stable during their ED course. Consultations:  None      Critical Care: None      Counseling: The emergency provider has spoken with the patient and discussed todays results, in addition to providing specific details for the plan of care and counseling regarding the diagnosis and prognosis. Questions are answered at this time and they are agreeable with the plan.       --------------------------------- IMPRESSION AND DISPOSITION ---------------------------------    IMPRESSION  1. Fever of unknown origin        DISPOSITION  Disposition: Admit to telemetry  Patient condition is stable    Patient was seen and evaluated by both myself and Alethea Colon DO. NOTE: This report was transcribed using voice recognition software.  Every effort was made to ensure accuracy; however, inadvertent computerized transcription errors may be present           Tahira Kimble DO  Resident  07/27/22 [id number]

## 2022-07-27 NOTE — H&P
[address] [facility]  Resident History and Physical      CHIEF COMPLAINT:  FEVER       History of Present Illness:   Aren Nelson  is a 67 y.o. female patient of Lu Garcia DO  with a pertinent PMHx of CAD status post CABG 2016 , A. Fib, hypertension, aortic and mitral valve repair, DVT in 2016, GI bleed, interstitial fibrosis, lymphoma, hyperlipidemia and morbid obesity who presented to the ER from with home with chief complaint of chills, nausea, fatigue. Patient stated that she has been dry heaving a lot for 3 to 4 days now. She denied any vomitus. Patient stated that she is also having some chills but denied checking her temperature at home. She stated she was seen at cardiology and pulmonology clinic last week and was started on medicines. Patient states that she has not followed up with her PCP and not on any medicines until recently. Patient stated that she has been using oxygen as needed. Patient reported her blood pressure last week was in high 634V systolic at [facility] clinic and was started on nifedipine. Patient denies any chest pain, diaphoresis, dyspnea, dyspnea on exertion, peripheral edema, palpitations, headache, visual changes, fall, trauma, recent travels. In the ER, blood pressure 126/72, pulse 88, 2 L nasal cannula saturating at 97%, fever 101.3. Labs showed creatinine 1.4, which is her baseline, troponins 24 with repeat 25, WBC 13.4, normal lactate, COVID and flu was negative. UA showed proteinuria but no blood or nitrites. Chest x-ray was normal.  EKG showed ectopic atrial rhythm, LAD, right bundle branch block. CT chest showed bilateral opacities located in upper lobes likely related to scarring or atelectasis, noncalcified nodule measuring 5 mm right upper lobe.  CT abdomen pelvis showed multiple nonobstructing 1 to 2 mm left renal calculi, stable mass in the uterus representing fibroid, diverticulosis, cholelithiasis, no free air or bowel obstruction. Patient was given 1 L saline bolus and a dose of Toradol and 1 dose of Zofran in the ER.    ROS:   Review of Systems   Constitutional:  Positive for chills and fatigue. Negative for fever. HENT:  Negative for congestion, sore throat and trouble swallowing. Respiratory:  Negative for cough. Cardiovascular:  Negative for chest pain and leg swelling. Gastrointestinal:  Positive for nausea. Negative for abdominal pain, constipation, diarrhea and vomiting. Genitourinary:  Negative for difficulty urinating. Musculoskeletal:  Negative for arthralgias and myalgias. Skin:  Negative for rash and wound. Neurological:  Negative for dizziness and headaches. Psychiatric/Behavioral:  Negative for agitation. Past Medical History:   Diagnosis Date    A-fib [facility])     ARF (acute renal failure) ([facility] [address]     Cancer ([facility] [address]     lymphoma    Cardiac dysfunction     Diabetes mellitus ([facility] [address]     Diverticulitis     History of deep vein thrombosis 11/3/2016    Hx of blood clots     Poor venous access 11/4/2016    S/P AVR     S/P IVC filter 11/3/2016    Traumatic seroma of left thigh ([facility] [address] 11/16/2016    Ventilator dependent ([facility] [address]          Past Surgical History:   Procedure Laterality Date    AORTIC VALVE REPLACEMENT      CARDIAC SURGERY      \"homograph\" valve replacement    COLONOSCOPY      ENDOSCOPY, COLON, DIAGNOSTIC      GASTROSTOMY TUBE PLACEMENT  09/26/2016    THORACENTESIS  10/14/2016    VENA CAVA FILTER PLACEMENT Right 10/14/2016       Medications Prior to Admission:    Prior to Admission medications    Medication Sig Start Date End Date Taking?  Authorizing Provider   albuterol sulfate HFA (PROVENTIL;VENTOLIN;PROAIR) 108 (90 Base) MCG/ACT inhaler Inhale 2 puffs into the lungs every 4 hours as needed for Wheezing or Shortness of Breath   Yes Historical Provider, MD   furosemide (LASIX) 40 MG tablet Take 40 mg by mouth nightly   Yes Historical Provider, MD   atorvastatin (LIPITOR) 40 MG tablet Take 40 mg by mouth nightly   Yes Historical Provider, MD   OXYGEN Inhale 1 L/min into the lungs as needed (WHEEZING/SHORTNESS OF BREATH)   Yes Historical Provider, MD   Multiple Vitamins-Minerals (THERAPEUTIC MULTIVITAMIN-MINERALS) tablet Take 1 tablet by mouth in the morning. Yes Historical Provider, MD   polyvinyl alcohol (LIQUIFILM TEARS) 1.4 % ophthalmic solution Place 1 drop into both eyes as needed for Dry Eyes    Historical Provider, MD        Allergies:   Protamine, Ciprofloxacin, Hydrocodone-acetaminophen, Oxycodone-acetaminophen, and Vancomycin    Social History:    reports that she has never smoked. She has never used smokeless tobacco. She reports that she does not drink alcohol and does not use drugs. Family History:   family history is not on file. PHYSICAL EXAM:    Vitals:  /60   Pulse 81   Temp 99.2 °F (37.3 °C) (Oral)   Resp 16   Ht 5' 4\" (1.626 m)   Wt 220 lb (99.8 kg)   SpO2 96%   BMI 37.76 kg/m²     Physical Exam  Constitutional:       Appearance: Normal appearance. Comments: 2 L nasal cannula   HENT:      Head: Normocephalic. Nose: Nose normal. No rhinorrhea. Eyes:      General: No scleral icterus. Conjunctiva/sclera: Conjunctivae normal.   Cardiovascular:      Rate and Rhythm: Normal rate. Pulses: Normal pulses. Heart sounds: Normal heart sounds. Pulmonary:      Breath sounds: Normal breath sounds. No wheezing, rhonchi or rales. Abdominal:      General: Abdomen is flat. Bowel sounds are normal.   Musculoskeletal:      Right lower leg: No edema. Left lower leg: No edema. Skin:     General: Skin is warm. Findings: No rash. Neurological:      General: No focal deficit present. Mental Status: She is alert.        LABS:  Recent Results (from the past 24 hour(s))   Lactate, Sepsis    Collection Time: 07/27/22  1:01 PM   Result Value Ref Range    Lactic Acid, Sepsis 0.9 0.5 - 1.9 mmol/L   CBC with Auto Differential    Collection Time: 07/27/22  1:03 PM   Result Value Ref Range    WBC 13.4 (H) 4.5 - 11.5 E9/L    RBC 4.85 3.50 - 5.50 E12/L    Hemoglobin 14.9 11.5 - 15.5 g/dL    Hematocrit 45.1 34.0 - 48.0 %    MCV 93.0 80.0 - 99.9 fL    MCH 30.7 26.0 - 35.0 pg    MCHC 33.0 32.0 - 34.5 %    RDW 13.9 11.5 - 15.0 fL    Platelets 876 085 - 994 E9/L    MPV 10.2 7.0 - 12.0 fL    Neutrophils % 85.3 (H) 43.0 - 80.0 %    Immature Granulocytes % 0.3 0.0 - 5.0 %    Lymphocytes % 7.2 (L) 20.0 - 42.0 %    Monocytes % 5.9 2.0 - 12.0 %    Eosinophils % 1.0 0.0 - 6.0 %    Basophils % 0.3 0.0 - 2.0 %    Neutrophils Absolute 11.40 (H) 1.80 - 7.30 E9/L    Immature Granulocytes # 0.04 E9/L    Lymphocytes Absolute 0.96 (L) 1.50 - 4.00 E9/L    Monocytes Absolute 0.79 0.10 - 0.95 E9/L    Eosinophils Absolute 0.14 0.05 - 0.50 E9/L    Basophils Absolute 0.04 0.00 - 0.20 E9/L   Comprehensive Metabolic Panel w/ Reflex to MG    Collection Time: 07/27/22  1:03 PM   Result Value Ref Range    Sodium 141 132 - 146 mmol/L    Potassium reflex Magnesium 4.4 3.5 - 5.0 mmol/L    Chloride 102 98 - 107 mmol/L    CO2 26 22 - 29 mmol/L    Anion Gap 13 7 - 16 mmol/L    Glucose 127 (H) 74 - 99 mg/dL    BUN 33 (H) 6 - 23 mg/dL    Creatinine 1.4 (H) 0.5 - 1.0 mg/dL    GFR Non-African American 37 >=60 mL/min/1.73    GFR African American 45     Calcium 9.6 8.6 - 10.2 mg/dL    Total Protein 7.6 6.4 - 8.3 g/dL    Albumin 4.2 3.5 - 5.2 g/dL    Total Bilirubin 0.4 0.0 - 1.2 mg/dL    Alkaline Phosphatase 72 35 - 104 U/L    ALT 17 0 - 32 U/L    AST 16 0 - 31 U/L   Troponin    Collection Time: 07/27/22  1:03 PM   Result Value Ref Range    Troponin, High Sensitivity 24 (H) 0 - 9 ng/L   Urinalysis with Microscopic    Collection Time: 07/27/22  1:03 PM   Result Value Ref Range    Color, UA Yellow Straw/Yellow    Clarity, UA Clear Clear    Glucose, Ur Negative Negative mg/dL    Bilirubin Urine Negative Negative    Ketones, Urine Negative Negative mg/dL    Specific Gravity, UA 1.025 1.005 - 1.030    Blood, Urine TRACE-INTACT Negative    pH, UA 6.0 5.0 - 9.0    Protein, UA >=300 (A) Negative mg/dL    Urobilinogen, Urine 0.2 <2.0 E.U./dL    Nitrite, Urine Negative Negative    Leukocyte Esterase, Urine Negative Negative    WBC, UA NONE 0 - 5 /HPF    RBC, UA NONE 0 - 2 /HPF    Epithelial Cells, UA NONE SEEN /HPF    Bacteria, UA NONE SEEN None Seen /HPF   COVID-19, Rapid    Collection Time: 07/27/22  1:03 PM    Specimen: Nasopharyngeal Swab   Result Value Ref Range    SARS-CoV-2, NAAT Not Detected Not Detected   RAPID INFLUENZA A/B ANTIGENS    Collection Time: 07/27/22  1:03 PM    Specimen: Nasopharyngeal   Result Value Ref Range    Influenza A by PCR Not Detected Not Detected    Influenza B by PCR Not Detected Not Detected   EKG 12 Lead    Collection Time: 07/27/22  1:15 PM   Result Value Ref Range    Ventricular Rate 90 BPM    Atrial Rate 96 BPM    P-R Interval 116 ms    QRS Duration 178 ms    Q-T Interval 414 ms    QTc Calculation (Bazett) 506 ms    P Axis -51 degrees    R Axis -90 degrees    T Axis 50 degrees   Troponin    Collection Time: 07/27/22  2:28 PM   Result Value Ref Range    Troponin, High Sensitivity 25 (H) 0 - 9 ng/L       CT CHEST WO CONTRAST   Final Result   1. Irregular opacities located in the upper lobes likely related to scarring   and atelectasis. 2. No obvious pneumonia or pleural effusion. 3. Mediastinal lymphadenopathy. 4. Noncalcified nodule measuring 5 mm in right upper lobe. Follow-up   recommended as indicated below. RECOMMENDATIONS:   Fleischner Society guidelines for follow-up and management of incidentally   detected pulmonary nodules:      Single Solid Nodule:      Nodule size less than 6 mm   In a low-risk patient, no routine follow-up. In a high-risk patient, optional CT at 12 months. Nodule size equals 6-8 mm   In a low-risk patient, CT at 6-12 months, then consider CT at 18-24 months. In a high-risk patient, CT at 6-12 months, then CT at 18-24 months.       Nodule size greater than 8 mm         In a low-risk patient, consider CT at 3 months, PET/CT, or tissue sampling. In a high-risk patient, consider CT at 3 months, PET/CT, or tissue sampling. Multiple Solid Nodules:      Nodule size less than 6 mm   In a low-risk patient, no routine follow-up. In a high-risk patient, optional CT at 12 months. Nodule size equals 6-8 mm   In a low-risk patient, CT at 3-6 months, then consider CT at 18-24 months. In a high-risk patient, CT at 3-6 months, then CT at 18-24 months. Nodule size greater than 8 mm   In a low-risk patient, CT at 3-6 months, then consider CT at 18-24 months. In a high-risk patient, CT at 3-6 months, then CT at 18-24 months. - Low risk patients include individuals with minimal or absent history of   smoking and other known risk factors. - High risk patients include individuals with a history or smoking or known   risk factors. Radiology 2017 http://pubs. rsna.org/doi/full/10.1148/radiol. 0459458350         CT ABDOMEN PELVIS WO CONTRAST Additional Contrast? None   Final Result   1. Diverticulosis. 2. Cholelithiasis   3. Nonobstructing left renal calculi. 4. No bowel obstruction, free air, or focal inflammatory changes. 5. Stable mass along right aspect of the uterus likely represents a stable   fibroid. XR CHEST PORTABLE   Final Result   No acute process.              ASSESSMENT/PLAN:      Active Hospital Problems    Diagnosis Date Noted    Fever in adult [R50.9] 07/27/2022     Priority: Medium    Leukocytosis [D72.829] 07/27/2022     Priority: Medium    Stage 3b chronic kidney disease ([facility] [address] [N18.32] 07/27/2022     Priority: Medium    Essential hypertension [I10] 10/11/2018    PAF (paroxysmal atrial fibrillation) (HCC) [I48.0] 12/16/2016    S/P MVR (mitral valve repair) [Z98.890]      Fever  She reported chills at home, temp in the .3, normal lactate, normal HR , s/p 1L NS bolus, likely SIRS ( WBC count and temp)  Leukocytosis, monitor CBC daily  Received IV zosyn once, will hold off antibiotics for now. Continue IVF at 125 cc/hr. Awaiting blood cultures  Will order RFA panel, ESR, ferritin, procalcitonin. Wean oxygen as tolerated     Nausea   Prolonged Qtc, will start Tigan 200mg q6h prn   EGD 2016 : negative  Monitor for now. A. Fib  Not on anticoagulants  Continue telemetry  Continue CCB for now  [id number] : 3  Will start her on eliquis 5mg BID    HTN  Continue nifedipine 60 mg daily for now  Monitor daily Bmp    CKD stage 3b  Creatinine 1.3 today, baseline is 1.4-1.5  Monitor bmp    DM   A1C: 6.7 on 06/21, not on any meds at home  Ordered A1C and lipid panel  Hypoglycemia protocols   POCT glucose checks    HLD  Continue lipitor 40 mg daily  Recheck lipid panel    Coronary artery disease  S/p CABG 2016, ECHO 2016 showed 65% ejection fraction. Follows with [facility]    DVT ppx: eliquis   GI ppx: None  Code Status: Full  Diet: General diet      Case discussed with attending on call Dr. Luiz Garcia.     Amrik Finney MD MD  Family Medicine Resident Physician PGY-3  7/27/2022

## 2022-07-28 ENCOUNTER — APPOINTMENT (OUTPATIENT)
Dept: GENERAL RADIOLOGY | Age: 72
DRG: 864 | End: 2022-07-28
Payer: MEDICARE

## 2022-07-28 LAB
ADENOVIRUS BY PCR: NOT DETECTED
ALBUMIN SERPL-MCNC: 3.8 G/DL (ref 3.5–5.2)
ALP BLD-CCNC: 73 U/L (ref 35–104)
ALT SERPL-CCNC: 28 U/L (ref 0–32)
ANION GAP SERPL CALCULATED.3IONS-SCNC: 14 MMOL/L (ref 7–16)
AST SERPL-CCNC: 31 U/L (ref 0–31)
BASOPHILS ABSOLUTE: 0.05 E9/L (ref 0–0.2)
BASOPHILS RELATIVE PERCENT: 0.3 % (ref 0–2)
BILIRUB SERPL-MCNC: 0.8 MG/DL (ref 0–1.2)
BORDETELLA PARAPERTUSSIS BY PCR: NOT DETECTED
BORDETELLA PERTUSSIS BY PCR: NOT DETECTED
BUN BLDV-MCNC: 35 MG/DL (ref 6–23)
CALCIUM SERPL-MCNC: 8.9 MG/DL (ref 8.6–10.2)
CHLAMYDOPHILIA PNEUMONIAE BY PCR: NOT DETECTED
CHLORIDE BLD-SCNC: 104 MMOL/L (ref 98–107)
CO2: 20 MMOL/L (ref 22–29)
CORONAVIRUS 229E BY PCR: NOT DETECTED
CORONAVIRUS HKU1 BY PCR: NOT DETECTED
CORONAVIRUS NL63 BY PCR: NOT DETECTED
CORONAVIRUS OC43 BY PCR: NOT DETECTED
CREAT SERPL-MCNC: 1.6 MG/DL (ref 0.5–1)
CREATININE URINE: 48 MG/DL (ref 29–226)
CREATININE URINE: 50 MG/DL (ref 29–226)
EKG ATRIAL RATE: 76 BPM
EKG ATRIAL RATE: 81 BPM
EKG P-R INTERVAL: 122 MS
EKG P-R INTERVAL: 184 MS
EKG Q-T INTERVAL: 442 MS
EKG Q-T INTERVAL: 462 MS
EKG QRS DURATION: 178 MS
EKG QRS DURATION: 186 MS
EKG QTC CALCULATION (BAZETT): 513 MS
EKG QTC CALCULATION (BAZETT): 519 MS
EKG R AXIS: -57 DEGREES
EKG R AXIS: -71 DEGREES
EKG T AXIS: 33 DEGREES
EKG T AXIS: 40 DEGREES
EKG VENTRICULAR RATE: 76 BPM
EKG VENTRICULAR RATE: 81 BPM
EOSINOPHILS ABSOLUTE: 0.1 E9/L (ref 0.05–0.5)
EOSINOPHILS RELATIVE PERCENT: 0.6 % (ref 0–6)
FERRITIN: 259 NG/ML
GFR AFRICAN AMERICAN: 38
GFR NON-AFRICAN AMERICAN: 32 ML/MIN/1.73
GLUCOSE BLD-MCNC: 199 MG/DL (ref 74–99)
HCT VFR BLD CALC: 42.3 % (ref 34–48)
HEMOGLOBIN: 13.7 G/DL (ref 11.5–15.5)
HUMAN METAPNEUMOVIRUS BY PCR: NOT DETECTED
HUMAN RHINOVIRUS/ENTEROVIRUS BY PCR: NOT DETECTED
IMMATURE GRANULOCYTES #: 0.06 E9/L
IMMATURE GRANULOCYTES %: 0.4 % (ref 0–5)
INFLUENZA A BY PCR: NOT DETECTED
INFLUENZA B BY PCR: NOT DETECTED
LYMPHOCYTES ABSOLUTE: 0.78 E9/L (ref 1.5–4)
LYMPHOCYTES RELATIVE PERCENT: 4.9 % (ref 20–42)
MCH RBC QN AUTO: 30.4 PG (ref 26–35)
MCHC RBC AUTO-ENTMCNC: 32.4 % (ref 32–34.5)
MCV RBC AUTO: 94 FL (ref 80–99.9)
MICROALBUMIN UR-MCNC: 111.2 MG/L
MICROALBUMIN/CREAT UR-RTO: 222.4 (ref 0–30)
MONOCYTES ABSOLUTE: 0.99 E9/L (ref 0.1–0.95)
MONOCYTES RELATIVE PERCENT: 6.3 % (ref 2–12)
MYCOPLASMA PNEUMONIAE BY PCR: NOT DETECTED
NEUTROPHILS ABSOLUTE: 13.86 E9/L (ref 1.8–7.3)
NEUTROPHILS RELATIVE PERCENT: 87.5 % (ref 43–80)
PARAINFLUENZA VIRUS 1 BY PCR: NOT DETECTED
PARAINFLUENZA VIRUS 2 BY PCR: NOT DETECTED
PARAINFLUENZA VIRUS 3 BY PCR: NOT DETECTED
PARAINFLUENZA VIRUS 4 BY PCR: NOT DETECTED
PARATHYROID HORMONE INTACT: 196 PG/ML (ref 15–65)
PDW BLD-RTO: 14.1 FL (ref 11.5–15)
PLATELET # BLD: 173 E9/L (ref 130–450)
PMV BLD AUTO: 10.3 FL (ref 7–12)
POTASSIUM REFLEX MAGNESIUM: 4.5 MMOL/L (ref 3.5–5)
PROCALCITONIN: 0.1 NG/ML (ref 0–0.08)
PROTEIN PROTEIN: 19 MG/DL (ref 0–12)
PROTEIN/CREAT RATIO: 0.4
PROTEIN/CREAT RATIO: 0.4 (ref 0–0.2)
RBC # BLD: 4.5 E12/L (ref 3.5–5.5)
REASON FOR REJECTION: NORMAL
REJECTED TEST: NORMAL
RESPIRATORY SYNCYTIAL VIRUS BY PCR: NOT DETECTED
SARS-COV-2, PCR: NOT DETECTED
SEDIMENTATION RATE, ERYTHROCYTE: 40 MM/HR (ref 0–20)
SODIUM BLD-SCNC: 138 MMOL/L (ref 132–146)
TOTAL CK: 204 U/L (ref 20–180)
TOTAL PROTEIN: 7.1 G/DL (ref 6.4–8.3)
TROPONIN, HIGH SENSITIVITY: 116 NG/L (ref 0–9)
TROPONIN, HIGH SENSITIVITY: 191 NG/L (ref 0–9)
VITAMIN D 25-HYDROXY: 25 NG/ML (ref 30–100)
WBC # BLD: 15.8 E9/L (ref 4.5–11.5)

## 2022-07-28 PROCEDURE — 84156 ASSAY OF PROTEIN URINE: CPT

## 2022-07-28 PROCEDURE — 99222 1ST HOSP IP/OBS MODERATE 55: CPT | Performed by: FAMILY MEDICINE

## 2022-07-28 PROCEDURE — C9113 INJ PANTOPRAZOLE SODIUM, VIA: HCPCS

## 2022-07-28 PROCEDURE — 36415 COLL VENOUS BLD VENIPUNCTURE: CPT

## 2022-07-28 PROCEDURE — 83970 ASSAY OF PARATHORMONE: CPT

## 2022-07-28 PROCEDURE — 85025 COMPLETE CBC W/AUTO DIFF WBC: CPT

## 2022-07-28 PROCEDURE — 2580000003 HC RX 258

## 2022-07-28 PROCEDURE — 84484 ASSAY OF TROPONIN QUANT: CPT

## 2022-07-28 PROCEDURE — 93005 ELECTROCARDIOGRAM TRACING: CPT | Performed by: FAMILY MEDICINE

## 2022-07-28 PROCEDURE — 99223 1ST HOSP IP/OBS HIGH 75: CPT | Performed by: INTERNAL MEDICINE

## 2022-07-28 PROCEDURE — 2580000003 HC RX 258: Performed by: FAMILY MEDICINE

## 2022-07-28 PROCEDURE — 82570 ASSAY OF URINE CREATININE: CPT

## 2022-07-28 PROCEDURE — 80053 COMPREHEN METABOLIC PANEL: CPT

## 2022-07-28 PROCEDURE — 82550 ASSAY OF CK (CPK): CPT

## 2022-07-28 PROCEDURE — A4216 STERILE WATER/SALINE, 10 ML: HCPCS

## 2022-07-28 PROCEDURE — 2700000000 HC OXYGEN THERAPY PER DAY

## 2022-07-28 PROCEDURE — 71045 X-RAY EXAM CHEST 1 VIEW: CPT

## 2022-07-28 PROCEDURE — 6370000000 HC RX 637 (ALT 250 FOR IP): Performed by: INTERNAL MEDICINE

## 2022-07-28 PROCEDURE — 82044 UR ALBUMIN SEMIQUANTITATIVE: CPT

## 2022-07-28 PROCEDURE — 82306 VITAMIN D 25 HYDROXY: CPT

## 2022-07-28 PROCEDURE — 2060000000 HC ICU INTERMEDIATE R&B

## 2022-07-28 PROCEDURE — 6370000000 HC RX 637 (ALT 250 FOR IP): Performed by: FAMILY MEDICINE

## 2022-07-28 PROCEDURE — 6360000002 HC RX W HCPCS

## 2022-07-28 PROCEDURE — 0202U NFCT DS 22 TRGT SARS-COV-2: CPT

## 2022-07-28 PROCEDURE — 97161 PT EVAL LOW COMPLEX 20 MIN: CPT

## 2022-07-28 PROCEDURE — 93005 ELECTROCARDIOGRAM TRACING: CPT

## 2022-07-28 RX ORDER — FUROSEMIDE 40 MG/1
40 TABLET ORAL DAILY
Status: DISCONTINUED | OUTPATIENT
Start: 2022-07-28 | End: 2022-07-29 | Stop reason: HOSPADM

## 2022-07-28 RX ADMIN — Medication 10 ML: at 08:07

## 2022-07-28 RX ADMIN — FUROSEMIDE 40 MG: 40 TABLET ORAL at 11:06

## 2022-07-28 RX ADMIN — SODIUM CHLORIDE 40 MG: 9 INJECTION, SOLUTION INTRAMUSCULAR; INTRAVENOUS; SUBCUTANEOUS at 08:11

## 2022-07-28 RX ADMIN — ATORVASTATIN CALCIUM 40 MG: 40 TABLET, FILM COATED ORAL at 20:51

## 2022-07-28 RX ADMIN — APIXABAN 5 MG: 5 TABLET, FILM COATED ORAL at 08:06

## 2022-07-28 RX ADMIN — Medication 10 ML: at 23:16

## 2022-07-28 NOTE — CARE COORDINATION
Met with patient about diagnosis and discharge plan of care. Pt admit with fever, chills at home. Currently on iv fluids. Pt has hx of lung disease and cabgx5. Follow with pulmonary and cardiology in [facility]. Lives with spouse and children in ranch home. Has home o2 1l/nc prn. Currently on 4L/NC. Unsure of home o2 company. Left vm with spouse. Pt independent prior to admit. No DME. Plan is home with no needs. Pt PCP is Dr Akilah Lynch but she is trying to find another. Will continue to follow-mjo    The Plan for Transition of Care is related to the following treatment goals: home     The Patient and/or patient representative pt  was provided with a choice of provider and agrees   with the discharge plan. [x] Yes [] No    Freedom of choice list was provided with basic dialogue that supports the patient's individualized plan of care/goals, treatment preferences and shares the quality data associated with the providers.  [x] Yes [] No

## 2022-07-28 NOTE — CONSULTS
INPATIENT CARDIOLOGY CONSULT    Name: Karely Anaya    Age: 67 y.o. Date of Admission: 7/27/2022 12:24 PM    Date of Service: 7/28/2022    Reason for Consultation: Febrile illness, abnormal troponin, coronary atherosclerosis, aortic valve disease, mitral valve disease, aortic root disease, hypertension, chronic kidney disease, moderate obesity    Referring Physician: Bernarda Lockhart MD    History of Present Illness: The patient is a 79-year-old white female remotely evaluated by [facility] and initial evaluation by Syeda Chávez and with subsequent cardiovascular evaluation provided at the [facility]. She has a known history of bioprosthetic aortic valve replacement in 1999 in the face of bicuspid aortic valve disease with repeat surgical intervention in August, 2016 inclusive of repeat aortic valve replacement as well as that of mitral valve repair and a saphenous vein graft to the right coronary artery in addition to that of replacement of her aortic root with the surgical procedure complicated by an apparent postoperative protamine reaction contributing to hypotension requiring extracorporeal membrane oxygenation as well as that of bacteremia and during recurrent atrial arrhythmias were noted requiring cardioversion to restore sinus rhythm and with subsequent development extremity venous thromboembolic disease with subsequent placement of an inferior vena caval interruption device and eventual transfer to a long-term acute care facility. She subsequently had maintained sinus rhythm with discontinuation of antiarrhythmic therapy and anticoagulation subsequent arrhythmia monitoring recommended albeit with the patient willing only to undergo short-term monitoring demonstrating reported evidence of paroxysmal supraventricular tachyarrhythmias but no clear documentation of atrial fibrillation the patient unwilling to undergo extended arrhythmia monitoring.   He additionally has a prior history of a gastrointestinal hemorrhage in addition to that of his stage IIIb chronic kidney disease and reported interstitial lung disease and moderate obesity with characteristics suggestive of obstructive sleep apnea but no formal assessment. She subsequently has a history of limited compliance with medication recommendations. He has undergone most recent objective assessment with a gated vasodilator myocardial perfusion imaging study in June, 2021 demonstrating no evidence of perfusion at base with normal left ventricular systolic function an echocardiogram earlier this month demonstrating normal left ventricular systolic function with biatrial enlargement and normal function of her aortic bioprosthesis (mean transaortic gradient 17 mmHg, dimensionless index 0.34) and mitral valve repair (mean transmitral gradient 4 mmHg) with trivial mitral regurgitation as well as that of her aortic root replacement. She was apparently hospitalized while visiting family in [place] with the initiation of diuretics as well as by her report the finding of atrial fibrillation with no additional recommendations for management by the cardiologist in attendance and no further alteration of arrhythmia management inclusive of anticoagulation time of her most recent follow-up within the past 2 weeks at the [facility] and during her assessment there evidence of stage II systolic hypertension was noted with the recommended initiation of nifedipine which she chose to ignore. Within the past week, she is noted the development of myalgias and dyspnea again with findings of hypertension and over the past 48 hours she has initiated nifedipine as recommended.   On the basis of her persistent symptoms, she presented to the emergency room where a resting electrocardiogram reviewed time evaluation demonstrated evidence of sinus rhythm with left anterior fascicular block and right bundle branch block conduction patterns of a chronic nature and unchanged from prior tracings as well as that of a chest x-ray again reviewed demonstrating no evidence of cardiomegaly and chronic interstitial changes both in comparison to prior radiographic studies and that described within her [facility] records. At the time of presentation she was noted to be febrile and throughout hospitalization with the exception of a single determination has remained normotensive. In the absence of additional vascular symptoms high-sensitivity troponin levels were obtained with initial values mildly elevated nonspecific with initial proBNP level 430 pg/mL. A presenting leukocytosis was noted and persistent on follow-up studies. Throughout the course of hospitalization, she denies symptoms of anginal-like chest discomfort or other ischemic equivalents with mild dyspnea and a persistent fever with mild elevation of procalcitonin levels. Serial troponin levels were obtained by primary care with a subsequent elevation relative to baseline noted in the absence of symptoms. At the time of present evaluation she is defervesced and denies significant active symptomatology. .    Review of Systems: The remainder of a complete multisystem review including consitutional, central nervous, respiratory, circulatory, gastrointestinal, genitourinary, endocrinologic, hematologic, musculoskeletal and psychiatric are negative.     Past Medical History:  Past Medical History:   Diagnosis Date    A-fib [facility])     ARF (acute renal failure) ([facility] [address]     Cancer ([facility] [address]     lymphoma    Cardiac dysfunction     Diabetes mellitus ([address]     Diverticulitis     History of deep vein thrombosis 11/3/2016    Hx of blood clots     Poor venous access 11/4/2016    S/P AVR     S/P IVC filter 11/3/2016    Traumatic seroma of left thigh ([address] 11/16/2016    Ventilator dependent ([facility] [address]        Past Surgical History:  Past Surgical History:   Procedure Laterality Date    AORTIC VALVE REPLACEMENT      CARDIAC SURGERY      \"homograph\" valve replacement    COLONOSCOPY      ENDOSCOPY, COLON, DIAGNOSTIC      GASTROSTOMY TUBE PLACEMENT  09/26/2016    THORACENTESIS  10/14/2016    VENA CAVA FILTER PLACEMENT Right 10/14/2016       Family History:  No family history on file. Social History:  Social History     Socioeconomic History    Marital status:      Spouse name: Not on file    Number of children: Not on file    Years of education: Not on file    Highest education level: Not on file   Occupational History    Not on file   Tobacco Use    Smoking status: Never    Smokeless tobacco: Never   Substance and Sexual Activity    Alcohol use: No    Drug use: No    Sexual activity: Not on file   Other Topics Concern    Not on file   Social History Narrative    Not on file     Social Determinants of Health     Financial Resource Strain: Not on file   Food Insecurity: Not on file   Transportation Needs: Not on file   Physical Activity: Not on file   Stress: Not on file   Social Connections: Not on file   Intimate Partner Violence: Not on file   Housing Stability: Not on file       Allergies: Allergies   Allergen Reactions    Protamine Anaphylaxis and Swelling     STAGE 3, ORGAN FAILURE    Ciprofloxacin Nausea And Vomiting and Other (See Comments)     Patient states \"it makes me very ill. \"    Hydrocodone-Acetaminophen Other (See Comments)     PATIENT STATES IT DOESN'T WORK    Oxycodone-Acetaminophen Other (See Comments)     PATIENT STATES IT DOESN'T WORK    Vancomycin Other (See Comments)     PATIENT STATES IT DOESN'T WORK       Home Medications:  Prior to Admission medications    Medication Sig Start Date End Date Taking?  Authorizing Provider   albuterol sulfate HFA (PROVENTIL;VENTOLIN;PROAIR) 108 (90 Base) MCG/ACT inhaler Inhale 2 puffs into the lungs every 4 hours as needed for Wheezing or Shortness of Breath   Yes Historical Provider, MD   furosemide (LASIX) 40 MG tablet Take 40 mg by mouth nightly Yes Historical Provider, MD   atorvastatin (LIPITOR) 40 MG tablet Take 40 mg by mouth nightly   Yes Historical Provider, MD   OXYGEN Inhale 1 L/min into the lungs as needed (WHEEZING/SHORTNESS OF BREATH)   Yes Historical Provider, MD   Multiple Vitamins-Minerals (THERAPEUTIC MULTIVITAMIN-MINERALS) tablet Take 1 tablet by mouth in the morning.    Yes Historical Provider, MD   polyvinyl alcohol (LIQUIFILM TEARS) 1.4 % ophthalmic solution Place 1 drop into both eyes as needed for Dry Eyes    Historical Provider, MD       Current Medications:  Current Facility-Administered Medications   Medication Dose Route Frequency Provider Last Rate Last Admin    pantoprazole (PROTONIX) 40 mg in sodium chloride (PF) 10 mL injection  40 mg IntraVENous Daily Payton Kyle MD   40 mg at 07/28/22 0811    furosemide (LASIX) tablet 40 mg  40 mg Oral Daily Alexandra Valdovinos MD   40 mg at 07/28/22 1106    atorvastatin (LIPITOR) tablet 40 mg  40 mg Oral Nightly Pretty Yates MD   40 mg at 07/27/22 2038    sodium chloride flush 0.9 % injection 5-40 mL  5-40 mL IntraVENous 2 times per day Pretty Yates MD   10 mL at 07/28/22 0807    sodium chloride flush 0.9 % injection 5-40 mL  5-40 mL IntraVENous PRN Pretty Yates MD        0.9 % sodium chloride infusion   IntraVENous PRN Pretty Yates MD        polyethylene glycol (GLYCOLAX) packet 17 g  17 g Oral Daily PRN Pretty Yates MD        acetaminophen (TYLENOL) tablet 650 mg  650 mg Oral Q6H PRN Pretty Yates MD   650 mg at 07/27/22 2038    Or    acetaminophen (TYLENOL) suppository 650 mg  650 mg Rectal Q6H PRN Pretty Yates MD        [Held by provider] apixaban (ELIQUIS) tablet 5 mg  5 mg Oral BID Pretty Yates MD   5 mg at 07/28/22 0806    trimethobenzamide (TIGAN) injection 200 mg  200 mg IntraMUSCular Q6H PRN Pretty Yates MD   200 mg at 07/27/22 2249    labetalol (NORMODYNE;TRANDATE) injection 5 mg  5 mg IntraVENous Q6H PRN Pretty Yates MD         Facility-Administered 07/28/2022 02:35 AM    MCV 94.0 07/28/2022 02:35 AM    MCH 30.4 07/28/2022 02:35 AM    MCHC 32.4 07/28/2022 02:35 AM    RDW 14.1 07/28/2022 02:35 AM     07/28/2022 02:35 AM    MPV 10.3 07/28/2022 02:35 AM     Recent Labs     07/27/22  1303 07/28/22  0235   ALKPHOS 72 73   ALT 17 28   AST 16 31   PROT 7.6 7.1   BILITOT 0.4 0.8   LABALBU 4.2 3.8     Lab Results   Component Value Date/Time    MG 1.7 10/31/2016 04:45 AM     Lab Results   Component Value Date/Time    PROTIME 33.8 12/31/2016 01:55 PM    INR 2.9 12/31/2016 01:55 PM     Lab Results   Component Value Date/Time    TSH 2.940 02/08/2018 12:00 PM     No components found for: CHLPL  Lab Results   Component Value Date    TRIG 160 (H) 07/27/2022    TRIG 383 (H) 01/22/2019    TRIG 229 (H) 02/08/2018     Lab Results   Component Value Date    HDL 44 07/27/2022    HDL 38 01/22/2019    HDL 33 02/08/2018     Lab Results   Component Value Date    LDLCALC 76 07/27/2022    LDLCALC 113 (H) 01/22/2019    LDLCALC 130 (H) 02/08/2018         Cardiac Tests:  ECG: A resting electrocardiogram reviewed time evaluation demonstrates evidence of sinus rhythm with left anterior fascicular block and right bundle branch block conduction patterns with no evolution during the course of hospitalization or compared to prior tracing  Telemetry findings reviewed: sinus rhythm, no new tachy/bradyarrhythmias overnight  Chest X-ray: A chest x-ray reviewed time evaluation demonstrates no evidence of cardiomegaly with chronic interstitial changes  Last Echocardiogram: An echocardiogram of July, 2022 from the [address] [place] [address]  demonstrates evidence of a normal-sized left ventricular chamber with normal left ventricular systolic function with biatrial enlargement and appropriate function of a bioprosthetic (23 mm trifecta bioprosthesis) with mean gradient of 17 mmHg, dimensionless index 0.36 and associated aortic root replacement as well as that of normal function of a mitral valve repair (32 mm Future ring) with a mean gradient of 4 mmHg and trivial aortic insufficiency  Last stress test: A gated vasodilator myocardial perfusion imaging study from the [facility] in June, 2021 demonstrated no evidence of perfusion at base with normal left ventricular systolic function      ASSESSMENT / PLAN: On a clinical basis, the patient presents with no active significant cardiovascular symptoms in the face of a febrile illness of uncertain origin. She specifically denies any significant active cardiovascular symptoms with no evidence of electrocardiographic abnormalities beyond that of her baseline noted including during serial electrocardiographic tracings of her present admission and in conjunction with her above referenced troponin abnormalities. A CK level in conjunction with her significantly elevated troponin level will be obtained with a follow-up troponin level presently ordered by the primary care service. Presently, no additional cardiovascular evaluation he is indicated at least pending the review of these results. Continued arrhythmia monitoring will be necessary in the absence of documentation of atrial arrhythmias outside the perioperative period, in spite of her existing embolic risk, anticoagulation is presently not indicated. To reduce risk of iatrogenic volume overload presently administered intravenous fluids will be discontinued with resumption of her present diuretics in part to assist antihypertensive management. Ongoing appropriate lifestyle modification to achieve weight reduction will be advisable with recommendation of a formal sleep assessment and as appropriate the initiation of nocturnal CPAP to reduce risk of adverse effects including that of diastolic heart failure and recurrent atrial arrhythmia provocation.   Ongoing aggressive risk factor modification of blood pressure and serum lipids will remain essential to reducing risk of future atherosclerotic development. Appropriate antibiotic prophylaxis will be necessary at time of all dental interventions to reduce risk of bacterial endocarditis. Thank you for allowing me to participate in your patient's care. Please feel free to contact me if you have any questions or concerns. Note: This report was completed using computerized voice recognition software. Every effort has been made to ensure accuracy, however; inadvertent computerized transcription errors may be present. Kali Miramontes.  Nish Wilson, [address] [place] [address]

## 2022-07-28 NOTE — PROGRESS NOTES
P Quality Flow/Interdisciplinary Rounds Progress Note        Quality Flow Rounds held on July 28, 2022    Disciplines Attending:  Bedside Nurse, , , and Nursing Unit Leadership    Bob Neir was admitted on 7/27/2022 12:24 PM    Anticipated Discharge Date:       Disposition:    Maximo Score:  Maximo Scale Score: 21    Readmission Risk              Risk of Unplanned Readmission:  [id number]           Discussed patient goal for the day, patient clinical progression, and barriers to discharge. The following Goal(s) of the Day/Commitment(s) have been identified:  Wean oxygen, PT/OT to see, monitor temp.       Anton Diez RN  July 28, 2022

## 2022-07-28 NOTE — PROGRESS NOTES
Zenaidaradha  Family Medicine Attending    S: 67 y.o. female with fever of unknown origin in the setting of valve replacement. Patient with complaints of chills and fevers states that these have been going on for weeks however she presented when her temperature actually elevated. She does report that some of this started after an ambulatory doc initiated nifedipine therapy for elevated blood pressures. She had a troponin checked which was elevated and cardiology was consulted she does not endorse further cardiovascular symptoms however does state that her current malaise feels similar to her prior cardiac issues including valvular and coronary related. Some back pain perhaps a little worse than usual lower cervical upper thoracic region. O: VS- Blood pressure 118/66, pulse 76, temperature 98.6 °F (37 °C), temperature source Oral, resp. rate 18, height 5' 4\" (1.626 m), weight 223 lb 3.2 oz (101.2 kg), SpO2 97 %. Exam is as noted by resident with the following changes, additions or corrections:  Awake alert and oriented no acute distress  Regular rate and rhythm with systolic ejection murmur  Clear to auscultation bilaterally  Soft nontender nondistended obese  Edematous bilaterally  No splinter hemorrhages    Impressions:   Principal Problem:    Fever in adult  Active Problems:    Leukocytosis    Stage 3b chronic kidney disease (HCC)    S/P MVR (mitral valve repair)    PAF (paroxysmal atrial fibrillation) ([facility] [address]    Essential hypertension  Resolved Problems:    * No resolved hospital problems. *      Plan:   Fever unknown origin cultures are pending we will get an echo given her valve replacement and cardiac murmur. We will have radiology reevaluate the cervical discs she reports some pain near C7-T1 region. Overall I have a low suspicion for coronary disease given her current presentation however I need to rule out infection such as endocarditis or discitis given her vague symptoms. We may need to explore lymph node enlargement this has been persistent for some time. Records here to be at [facility]. Attending Physician Statement   I have reviewed the chart with the teaching service, including family medicine documentation dated 7/28/22 and yesterday (if available). Pertinent radiology and/or labs reviewed. I have seen the patient in conjunction with the resident teaching service. I personally reviewed and performed key elements of the history and exam. I agree with the assessment, plan and orders as documented by the resident with any changes noted above. Please refer to the resident note(s) for additional information.     Edwar Tracy MD

## 2022-07-28 NOTE — FLOWSHEET NOTE
Dr. Trudi Gonzalez notified pt shivering, complaining of chills, /115, , temp 100.9. New orders given.  Will continue to monitor

## 2022-07-28 NOTE — PROGRESS NOTES
[facility] [address]  Progress Note      Chief complaint :  Fever, chills      Subjective:  Patient states that she is feeling better today. Had severe chills overnight which she thinks is due to what she was being IV because when IV was removed her symptoms resolved. She states that when she started Nifedipine 2 days ago is what may have led to onset of fever and chills. Denies CP, abdominal pain, N/V, changes in urination, BM. Overnight fever of 102. 6F and /44, received Labetalol 5 mg. Past medical, surgical, family and social history were reviewed, non-contributory, and unchanged unless otherwise stated. ROS negative except as stated above. Objective:  /66   Pulse 76   Temp 98.6 °F (37 °C) (Oral)   Resp 18   Ht 5' 4\" (1.626 m)   Wt 223 lb 3.2 oz (101.2 kg)   SpO2 97%   BMI 38.31 kg/m²     Physical Exam  Constitutional:       Appearance: Normal appearance. She is obese. Comments: 4 L nasal cannula   HENT:      Head: Normocephalic. Nose: Nose normal. No rhinorrhea. Eyes:      General: No scleral icterus. Conjunctiva/sclera: Conjunctivae normal.   Cardiovascular:      Rate and Rhythm: Normal rate. Pulses: Normal pulses. Heart sounds: Normal heart sounds. Pulmonary:      Breath sounds: Normal breath sounds. No wheezing, rhonchi or rales. Abdominal:      General: Abdomen is flat. Bowel sounds are normal.   Musculoskeletal:      Right lower leg: No edema. Left lower leg: No edema. Skin:     General: Skin is warm. Findings: No rash. Neurological:      General: No focal deficit present. Mental Status: She is alert.        Labs:  Recent Results (from the past 24 hour(s))   Lactate, Sepsis    Collection Time: 07/27/22  1:01 PM   Result Value Ref Range    Lactic Acid, Sepsis 0.9 0.5 - 1.9 mmol/L   CBC with Auto Differential    Collection Time: 07/27/22  1:03 PM   Result Value Ref Range    WBC 13.4 (H) 4.5 - 11.5 E9/L Negative mg/dL    Urobilinogen, Urine 0.2 <2.0 E.U./dL    Nitrite, Urine Negative Negative    Leukocyte Esterase, Urine Negative Negative    WBC, UA NONE 0 - 5 /HPF    RBC, UA NONE 0 - 2 /HPF    Epithelial Cells, UA NONE SEEN /HPF    Bacteria, UA NONE SEEN None Seen /HPF   COVID-19, Rapid    Collection Time: 07/27/22  1:03 PM    Specimen: Nasopharyngeal Swab   Result Value Ref Range    SARS-CoV-2, NAAT Not Detected Not Detected   RAPID INFLUENZA A/B ANTIGENS    Collection Time: 07/27/22  1:03 PM    Specimen: Nasopharyngeal   Result Value Ref Range    Influenza A by PCR Not Detected Not Detected    Influenza B by PCR Not Detected Not Detected   EKG 12 Lead    Collection Time: 07/27/22  1:15 PM   Result Value Ref Range    Ventricular Rate 90 BPM    Atrial Rate 96 BPM    P-R Interval 116 ms    QRS Duration 178 ms    Q-T Interval 414 ms    QTc Calculation (Bazett) 506 ms    P Axis -51 degrees    R Axis -90 degrees    T Axis 50 degrees   Troponin    Collection Time: 07/27/22  2:28 PM   Result Value Ref Range    Troponin, High Sensitivity 25 (H) 0 - 9 ng/L   Lipid Panel    Collection Time: 07/27/22  2:28 PM   Result Value Ref Range    Cholesterol, Total 152 0 - 199 mg/dL    Triglycerides 160 (H) 0 - 149 mg/dL    HDL 44 >40 mg/dL    LDL Calculated 76 0 - 99 mg/dL    VLDL Cholesterol Calculated 32 mg/dL   Brain Natriuretic Peptide    Collection Time: 07/27/22 11:15 PM   Result Value Ref Range    Pro- (H) 0 - 125 pg/mL   Ferritin    Collection Time: 07/27/22 11:15 PM   Result Value Ref Range    Ferritin 259 ng/mL   CBC with Auto Differential    Collection Time: 07/28/22  2:35 AM   Result Value Ref Range    WBC 15.8 (H) 4.5 - 11.5 E9/L    RBC 4.50 3.50 - 5.50 E12/L    Hemoglobin 13.7 11.5 - 15.5 g/dL    Hematocrit 42.3 34.0 - 48.0 %    MCV 94.0 80.0 - 99.9 fL    MCH 30.4 26.0 - 35.0 pg    MCHC 32.4 32.0 - 34.5 %    RDW 14.1 11.5 - 15.0 fL    Platelets 541 392 - 654 E9/L    MPV 10.3 7.0 - 12.0 fL    Neutrophils % 87.5 (H) 43.0 - 80.0 %    Immature Granulocytes % 0.4 0.0 - 5.0 %    Lymphocytes % 4.9 (L) 20.0 - 42.0 %    Monocytes % 6.3 2.0 - 12.0 %    Eosinophils % 0.6 0.0 - 6.0 %    Basophils % 0.3 0.0 - 2.0 %    Neutrophils Absolute 13.86 (H) 1.80 - 7.30 E9/L    Immature Granulocytes # 0.06 E9/L    Lymphocytes Absolute 0.78 (L) 1.50 - 4.00 E9/L    Monocytes Absolute 0.99 (H) 0.10 - 0.95 E9/L    Eosinophils Absolute 0.10 0.05 - 0.50 E9/L    Basophils Absolute 0.05 0.00 - 0.20 E9/L   Comprehensive Metabolic Panel w/ Reflex to MG    Collection Time: 07/28/22  2:35 AM   Result Value Ref Range    Sodium 138 132 - 146 mmol/L    Potassium reflex Magnesium 4.5 3.5 - 5.0 mmol/L    Chloride 104 98 - 107 mmol/L    CO2 20 (L) 22 - 29 mmol/L    Anion Gap 14 7 - 16 mmol/L    Glucose 199 (H) 74 - 99 mg/dL    BUN 35 (H) 6 - 23 mg/dL    Creatinine 1.6 (H) 0.5 - 1.0 mg/dL    GFR Non-African American 32 >=60 mL/min/1.73    GFR African American 38     Calcium 8.9 8.6 - 10.2 mg/dL    Total Protein 7.1 6.4 - 8.3 g/dL    Albumin 3.8 3.5 - 5.2 g/dL    Total Bilirubin 0.8 0.0 - 1.2 mg/dL    Alkaline Phosphatase 73 35 - 104 U/L    ALT 28 0 - 32 U/L    AST 31 0 - 31 U/L       Radiology and other tests reviewed:  CT CHEST WO CONTRAST   Final Result   1. Irregular opacities located in the upper lobes likely related to scarring   and atelectasis. 2. No obvious pneumonia or pleural effusion. 3. Mediastinal lymphadenopathy. 4. Noncalcified nodule measuring 5 mm in right upper lobe. Follow-up   recommended as indicated below. RECOMMENDATIONS:   Fleischner Society guidelines for follow-up and management of incidentally   detected pulmonary nodules:      Single Solid Nodule:      Nodule size less than 6 mm   In a low-risk patient, no routine follow-up. In a high-risk patient, optional CT at 12 months. Nodule size equals 6-8 mm   In a low-risk patient, CT at 6-12 months, then consider CT at 18-24 months.    In a high-risk patient, CT at 6-12 1L NS bolus, likely SIRS (WBC count and temp)  CBC daily  Leukocytosis increasing 13.4>15.8  Received IV zosyn once, will hold off antibiotics for now. COVID negative, Flu negative  Continue IVF  cc/hr  Blood cx pending  Urine cx pending  RFA panel pending  ESR, procalcitonin pending  Wean oxygen as tolerated     Nausea   Well controlled  Prolonged Qtc, will start Tigan 200mg q6h prn   EGD 2016 : negative  Monitor for now. A. Fib  Not on anticoagulants  Continue telemetry  Continue Nifedipine 60 mg daily for now  [id number] : 3  Continue Eliquis 5mg BID     HTN  Continue Nifedipine 60 mg daily for now  Labetalol 5 mg Q6H PRN  Monitor daily BMP     CKD stage 3b  Creatinine 1.6 today, baseline is 1.4-1.5  Monitor BMP  Continue IVF  cc/hr    DM  A1C: 6.7 on 06/21, not on any meds at home  Ordered A1C pending  Hypoglycemia protocols  POCT glucose checks     HLD  Continue lipitor 40 mg daily  Lipid panel unremarkable     Coronary artery disease  S/p CABG 2016, ECHO 2016 showed 65% ejection fraction.  Hx of AV replacement x2, mitral valve repair  Follows with [facility]     DVT ppx: Eliquis   GI ppx: None  Code Status: Full  Diet: General diet        Mindy Melendez MD   Family Medicine Resident PGY-2  07/28/22

## 2022-07-28 NOTE — PROGRESS NOTES
Physical Therapy  Facility/Department: [facility]  Physical Therapy Initial Assessment    Name: Sarah Bland  : 1950  MRN: [id number]  Date of Service: 2022      Patient Diagnosis(es): The encounter diagnosis was Fever of unknown origin. Past Medical History:  has a past medical history of A-fib ([facility] [address] ARF (acute renal failure) ([facility] [address] Cancer ([facility] [address] Cardiac dysfunction, Diabetes mellitus ([facility] [address] Diverticulitis, History of deep vein thrombosis, Hx of blood clots, Poor venous access, S/P AVR, S/P IVC filter, Traumatic seroma of left thigh ([facility] [address] and Ventilator dependent ([facility] [address] Past Surgical History:  has a past surgical history that includes Cardiac surgery; Gastrostomy tube placement (2016); thoracentesis (10/14/2016); Colonoscopy; Endoscopy, colon, diagnostic; Aortic valve replacement; and Vena Cava Filter Placement (Right, 10/14/2016). Referring provider:  Ya Haile MD    PT Order:  PT eval and treat     Evaluating PT:  Long Fisher, PT, DPT PT [id number]    Room #:  7237/5514-P  Diagnosis:  Fever in adult [R50.9]  Precautions:  O2  Equipment Needs:  none    SUBJECTIVE:    Pt lives with her  in a 1 story home with 3-4 stairs to enter and 1 rail. Bed and bath is on first floor. Pt ambulated with no device PTA. OBJECTIVE:   Initial Evaluation  Date:    Was pt agreeable to Eval/treatment? yes   Does pt have pain? Back pain   Bed Mobility  Rolling: independent  Supine to sit: independent  Sit to supine: NT  Scooting: independent   Transfers Sit to stand: independent  Stand to sit: independent  Stand pivot: NT   Ambulation    15 feet x 2 and 30 feet without device independent.      Stair negotiation: ascended and descended  NT    ROM BLE:  WFL   Strength BLE:  grossly 4+/5   Balance Sitting EOB:  independent  Dynamic Standing:  independent   AM-PAC 6 Clicks 30/65     Pt is A & O x 3  Sensation:  Pt denies numbness and tingling to extremities    Patient education  Pt educated on PT objectives during eval and while in the hospital    Patient response to education:   Pt verbalized understanding Pt demonstrated skill Pt requires further education in this area       no     ASSESSMENT:    Conditions Requiring Skilled Therapeutic Intervention:    []Decreased strength     []Decreased ROM  []Decreased functional mobility  []Decreased balance   []Decreased endurance   []Decreased posture  []Decreased sensation  []Decreased coordination   []Decreased vision  []Decreased safety awareness   []Increased pain       Comments:  Pt found in bed. Pt reported feeling dizzy once sitting EOB which decreased with seated rest.  No LOB during ambulation. No report of SOB during mobility. At end of eval, pt left sitting on the EOB with call light in reach. Pt will not be picked up for PT services at this time due to independent functional mobility level. Pt's/ family goals   1. None stated    Prognosis is good for reaching above PT goals. Patient and or family understand(s) diagnosis, prognosis, and plan of care. yes    PHYSICAL THERAPY PLAN OF CARE:    PT POC is established based on physician order and patient diagnosis     Diagnosis:  Fever in adult [R50.9]    Pt will not be picked up for PT services at this time due to independent functional mobility level. Time in  1325  Time out  1337      Evaluation Time includes thorough review of current medical information, gathering information on past medical history/social history and prior level of function, completion of standardized testing/informal observation of tasks, assessment of data and education on plan of care and goals.     CPT codes:  [x] Low Complexity PT evaluation 51574  [] Moderate Complexity PT evaluation 76412  [] High Complexity PT evaluation 20009  [] PT Re-evaluation 63598  [] Therapeutic activities 10124 __minutes  [] Therapeutic exercises 51475 __ minutes      Jose Andrade, PT, DPT  PT [id number]

## 2022-07-28 NOTE — PROGRESS NOTES
Occupational Therapy  Date:7/28/2022  Patient Name: Junito Crystal  Room: 273/3823-O     Occupational Therapy (OT) order received, patient's medical record reviewed, and OT evaluation attempted this afternoon; patient reported that she is independent with ADLs and functional transfers/mobility and declined provision of OT services during this hospitalization. Patient noted that family can assist with IADLs, as needed, and denied need for DME upon discharge. No OT evaluation completed, per patient preference. Mary Swan, OTR/L  License Number: [id number]

## 2022-07-28 NOTE — CONSULTS
[facility] Nephrology Consult       Patient's Name:  Danny Camarena  Date of Service:  7/28/2022  Requesting    José Miguel Garcia DO    Reason for Consult:               Elevated BUN/Cr     Chief Complaint:                      Unwell , fever     Information obtained from:     patient , chart     History of Present Illness: 67 yrs old WF     Known CAD , A fib , Type 2 DM , AVR , Valvular heart disease and lymphoma   CKD 3 A , Baseline cr at 1.3--1.5 on the basis of HTN , DM , secondary to Microvascular   Disease     Came in febrile ( T 101.3 on arrival ) , work up suggested renal functions at baseline   High troponin , high ck , leucocytosis , viral infection screen all negative , urine +ve for protein , CT chest Mediastinal lymph nodes , CT abdo , uterine fibroid , diverticula and   Non obstructing renal stones , negative covid , and influenza ,     She feels better now ,fever subsided , vitals all stable , offers no complains    Past Medical History:   Diagnosis Date    A-fib ([address]     ARF (acute renal failure) ([address]     Cancer ([address]     lymphoma    Cardiac dysfunction     Diabetes mellitus ([address]     Diverticulitis     History of deep vein thrombosis 11/3/2016    Hx of blood clots     Poor venous access 11/4/2016    S/P AVR     S/P IVC filter 11/3/2016    Traumatic seroma of left thigh ([address] 11/16/2016    Ventilator dependent ([facility] [address]          Past Surgical History:   Procedure Laterality Date    AORTIC VALVE REPLACEMENT      CARDIAC SURGERY      \"homograph\" valve replacement    COLONOSCOPY      ENDOSCOPY, COLON, DIAGNOSTIC      GASTROSTOMY TUBE PLACEMENT  09/26/2016    THORACENTESIS  10/14/2016    VENA CAVA FILTER PLACEMENT Right 10/14/2016         Social History     Socioeconomic History    Marital status:      Spouse name: Not on file    Number of children: Not on file    Years of education: Not on file    Highest education level: Not on file   Occupational History    Not on file   Tobacco Use Smoking status: Never    Smokeless tobacco: Never   Substance and Sexual Activity    Alcohol use: No    Drug use: No    Sexual activity: Not on file   Other Topics Concern    Not on file   Social History Narrative    Not on file     Social Determinants of Health     Financial Resource Strain: Not on file   Food Insecurity: Not on file   Transportation Needs: Not on file   Physical Activity: Not on file   Stress: Not on file   Social Connections: Not on file   Intimate Partner Violence: Not on file   Housing Stability: Not on file       Family History  No family history on file. Scheduled Meds:   pantoprazole (PROTONIX) 40 mg injection  40 mg IntraVENous Daily    furosemide  40 mg Oral Daily    atorvastatin  40 mg Oral Nightly    sodium chloride flush  5-40 mL IntraVENous 2 times per day    [Held by provider] apixaban  5 mg Oral BID       Continuous Infusions:  [unfilled]    PRN meds  sodium chloride flush, sodium chloride, polyethylene glycol, acetaminophen **OR** acetaminophen, trimethobenzamide, labetalol    Allergies:  Protamine, Ciprofloxacin, Hydrocodone-acetaminophen, Oxycodone-acetaminophen, and Vancomycin    Review of Systems     Pertinent positives and negatives as in HPI. Other systems reviewed and were negative.      LAST 3 CMP  Recent Labs     07/27/22  1303 07/28/22  0235    138   K 4.4 4.5    104   CO2 26 20*   BUN 33* 35*   CREATININE 1.4* 1.6*   GLUCOSE 127* 199*   CALCIUM 9.6 8.9   PROT 7.6 7.1   LABALBU 4.2 3.8   BILITOT 0.4 0.8   ALKPHOS 72 73   AST 16 31       LAST 3 CBC:  Recent Labs     07/27/22  1303 07/28/22  0235   WBC 13.4* 15.8*   RBC 4.85 4.50   HGB 14.9 13.7   HCT 45.1 42.3    173       No intake or output data in the 24 hours ending 07/28/22 1401  Patient Vitals for the past 24 hrs:   BP Temp Temp src Pulse Resp SpO2 Weight   07/28/22 0734 118/66 98.6 °F (37 °C) Oral 76 18 97 % --   07/28/22 0404 (!) 116/56 98.6 °F (37 °C) Oral 86 18 95 % 223 lb 3.2 oz (101.2 kg)   07/28/22 0015 (!) 106/49 98.4 °F (36.9 °C) Oral 90 18 -- --   07/27/22 2300 (!) 111/49 (!) 102.6 °F (39.2 °C) Oral 82 20 94 % --   07/27/22 2245 (!) 178/44 (!) 102 °F (38.9 °C) Oral (!) 112 24 -- --   07/27/22 1930 129/71 99.4 °F (37.4 °C) Oral 88 18 98 % --   07/27/22 1757 130/60 99.2 °F (37.3 °C) Oral 81 16 96 % --       General appearance:  Appears stated age  Skin: color, texture, turgor normal. No rashes or lesions. Neck: supple, no masses, no JVD, No carotid bruits, No thyromegaly  Lungs: respirations unlabored. Clear to auscultation. No rales, wheezes, no rhonchi. Equal chest excursion with respirations. Heart RRR. pmi not laterally displaced. No S3 or S4, no rub  Abdomen:  Soft, ND, NT. Bowel sounds present. No HSM. No epigastric bruit, no increased Ao pulsation,  Extremities: warm to touch. No LE edema or cyanosis. No fem bruit. 2+ upstrokes and equal bilaterally. PT/Pedal 2+ equal bilat  Neuro: Cr N 2-12 grossly intact. No focal motor neuro deficit. No alteration in recent remote memory.     Assessment/Plan    1) Known CKD stage 3B ,renal functions at baseline , CKD on the basis of HTN and DM        With evident protein in urine - microvascular disease related         CT scan ruled out any obstruction     2) Fever - subsided , Blood c/s results awaited , viral screen so far negative , no obvoius s/o        Active infection     3) Valvular heart disease , A fib , on eliquis ,CAD , Cardiology comments noted     4)  HTN controlled     5) Type 2 DM fair control of sugars     6) H/o Lymphoma     Check urine pr/cr ratio to quantify amount of proteinuria  PTH , Vit D            Thank you for allowing us to participate in the care of Анна Snider MD  7/28/2022  2:01 PM

## 2022-07-29 VITALS
DIASTOLIC BLOOD PRESSURE: 68 MMHG | OXYGEN SATURATION: 91 % | HEART RATE: 69 BPM | HEIGHT: 64 IN | BODY MASS INDEX: 38.1 KG/M2 | SYSTOLIC BLOOD PRESSURE: 115 MMHG | RESPIRATION RATE: 18 BRPM | WEIGHT: 223.2 LBS | TEMPERATURE: 99.3 F

## 2022-07-29 PROBLEM — R50.9 FEVER OF UNKNOWN ORIGIN: Status: RESOLVED | Noted: 2022-07-27 | Resolved: 2022-07-29

## 2022-07-29 PROBLEM — D72.829 LEUKOCYTOSIS: Status: RESOLVED | Noted: 2022-07-27 | Resolved: 2022-07-29

## 2022-07-29 LAB
ALBUMIN SERPL-MCNC: 3.6 G/DL (ref 3.5–5.2)
ALP BLD-CCNC: 80 U/L (ref 35–104)
ALT SERPL-CCNC: 32 U/L (ref 0–32)
ANION GAP SERPL CALCULATED.3IONS-SCNC: 10 MMOL/L (ref 7–16)
AST SERPL-CCNC: 41 U/L (ref 0–31)
BASOPHILS ABSOLUTE: 0.02 E9/L (ref 0–0.2)
BASOPHILS RELATIVE PERCENT: 0.2 % (ref 0–2)
BILIRUB SERPL-MCNC: 0.4 MG/DL (ref 0–1.2)
BUN BLDV-MCNC: 31 MG/DL (ref 6–23)
CALCIUM SERPL-MCNC: 9.1 MG/DL (ref 8.6–10.2)
CHLORIDE BLD-SCNC: 104 MMOL/L (ref 98–107)
CO2: 24 MMOL/L (ref 22–29)
CREAT SERPL-MCNC: 1.7 MG/DL (ref 0.5–1)
EKG ATRIAL RATE: 84 BPM
EKG P-R INTERVAL: 148 MS
EKG Q-T INTERVAL: 424 MS
EKG QRS DURATION: 180 MS
EKG QTC CALCULATION (BAZETT): 501 MS
EKG R AXIS: -58 DEGREES
EKG T AXIS: 45 DEGREES
EKG VENTRICULAR RATE: 84 BPM
EOSINOPHILS ABSOLUTE: 0.24 E9/L (ref 0.05–0.5)
EOSINOPHILS RELATIVE PERCENT: 2.5 % (ref 0–6)
GFR AFRICAN AMERICAN: 36
GFR NON-AFRICAN AMERICAN: 30 ML/MIN/1.73
GLUCOSE BLD-MCNC: 153 MG/DL (ref 74–99)
HCT VFR BLD CALC: 38.8 % (ref 34–48)
HEMOGLOBIN: 12.5 G/DL (ref 11.5–15.5)
IMMATURE GRANULOCYTES #: 0.06 E9/L
IMMATURE GRANULOCYTES %: 0.6 % (ref 0–5)
LYMPHOCYTES ABSOLUTE: 0.93 E9/L (ref 1.5–4)
LYMPHOCYTES RELATIVE PERCENT: 9.7 % (ref 20–42)
MCH RBC QN AUTO: 30.2 PG (ref 26–35)
MCHC RBC AUTO-ENTMCNC: 32.2 % (ref 32–34.5)
MCV RBC AUTO: 93.7 FL (ref 80–99.9)
MONOCYTES ABSOLUTE: 0.6 E9/L (ref 0.1–0.95)
MONOCYTES RELATIVE PERCENT: 6.3 % (ref 2–12)
NEUTROPHILS ABSOLUTE: 7.7 E9/L (ref 1.8–7.3)
NEUTROPHILS RELATIVE PERCENT: 80.7 % (ref 43–80)
PDW BLD-RTO: 13.9 FL (ref 11.5–15)
PLATELET # BLD: 168 E9/L (ref 130–450)
PMV BLD AUTO: 10.7 FL (ref 7–12)
POTASSIUM REFLEX MAGNESIUM: 4.6 MMOL/L (ref 3.5–5)
RBC # BLD: 4.14 E12/L (ref 3.5–5.5)
SODIUM BLD-SCNC: 138 MMOL/L (ref 132–146)
TOTAL CK: 122 U/L (ref 20–180)
TOTAL PROTEIN: 6.8 G/DL (ref 6.4–8.3)
TROPONIN, HIGH SENSITIVITY: 75 NG/L (ref 0–9)
TROPONIN, HIGH SENSITIVITY: 76 NG/L (ref 0–9)
WBC # BLD: 9.6 E9/L (ref 4.5–11.5)

## 2022-07-29 PROCEDURE — 6370000000 HC RX 637 (ALT 250 FOR IP)

## 2022-07-29 PROCEDURE — 2700000000 HC OXYGEN THERAPY PER DAY

## 2022-07-29 PROCEDURE — 93005 ELECTROCARDIOGRAM TRACING: CPT | Performed by: FAMILY MEDICINE

## 2022-07-29 PROCEDURE — 93308 TTE F-UP OR LMTD: CPT

## 2022-07-29 PROCEDURE — 6370000000 HC RX 637 (ALT 250 FOR IP): Performed by: INTERNAL MEDICINE

## 2022-07-29 PROCEDURE — 85025 COMPLETE CBC W/AUTO DIFF WBC: CPT

## 2022-07-29 PROCEDURE — 36415 COLL VENOUS BLD VENIPUNCTURE: CPT

## 2022-07-29 PROCEDURE — 99233 SBSQ HOSP IP/OBS HIGH 50: CPT | Performed by: INTERNAL MEDICINE

## 2022-07-29 PROCEDURE — 82550 ASSAY OF CK (CPK): CPT

## 2022-07-29 PROCEDURE — 84484 ASSAY OF TROPONIN QUANT: CPT

## 2022-07-29 PROCEDURE — 99239 HOSP IP/OBS DSCHRG MGMT >30: CPT | Performed by: FAMILY MEDICINE

## 2022-07-29 PROCEDURE — 2580000003 HC RX 258: Performed by: FAMILY MEDICINE

## 2022-07-29 PROCEDURE — 80053 COMPREHEN METABOLIC PANEL: CPT

## 2022-07-29 PROCEDURE — 6370000000 HC RX 637 (ALT 250 FOR IP): Performed by: FAMILY MEDICINE

## 2022-07-29 PROCEDURE — 6360000002 HC RX W HCPCS

## 2022-07-29 RX ORDER — ERGOCALCIFEROL (VITAMIN D2) 200 MCG/ML
4000 DROPS ORAL DAILY
Status: DISCONTINUED | OUTPATIENT
Start: 2022-07-29 | End: 2022-07-29 | Stop reason: HOSPADM

## 2022-07-29 RX ORDER — ERGOCALCIFEROL (VITAMIN D2) 200 MCG/ML
4000 DROPS ORAL DAILY
Qty: 10 ML | Refills: 0 | Status: SHIPPED | OUTPATIENT
Start: 2022-07-30

## 2022-07-29 RX ORDER — ENOXAPARIN SODIUM 100 MG/ML
30 INJECTION SUBCUTANEOUS 2 TIMES DAILY
Status: DISCONTINUED | OUTPATIENT
Start: 2022-07-29 | End: 2022-07-29 | Stop reason: HOSPADM

## 2022-07-29 RX ORDER — AMOXICILLIN AND CLAVULANATE POTASSIUM 875; 125 MG/1; MG/1
1 TABLET, FILM COATED ORAL EVERY 12 HOURS SCHEDULED
Status: DISCONTINUED | OUTPATIENT
Start: 2022-07-29 | End: 2022-07-29 | Stop reason: HOSPADM

## 2022-07-29 RX ORDER — PANTOPRAZOLE SODIUM 40 MG/1
40 TABLET, DELAYED RELEASE ORAL
Status: DISCONTINUED | OUTPATIENT
Start: 2022-07-29 | End: 2022-07-29 | Stop reason: HOSPADM

## 2022-07-29 RX ORDER — AMOXICILLIN AND CLAVULANATE POTASSIUM 875; 125 MG/1; MG/1
1 TABLET, FILM COATED ORAL EVERY 12 HOURS SCHEDULED
Qty: 20 TABLET | Refills: 0 | Status: SHIPPED | OUTPATIENT
Start: 2022-07-29 | End: 2022-08-08

## 2022-07-29 RX ADMIN — ACETAMINOPHEN 650 MG: 325 TABLET ORAL at 12:49

## 2022-07-29 RX ADMIN — Medication 4000 UNITS: at 16:24

## 2022-07-29 RX ADMIN — ENOXAPARIN SODIUM 30 MG: 100 INJECTION SUBCUTANEOUS at 13:41

## 2022-07-29 RX ADMIN — Medication 10 ML: at 09:08

## 2022-07-29 RX ADMIN — PANTOPRAZOLE SODIUM 40 MG: 40 TABLET, DELAYED RELEASE ORAL at 09:08

## 2022-07-29 RX ADMIN — FUROSEMIDE 40 MG: 40 TABLET ORAL at 09:08

## 2022-07-29 NOTE — PROGRESS NOTES
Spoke with Microbiology lab at [facility] who stated that blood cultures x2 are negative for growth at 48 hours. Not updated in system yet.      Ross Hussein MD  7/29/22

## 2022-07-29 NOTE — DISCHARGE SUMMARY
Physician Discharge Summary  [id number] [facility] Medicine Residency     Patient ID:  Caden Wilder  [id number]  22 y.o.  1950    Admit date: 7/27/2022    Discharge date: 7/29/2022    Admission Diagnoses:   Fever in adult [R50.9]    Discharge Diagnoses:  Principal Problem (Resolved):    Fever of unknown origin  Active Problems:    Stage 3b chronic kidney disease (HCC)    S/P MVR (mitral valve repair)    PAF (paroxysmal atrial fibrillation) ([address]    Essential hypertension  Resolved Problems:    Leukocytosis      Consults: Cardiology, Nephrology  Procedures: None    Hospital Course: Caden Wilder is a 67 y.o. female with a pertinent PMHx of CAD s/p CABG, aortic and mitral valve repair, HTN, hx of DVT, GI bleed, interstitial fibrosis, ?lymphoma, who presented with chills, nausea, fatigue and was admitted for fever of unknown origin. Patient was SIRS criteria positive at time of admission. Received Zosyn. Echo was conducted which ruled out endocarditis. Blood and urine cultures were negative. Cardiology was consulted as patient stated that this is a similar presentation to her past before she required CABG. Patient had 1 episode of self-resolving chest pain during admission with symptoms which were different from hx of panic attacks. Cardiology did not recommend further interventions. Was started on Eliquis for atrial fibrillation but found to have only one event of atrial fibrillation in the past therefore not recommended by cardiology to continue. Due to patient's report of pain at cervical discs radiology reevaluated imaging and ruled out discitis, osteodiscitis, phlegmon, abscess. Imaging did reveal lymphadenopathy and patient has questionable past history lymphoma, however patient later revealed that she had symptoms of sinusitis that had been present a week prior to admission. Received Tigan for nausea due to prolonged QTc.  Patient presented with significant hypertension and proteinuria therefore nephrology was consulted for further management. Found to have secondary hyperparathyroidism with vitamin D deficiency. Patient was started on vitamin D supplementation. Fevers thought to be due to nifedipine by the patient therefore this medication was discontinued during admission. Home medications were continued during admission. Patient was discharged in a stable and improved condition. Post Discharge Follow up: PCP, Cardiology at [facility]    Medication changes: Start Augmentin 875-125 BID x 10 days, Vitamin D supplementation. Nefedipine D/C. Will defer alternative treatment to PCP    Discharge Exam:   Physical Exam  Vitals and nursing note reviewed. Constitutional:       General: She is not in acute distress. Appearance: Normal appearance. She is obese. HENT:      Head: Normocephalic and atraumatic. Eyes:      General:         Right eye: No discharge. Left eye: No discharge. Cardiovascular:      Rate and Rhythm: Normal rate and regular rhythm. Heart sounds: No murmur heard. Comments: Regular rate regular rhythm    Pulmonary:      Effort: Pulmonary effort is normal.      Breath sounds: No wheezing. Abdominal:      General: Bowel sounds are normal.      Palpations: Abdomen is soft. Tenderness: There is no abdominal tenderness. Musculoskeletal:         General: No swelling. Normal range of motion. Right lower leg: No edema. Left lower leg: No edema. Skin:     General: Skin is warm and dry. Neurological:      General: No focal deficit present. Mental Status: She is oriented to person, place, and time. Disposition: Home  Patient condition: Good    Patient Instructions:    Activity: activity as tolerated  Diet: diabetic diet, cardiac diet    Discharge Medication List:     Medication List        START taking these medications      amoxicillin-clavulanate 875-125 MG per tablet  Commonly known as: AUGMENTIN  Take 1 tablet by mouth in the morning and 1 tablet before bedtime. Do all this for 20 doses. Ergocalciferol 200 MCG/ML drops  Commonly known as: Drisdol  Take 0.5 mLs by mouth in the morning. CONTINUE taking these medications      albuterol sulfate  (90 Base) MCG/ACT inhaler  Commonly known as: PROVENTIL;VENTOLIN;PROAIR     atorvastatin 40 MG tablet  Commonly known as: LIPITOR     furosemide 40 MG tablet  Commonly known as: LASIX     OXYGEN     polyvinyl alcohol 1.4 % ophthalmic solution  Commonly known as: LIQUIFILM TEARS     therapeutic multivitamin-minerals tablet            STOP taking these medications      NIFEdipine 60 MG extended release tablet  Commonly known as: PROCARDIA XL               Where to Get Your Medications        [facility] [address] [place] [id number]      Phone: [phone]   amoxicillin-clavulanate 875-125 MG per tablet  Ergocalciferol 200 MCG/ML drops           Follow up:   Rafael Flynn MD  [facility] [address] [id number] [phone]    Schedule an appointment as soon as possible for a visit in 1 week(s)  Hospital Follow Up    Radha Francisco MD  [address], [address]  [address] [facility]  [phone]    Follow up in 2 week(s)        Migue Vásquez MD  Family Medicine Resident PGY-2  7/31/2022

## 2022-07-29 NOTE — PROGRESS NOTES
INPATIENT CARDIOLOGY FOLLOW-UP    Name: Brett Gonzalez    Age: 67 y.o. Date of Admission: 7/27/2022 12:24 PM    Date of Service: 7/29/2022    Chief Complaint: Follow-up for viral illness, abnormal troponin, coronary atherosclerosis, aortic valve disease, mitral valve disease, aortic valve disease, hypertension, chronic kidney disease, moderate obesity    Interim History: The patient relates no active cardiovascular symptoms with a persistent cough and sputum production suggestive of an upper respiratory tract infection in addition to that of ongoing low-grade fevers. Her additional laboratory assessment demonstrates a decreasing trend from her single significantly elevated high-sensitivity troponin level and mild elevation of CK levels not consistent with significant myocardial injury. She otherwise remains hemodynamically stable with no documented atrial arrhythmias. Review of Systems: The remainder of a complete multisystem review including consitutional, central nervous, respiratory, circulatory, gastrointestinal, genitourinary, endocrinologic, hematologic, musculoskeletal and psychiatric are negative. Problem List:  Patient Active Problem List   Diagnosis    S/P AVR    S/P MVR (mitral valve repair)    Hypervolemia    PAF (paroxysmal atrial fibrillation) ([facility])    A-fib ([facility])    Essential hypertension    Fever of unknown origin    Leukocytosis    Stage 3b chronic kidney disease ([facility] [address]       Allergies: Allergies   Allergen Reactions    Protamine Anaphylaxis and Swelling     STAGE 3, ORGAN FAILURE    Ciprofloxacin Nausea And Vomiting and Other (See Comments)     Patient states \"it makes me very ill. \"    Hydrocodone-Acetaminophen Other (See Comments)     PATIENT STATES IT DOESN'T WORK    Oxycodone-Acetaminophen Other (See Comments)     PATIENT STATES IT DOESN'T WORK    Vancomycin Other (See Comments)     PATIENT STATES IT DOESN'T WORK       Current Medications:  Current Facility-Administered Medications   Medication Dose Route Frequency Provider Last Rate Last Admin    pantoprazole (PROTONIX) tablet 40 mg  40 mg Oral QAM AC Payton Kyle MD        furosemide (LASIX) tablet 40 mg  40 mg Oral Daily Ryan Murdock MD   40 mg at 07/28/22 1106    perflutren lipid microspheres (DEFINITY) injection 1.65 mg  1.5 mL IntraVENous ONCE PRN Kita Carrillo MD        atorvastatin (LIPITOR) tablet 40 mg  40 mg Oral Nightly Luiz Hagan MD   40 mg at 07/28/22 2051    sodium chloride flush 0.9 % injection 5-40 mL  5-40 mL IntraVENous 2 times per day Luiz Hagan MD   10 mL at 07/28/22 2316    sodium chloride flush 0.9 % injection 5-40 mL  5-40 mL IntraVENous PRN Luiz Hagan MD        0.9 % sodium chloride infusion   IntraVENous PRN Luiz Hagan MD        polyethylene glycol (GLYCOLAX) packet 17 g  17 g Oral Daily PRN Luiz Hagan MD        acetaminophen (TYLENOL) tablet 650 mg  650 mg Oral Q6H PRN Luiz Hagan MD   650 mg at 07/27/22 2038    Or    acetaminophen (TYLENOL) suppository 650 mg  650 mg Rectal Q6H PRN Luiz Hagan MD        [Held by provider] apixaban (ELIQUIS) tablet 5 mg  5 mg Oral BID Luiz Hagan MD   5 mg at 07/28/22 9061    trimethobenzamide (TIGAN) injection 200 mg  200 mg IntraMUSCular Q6H PRN Luiz Hagan MD   200 mg at 07/27/22 2249    labetalol (NORMODYNE;TRANDATE) injection 5 mg  5 mg IntraVENous Q6H PRN Luiz Hagan MD         Facility-Administered Medications Ordered in Other Encounters   Medication Dose Route Frequency Provider Last Rate Last Admin    morphine (PF) injection 2 mg  2 mg IntraVENous Q5 Min PRN Santiago Segal DO          sodium chloride         Physical Exam:  BP (!) 137/57   Pulse 84   Temp 99.9 °F (37.7 °C) (Oral)   Resp 18   Ht 5' 4\" (1.626 m)   Wt 223 lb 3.2 oz (101.2 kg)   SpO2 94%   BMI 38.31 kg/m²   Weight change:    Wt Readings from Last 3 Encounters:   07/28/22 223 lb 3.2 oz (101.2 kg)   01/19/22 220 lb (99.8 kg)   12/11/21 220 lb (99.8 kg)     The patient is awake, alert and in no discomfort or distress. No gross musculoskeletal deformity is present. No significant skin or nail changes are present. Gross examination of head, eyes, nose and throat are negative. Jugular venous pressure is normal and no carotid bruits are present. Normal respiratory effort is noted with no accessory muscle usage present. Lung fields straight mild bronchospasm to ascultation. Cardiac examination is notable for a regular rate and rhythm with no palpable thrill. No gallop rhythm or cardiac murmur are identified. A benign abdominal examination is present with the exception of obesity and no masses or organomegaly. Intact pulses are present throughout all extremities and no peripheral edema is present. No focal neurologic deficits are present. Intake/Output:  No intake or output data in the 24 hours ending 07/29/22 0842  No intake/output data recorded.     Laboratory Tests:  Lab Results   Component Value Date    CREATININE 1.6 (H) 07/28/2022    BUN 35 (H) 07/28/2022     07/28/2022    K 4.5 07/28/2022     07/28/2022    CO2 20 (L) 07/28/2022     Recent Labs     07/28/22  0825   CKTOTAL 204*     No results found for: BNP  Lab Results   Component Value Date/Time    WBC 15.8 07/28/2022 02:35 AM    RBC 4.50 07/28/2022 02:35 AM    HGB 13.7 07/28/2022 02:35 AM    HCT 42.3 07/28/2022 02:35 AM    MCV 94.0 07/28/2022 02:35 AM    MCH 30.4 07/28/2022 02:35 AM    MCHC 32.4 07/28/2022 02:35 AM    RDW 14.1 07/28/2022 02:35 AM     07/28/2022 02:35 AM    MPV 10.3 07/28/2022 02:35 AM     Recent Labs     07/27/22  1303 07/28/22  0235   ALKPHOS 72 73   ALT 17 28   AST 16 31   PROT 7.6 7.1   BILITOT 0.4 0.8   LABALBU 4.2 3.8     Lab Results   Component Value Date/Time    MG 1.7 10/31/2016 04:45 AM     Lab Results   Component Value Date/Time    PROTIME 33.8 12/31/2016 01:55 PM    INR 2.9 12/31/2016 01:55 PM     Lab Results   Component Value Date/Time    TSH 2.940 02/08/2018 12:00 PM     No components found for: CHLPL  Lab Results   Component Value Date    TRIG 160 (H) 07/27/2022    TRIG 383 (H) 01/22/2019    TRIG 229 (H) 02/08/2018     Lab Results   Component Value Date    HDL 44 07/27/2022    HDL 38 01/22/2019    HDL 33 02/08/2018     Lab Results   Component Value Date    LDLCALC 76 07/27/2022    LDLCALC 113 (H) 01/22/2019    LDLCALC 130 (H) 02/08/2018       Cardiac Tests:  Telemetry findings reviewed: sinus rhythm, no new tachy/bradyarrhythmias overnight      ASSESSMENT / PLAN: On a clinical basis, the patient remains compensated from a cardiovascular standpoint with no active cardiovascular symptoms in the face of her persistent febrile illness and present productive cough consistent with that of an upper respiratory tract infection despite her relatively unremarkable procalcitonin level and the absence of a documented viral infection. Additional management of these components will be deferred entirely to primary care. Her follow-up troponin level is relatively decreased from that noted at peak albeit elevated from that of her baseline with associated CK levels not indicative of significant myocardial injury. Additional follow-up levels both will be obtained and at the recommendation of primary care, a limited echocardiogram will be obtained to assess the presence or absence of regional wall motion abnormalities. Unless significant abnormalities are identified echocardiographically, no additional cardiovascular assessment will presently be warranted. In the absence of documented atrial arrhythmias during hospitalization, at present no documentation beyond that of her reports which presently cannot be substantiated of atrial arrhythmias during her previous hospitalization while in [place] have been present to account for present indications of anticoagulation.   As previously outlined, appropriate lifestyle modification to achieve weight reduction will benefit diastolic cardiac performance as well as assist in reducing her risk of the development of obstructive sleep apnea with associated adverse cardiovascular effects including an increased risk of atrial arrhythmias and recommendations of a formal sleep assessment the following discharge will remain advisable to assist management appropriate. Ongoing aggressive risk factor modification of blood pressure and serum lipids will remain essential to reducing risk of future atherosclerotic development in addition to that of antibiotic prophylaxis time of all dental interventions to reduce risk of bacterial endocarditis. Unless dictated by her echocardiographic findings, we will further evaluate her during hospitalization should additional cardiovascular difficulties or concerns arise with subsequent follow-up with her primary cardiologist at the [facility]. Note: This report was completed utilizing computer voice recognition software. Every effort has been made to ensure accuracy, however; inadvertent computerized transcription errors may be present. Milana Garrison. Malu Lee, [facility]       ADDENDUM: Additional laboratory studies reviewed with continuing trend of decreasing high-sensitivity troponin levels present with normal CK level and remaining inconsistent with that of significant myocardial injury. Her echocardiogram presently demonstrates significant left ventricular hypertrophy with abnormal septal motion consistent with that of prior cardiac surgery with no additional regional wall motion abnormalities and normal left ventricular systolic function with left ventricular diastolic component unable to be evaluated. Left atrial enlargement is present with changes of her mitral valve consistent with that of her prior repair and no additional significant abnormalities as well as suboptimal visualization in the absence of significant abnormalities of her aortic bioprosthesis.   On this basis, the significance of her abnormal high-sensitivity troponin levels remains minimal and not indicative of an acute ischemic event. A trend of increasing serum creatinine levels is noted with need of careful monitoring in addition to that of her volume status with further management deferred to primary care. As outlined we will further evaluate her during hospitalization should additional cardiovascular difficulties or concerns arise with additional cardiovascular care deferred to her cardiologist at the [facility].

## 2022-07-29 NOTE — PROGRESS NOTES
P Quality Flow/Interdisciplinary Rounds Progress Note        Quality Flow Rounds held on July 29, 2022    Disciplines Attending:  Bedside Nurse, , , and Nursing Unit Leadership    Sydnie Abdi was admitted on 7/27/2022 12:24 PM    Anticipated Discharge Date:       Disposition:    Maximo Score:  Maximo Scale Score: 21    Readmission Risk              Risk of Unplanned Readmission:  [id number]           Discussed patient goal for the day, patient clinical progression, and barriers to discharge. The following Goal(s) of the Day/Commitment(s) have been identified:  Back to baseline oxygen, monitor temps-potential discharge.       Lionel Maldonado RN  July 29, 2022

## 2022-07-29 NOTE — PROGRESS NOTES
Progress Note  7/29/2022 11:23 AM  Subjective:   Admit Date: 7/27/2022  PCP: Nanette Garcia,   Interval History: patient examined doing ok , in no distress     Diet: ADULT DIET; Regular    Data:   Scheduled Meds:   pantoprazole  40 mg Oral QAM AC    furosemide  40 mg Oral Daily    atorvastatin  40 mg Oral Nightly    sodium chloride flush  5-40 mL IntraVENous 2 times per day     Continuous Infusions:   sodium chloride       PRN Meds:perflutren lipid microspheres, sodium chloride flush, sodium chloride, polyethylene glycol, acetaminophen **OR** acetaminophen, trimethobenzamide, labetalol  No intake/output data recorded. No intake/output data recorded. No intake or output data in the 24 hours ending 07/29/22 1123  CBC:   Recent Labs     07/27/22  1303 07/28/22  0235 07/29/22  0910   WBC 13.4* 15.8* 9.6   HGB 14.9 13.7 12.5    173 168     BMP:    Recent Labs     07/27/22  1303 07/28/22  0235 07/29/22  0910    138 138   K 4.4 4.5 4.6    104 104   CO2 26 20* 24   BUN 33* 35* 31*   CREATININE 1.4* 1.6* 1.7*   GLUCOSE 127* 199* 153*     Hepatic:   Recent Labs     07/27/22  1303 07/28/22  0235 07/29/22  0910   AST 16 31 41*   ALT 17 28 32   BILITOT 0.4 0.8 0.4   ALKPHOS 72 73 80     Troponin: No results for input(s): TROPONINI in the last 72 hours. BNP: No results for input(s): BNP in the last 72 hours. Lipids:   Recent Labs     07/27/22  1428   CHOL 152   HDL 44     ABGs: No results found for: PHART, PO2ART, [id number]  INR: No results for input(s): INR in the last 72 hours. -----------------------------------------------------------------  RAD: CT ABDOMEN PELVIS WO CONTRAST Additional Contrast? None    Result Date: 7/27/2022  EXAMINATION: CT OF THE ABDOMEN AND PELVIS WITHOUT CONTRAST 7/27/2022 4:15 pm TECHNIQUE: CT of the abdomen and pelvis was performed without the administration of intravenous contrast. Multiplanar reformatted images are provided for review.  Automated exposure control, iterative reconstruction, and/or weight based adjustment of the mA/kV was utilized to reduce the radiation dose to as low as reasonably achievable. COMPARISON: May 30, 2020 HISTORY: ORDERING SYSTEM PROVIDED HISTORY: Fever TECHNOLOGIST PROVIDED HISTORY: Reason for exam:->Fever Additional Contrast?->None Decision Support Exception - unselect if not a suspected or confirmed emergency medical condition->Emergency Medical Condition (MA) FINDINGS: No bowel obstruction, free air, or free fluid. Numerous diverticula involving left colon, cecum, and sigmoid colon. No evidence of acute diverticulitis. Stable mass along the right aspect of the uterus measures 3 x 3 cm likely representing a stable fibroid. The appendix is visualized and appears unremarkable. No intrahepatic or extrahepatic bile duct dilatation. Calcified gallstone present in the gallbladder at level of the gallbladder neck. Pancreas is unremarkable. Spleen is unremarkable. No retroperitoneal lymphadenopathy. Urinary bladder is contracted. Multiple nonobstructing 1-2 mm left renal calculi. No hydronephrosis or perinephric edema. No ureteral calculus. No pneumonia in the lung bases. 1. Diverticulosis. 2. Cholelithiasis 3. Nonobstructing left renal calculi. 4. No bowel obstruction, free air, or focal inflammatory changes. 5. Stable mass along right aspect of the uterus likely represents a stable fibroid. CT CHEST WO CONTRAST    Result Date: 7/27/2022  EXAMINATION: CT OF THE CHEST WITHOUT CONTRAST 7/27/2022 4:15 pm TECHNIQUE: CT of the chest was performed without the administration of intravenous contrast. Multiplanar reformatted images are provided for review. Automated exposure control, iterative reconstruction, and/or weight based adjustment of the mA/kV was utilized to reduce the radiation dose to as low as reasonably achievable.  COMPARISON: Correlation made with CT chest from September 28, 2016 HISTORY: ORDERING SYSTEM PROVIDED HISTORY: Evaluate for pneumonia TECHNOLOGIST PROVIDED HISTORY: Reason for exam:->Evaluate for pneumonia Decision Support Exception - unselect if not a suspected or confirmed emergency medical condition->Emergency Medical Condition (MA) FINDINGS: Redemonstration of irregular opacities in upper lobes notable on the right. Subsegmental atelectasis or scarring in the lung bases. No airspace opacity or pleural effusion. No pneumothorax. There are multiple enlarged mediastinal lymph nodes measuring up to 2.1 x 1.8 cm in pretracheal location. Multiple prominent lymph nodes present at level of right and left axilla. There is a noncalcified nodule located in the right upper lobe on axial image number 46 measuring 5 mm. The heart is normal in size. No pericardial effusion. Evidence of prior mediastinal surgery. 1. Irregular opacities located in the upper lobes likely related to scarring and atelectasis. 2. No obvious pneumonia or pleural effusion. 3. Mediastinal lymphadenopathy. 4. Noncalcified nodule measuring 5 mm in right upper lobe. Follow-up recommended as indicated below. RECOMMENDATIONS: Fleischner Society guidelines for follow-up and management of incidentally detected pulmonary nodules: Single Solid Nodule: Nodule size less than 6 mm In a low-risk patient, no routine follow-up. In a high-risk patient, optional CT at 12 months. Nodule size equals 6-8 mm In a low-risk patient, CT at 6-12 months, then consider CT at 18-24 months. In a high-risk patient, CT at 6-12 months, then CT at 18-24 months. Nodule size greater than 8 mm In a low-risk patient, consider CT at 3 months, PET/CT, or tissue sampling. In a high-risk patient, consider CT at 3 months, PET/CT, or tissue sampling. Multiple Solid Nodules: Nodule size less than 6 mm In a low-risk patient, no routine follow-up. In a high-risk patient, optional CT at 12 months. Nodule size equals 6-8 mm In a low-risk patient, CT at 3-6 months, then consider CT at 18-24 months. In a high-risk patient, CT at 3-6 months, then CT at 18-24 months. Nodule size greater than 8 mm In a low-risk patient, CT at 3-6 months, then consider CT at 18-24 months. In a high-risk patient, CT at 3-6 months, then CT at 18-24 months. - Low risk patients include individuals with minimal or absent history of smoking and other known risk factors. - High risk patients include individuals with a history or smoking or known risk factors. Radiology 2017 http://pubs. rsna.org/doi/full/10.1148/radiol. 8120451894     XR CHEST PORTABLE    Result Date: 7/28/2022  EXAMINATION: ONE XRAY VIEW OF THE CHEST 7/28/2022 7:07 am COMPARISON: 27 July 2022 HISTORY: ORDERING SYSTEM PROVIDED HISTORY: new oxygen requirement TECHNOLOGIST PROVIDED HISTORY: Reason for exam:->new oxygen requirement FINDINGS: Stable postoperative changes. Bilateral lung opacities appear quite stable and likely reflect underlying parenchymal fibrosis. Normal heart and pulmonary vascularity. Neither costophrenic angle is newly blunted. Stable likely parenchymal fibrosis. XR CHEST PORTABLE    Result Date: 7/27/2022  EXAMINATION: ONE XRAY VIEW OF THE CHEST 7/27/2022 1:04 pm COMPARISON: 10/05/2020 HISTORY: ORDERING SYSTEM PROVIDED HISTORY: Cough, SOB TECHNOLOGIST PROVIDED HISTORY: Reason for exam:->Cough, SOB FINDINGS: The lungs are without acute focal process. There is no effusion or pneumothorax. The cardiomediastinal silhouette is without acute process. The osseous structures are without acute process. Sternotomy wires noted. No acute process. Objective:   Vitals: BP (!) 137/57   Pulse 84   Temp 99.9 °F (37.7 °C) (Oral)   Resp 18   Ht 5' 4\" (1.626 m)   Wt 223 lb 3.2 oz (101.2 kg)   SpO2 94%   BMI 38.31 kg/m²   General appearance: appears stated age   Skin:  No rashes or lesions  HEENT: Head: Normocephalic, no lesions, without obvious abnormality.   Neck: no adenopathy, no carotid bruit, no JVD, supple, symmetrical, trachea midline, and thyroid not enlarged, symmetric, no tenderness/mass/nodules  Lungs: clear to auscultation bilaterally  Heart: regular rate and rhythm, S1, S2 normal, no murmur, click, rub or gallop  Abdomen: soft, non-tender; bowel sounds normal; no masses,  no organomegaly  Extremities: extremities normal, atraumatic, no cyanosis or edema  Neurologic: Mental status: Alert, oriented, thought content appropriate    Assessment:   Patient Active Problem List:     S/P AVR     S/P MVR (mitral valve repair)     Hypervolemia     PAF (paroxysmal atrial fibrillation) ([facility])     A-fib (H[facility])     Essential hypertension     Fever of unknown origin     Leukocytosis     Stage 3b chronic kidney disease (HCC)    Plan:     Assessment/Plan     1) Known CKD stage 3B ,renal functions at baseline , CKD on the basis of HTN and DM       With evident protein in urine - microvascular disease related        CT scan ruled out any obstruction      2) Fever - subsided , Blood c/s results awaited , viral screen so far negative , no obvoius s/o       Active infection     3) Valvular heart disease , A fib , on eliquis ,CAD , Cardiology comments noted     4)  HTN controlled     5) Type 2 DM fair control of sugars     6) H/o Lymphoma     Urine microalbumin ++ ,  , secondary hyperparathyroidism, low vit D 25   Will add ergocalciferol      Vicenta Chowdary MD

## 2022-07-29 NOTE — PROGRESS NOTES
Informed that patient was experiencing non-reproducible chest pain. EKG was normal. Temperature 100.3F and was given Tylenol. Arrived at bedside with attending. Patient stated that pain was sharp and resolved on it's own,  stated that pain lasted for 30 mins. Assessed patient and bedside and she was stable. EKG did not reveal any acute changes. Patient stated that she does have panic attacks but this episode was not similar to that. This event also did not match symptoms she experienced in the past which led to CABG. This event may be attributed to anxiety but also cardiac component given history. However EKG stable and troponin drawn at this time did not indicate troponin spill.     Xenia Ojeda MD  7/29/22

## 2022-07-29 NOTE — CARE COORDINATION
Updated plan of care. Pt home o2 through [employer]-confirming with wilfredo Shaferison.  Plan remains home with no needs-arun

## 2022-07-29 NOTE — PROGRESS NOTES
[address]  Progress Note    Date:7/29/2022       SGUR:[id number]  Patient Cris Steven     YOB: 1950     Age:69 y.o. No chief complaint on file. DATE OF SERVICE 7/29/22    SUBJECTIVE     Patient was seen today. No acute events overnight    Patient was lying upright on her bed. She was complaining about cough that is brownish/greenish sputum. Denies CP, abdominal pain, N/V, changes in urination, BM. As per nurse, no overnight events     Past medical, surgical, family and social history were reviewed, non-contributory, and unchanged unless otherwise stated. Review of Systems   Review of Systems as above, otherwise negative    Medications   Scheduled Meds:    pantoprazole (PROTONIX) 40 mg injection  40 mg IntraVENous Daily    furosemide  40 mg Oral Daily    atorvastatin  40 mg Oral Nightly    sodium chloride flush  5-40 mL IntraVENous 2 times per day    [Held by provider] apixaban  5 mg Oral BID     Continuous Infusions:    sodium chloride       PRN Meds: perflutren lipid microspheres, sodium chloride flush, sodium chloride, polyethylene glycol, acetaminophen **OR** acetaminophen, trimethobenzamide, labetalol    Past History    Past Medical History:   has a past medical history of A-fib ([facility] [address] ARF (acute renal failure) ([facility] [address] Cancer ([address] Cardiac dysfunction, Diabetes mellitus ([facility] [address] Diverticulitis, History of deep vein thrombosis, Hx of blood clots, Poor venous access, S/P AVR, S/P IVC filter, Traumatic seroma of left thigh ([address] and Ventilator dependent ([facility] [address] Social History:   reports that she has never smoked. She has never used smokeless tobacco. She reports that she does not drink alcohol and does not use drugs. Family History: No family history on file.     OBJECTIVE     Vitals:  /65   Pulse 77   Temp 99.4 °F (37.4 °C) (Oral)   Resp 18   Ht 5' 4\" (1.626 m)   Wt 223 lb 3.2 oz (101.2 kg)   SpO2 94%   BMI 38.31 kg/m²   Temp (24hrs), Av °F (37.2 °C), Min:98.6 °F (37 °C), Max:99.4 °F (37.4 °C)      I/O (24Hr): No intake or output data in the 24 hours ending 22 1206    Physical Examination        Physical Exam  Vitals and nursing note reviewed. Constitutional:       General: She is not in acute distress. Appearance: Normal appearance. HENT:      Head: Normocephalic and atraumatic. Eyes:      General:         Right eye: No discharge. Left eye: No discharge. Cardiovascular:      Rate and Rhythm: Normal rate and regular rhythm. Heart sounds: No murmur heard. Comments: Regular rate regular rhythm    Pulmonary:      Effort: Pulmonary effort is normal.      Breath sounds: Rhonchi present. No wheezing. Abdominal:      General: Bowel sounds are normal.      Palpations: Abdomen is soft. Tenderness: There is no abdominal tenderness. Musculoskeletal:         General: No swelling. Normal range of motion. Right lower leg: No edema. Left lower leg: No edema. Skin:     General: Skin is warm and dry. Neurological:      General: No focal deficit present. Mental Status: She is oriented to person, place, and time. Labs/Imaging/Diagnostics   Labs:  CBC:  Recent Labs     22  1303 22  0235   WBC 13.4* 15.8*   RBC 4.85 4.50   HGB 14.9 13.7   HCT 45.1 42.3   MCV 93.0 94.0   RDW 13.9 14.1    173     CHEMISTRIES:  Recent Labs     22  1303 22  0235    138   K 4.4 4.5    104   CO2 26 20*   BUN 33* 35*   CREATININE 1.4* 1.6*   GLUCOSE 127* 199*     PT/INR:No results for input(s): PROTIME, INR in the last 72 hours. APTT:No results for input(s): APTT in the last 72 hours.   LIVER PROFILE:  Recent Labs     22  1303 22  0235   AST 16 31   ALT 17 28   BILITOT 0.4 0.8   ALKPHOS 72 73       Imaging Last 24 Hours:  CT ABDOMEN PELVIS WO CONTRAST Additional Contrast? None    Result Date: 2022  EXAMINATION: CT OF THE ABDOMEN AND PELVIS WITHOUT CONTRAST 7/27/2022 4:15 pm TECHNIQUE: CT of the abdomen and pelvis was performed without the administration of intravenous contrast. Multiplanar reformatted images are provided for review. Automated exposure control, iterative reconstruction, and/or weight based adjustment of the mA/kV was utilized to reduce the radiation dose to as low as reasonably achievable. COMPARISON: May 30, 2020 HISTORY: ORDERING SYSTEM PROVIDED HISTORY: Fever TECHNOLOGIST PROVIDED HISTORY: Reason for exam:->Fever Additional Contrast?->None Decision Support Exception - unselect if not a suspected or confirmed emergency medical condition->Emergency Medical Condition (MA) FINDINGS: No bowel obstruction, free air, or free fluid. Numerous diverticula involving left colon, cecum, and sigmoid colon. No evidence of acute diverticulitis. Stable mass along the right aspect of the uterus measures 3 x 3 cm likely representing a stable fibroid. The appendix is visualized and appears unremarkable. No intrahepatic or extrahepatic bile duct dilatation. Calcified gallstone present in the gallbladder at level of the gallbladder neck. Pancreas is unremarkable. Spleen is unremarkable. No retroperitoneal lymphadenopathy. Urinary bladder is contracted. Multiple nonobstructing 1-2 mm left renal calculi. No hydronephrosis or perinephric edema. No ureteral calculus. No pneumonia in the lung bases. 1. Diverticulosis. 2. Cholelithiasis 3. Nonobstructing left renal calculi. 4. No bowel obstruction, free air, or focal inflammatory changes. 5. Stable mass along right aspect of the uterus likely represents a stable fibroid. CT CHEST WO CONTRAST    Result Date: 7/27/2022  EXAMINATION: CT OF THE CHEST WITHOUT CONTRAST 7/27/2022 4:15 pm TECHNIQUE: CT of the chest was performed without the administration of intravenous contrast. Multiplanar reformatted images are provided for review.  Automated exposure control, iterative reconstruction, and/or weight based adjustment of the mA/kV was utilized to reduce the radiation dose to as low as reasonably achievable. COMPARISON: Correlation made with CT chest from September 28, 2016 HISTORY: ORDERING SYSTEM PROVIDED HISTORY: Evaluate for pneumonia TECHNOLOGIST PROVIDED HISTORY: Reason for exam:->Evaluate for pneumonia Decision Support Exception - unselect if not a suspected or confirmed emergency medical condition->Emergency Medical Condition (MA) FINDINGS: Redemonstration of irregular opacities in upper lobes notable on the right. Subsegmental atelectasis or scarring in the lung bases. No airspace opacity or pleural effusion. No pneumothorax. There are multiple enlarged mediastinal lymph nodes measuring up to 2.1 x 1.8 cm in pretracheal location. Multiple prominent lymph nodes present at level of right and left axilla. There is a noncalcified nodule located in the right upper lobe on axial image number 46 measuring 5 mm. The heart is normal in size. No pericardial effusion. Evidence of prior mediastinal surgery. 1. Irregular opacities located in the upper lobes likely related to scarring and atelectasis. 2. No obvious pneumonia or pleural effusion. 3. Mediastinal lymphadenopathy. 4. Noncalcified nodule measuring 5 mm in right upper lobe. Follow-up recommended as indicated below. RECOMMENDATIONS: Fleischner Society guidelines for follow-up and management of incidentally detected pulmonary nodules: Single Solid Nodule: Nodule size less than 6 mm In a low-risk patient, no routine follow-up. In a high-risk patient, optional CT at 12 months. Nodule size equals 6-8 mm In a low-risk patient, CT at 6-12 months, then consider CT at 18-24 months. In a high-risk patient, CT at 6-12 months, then CT at 18-24 months. Nodule size greater than 8 mm In a low-risk patient, consider CT at 3 months, PET/CT, or tissue sampling. In a high-risk patient, consider CT at 3 months, PET/CT, or tissue sampling.  Multiple Solid Nodules: Nodule size less than 6 mm In a low-risk patient, no routine follow-up. In a high-risk patient, optional CT at 12 months. Nodule size equals 6-8 mm In a low-risk patient, CT at 3-6 months, then consider CT at 18-24 months. In a high-risk patient, CT at 3-6 months, then CT at 18-24 months. Nodule size greater than 8 mm In a low-risk patient, CT at 3-6 months, then consider CT at 18-24 months. In a high-risk patient, CT at 3-6 months, then CT at 18-24 months. - Low risk patients include individuals with minimal or absent history of smoking and other known risk factors. - High risk patients include individuals with a history or smoking or known risk factors. Radiology 2017 http://pubs. rsna.org/doi/full/10.1148/radiol. 6733157170     XR CHEST PORTABLE    Result Date: 7/28/2022  EXAMINATION: ONE XRAY VIEW OF THE CHEST 7/28/2022 7:07 am COMPARISON: 27 July 2022 HISTORY: ORDERING SYSTEM PROVIDED HISTORY: new oxygen requirement TECHNOLOGIST PROVIDED HISTORY: Reason for exam:->new oxygen requirement FINDINGS: Stable postoperative changes. Bilateral lung opacities appear quite stable and likely reflect underlying parenchymal fibrosis. Normal heart and pulmonary vascularity. Neither costophrenic angle is newly blunted. Stable likely parenchymal fibrosis. XR CHEST PORTABLE    Result Date: 7/27/2022  EXAMINATION: ONE XRAY VIEW OF THE CHEST 7/27/2022 1:04 pm COMPARISON: 10/05/2020 HISTORY: ORDERING SYSTEM PROVIDED HISTORY: Cough, SOB TECHNOLOGIST PROVIDED HISTORY: Reason for exam:->Cough, SOB FINDINGS: The lungs are without acute focal process. There is no effusion or pneumothorax. The cardiomediastinal silhouette is without acute process. The osseous structures are without acute process. Sternotomy wires noted. No acute process.        ASSESSMENT        Hospital Problems             Last Modified POA    * (Principal) Fever of unknown origin 7/28/2022 Yes    Leukocytosis 7/27/2022 Yes    Stage 3b chronic kidney disease ([facility] [address] 7/27/2022 Yes    S/P MVR (mitral valve repair) 7/27/2022 Yes    PAF (paroxysmal atrial fibrillation) ([facility] [address] 7/27/2022 Yes    Essential hypertension 7/27/2022 Yes       PLAN        Fever of unknown origin  Temperature of 101.3 in the ER. Temperature today 99.9. CT chest shows mediastinal lymphadenopathy  - WBC 15.8>  - Antibiotics on hold  - Blood cx negative  - Urine cx negative  - ESR 40  - procalcitonin 0.10  - CBC/BMP pending    Nausea and Cough  3-4 of dry heaving prior to admission. Increased brownish/greenish sputum.   - nausea improved  - Tigan 200 q6h prn  - Tessalon vs Mucinex? A. Fib  No home medications  - Eliquis 5 mg BID currently held  - DC Nifedipine  - Telemetry    HTN  /58  - Received one dose Labetalol 5 mg PRN  - Will continue to monitor     CKD stage 3b  Creatinine baseline 1.4-1.5.   - Labs pending  - IVF  cc/hr    DM  A1C: 6.7 6/21, not on any meds at home  A1C pending  POCT glucose pending     HLD  Lipitor 40 mg daily     Coronary artery disease  S/p CABG 2016, ECHO 2016 showed 65% ejection fraction. Hx of AV replacement x2, mitral valve repair  Follows with [facility]     DVT ppx: Eliquis   GI ppx: None  Code Status: Full  Diet: General diet    DVT Prophylaxis: Eliquis   GI Prophylaxis: Protonix  Diet: ADULT DIET;  Regular   Code: full   Advance Directives       Power of  Living Will ACP-Advance Directive ACP-Power of     Not on File Not on File Not on File Not on File             Electronically signed by Ramila Stevenson MD PGY-1 Family Medicine Resident on 07/29/22 at 6:43 AM

## 2022-07-29 NOTE — PROGRESS NOTES
Spoke with Dr. Vick Jasmine at [facility] this morning regarding CT chest imaging for further discussion on results in the setting of fever of unknown origin. Stated that T spine can be clearly visualized and there are no signs of discitis, disc osteomyelitis. He states that mediastinal lymphadenopathy and axillary lymphadenopathy can be seen which can be a result of URI. Stated that addition of IV contrast would not have provided much further information. Denies seeing phlegmon or abscess in imaging. Scarring of lungs is present.      Darius Lua MD  7/29/22

## 2022-07-29 NOTE — PLAN OF CARE
Problem: Discharge Planning  Goal: Discharge to home or other facility with appropriate resources  Outcome: Completed     Problem: Pain  Goal: Verbalizes/displays adequate comfort level or baseline comfort level  Outcome: Completed     Problem: Safety - Adult  Goal: Free from fall injury  Outcome: Completed     Problem: Chronic Conditions and Co-morbidities  Goal: Patient's chronic conditions and co-morbidity symptoms are monitored and maintained or improved  Outcome: Completed

## 2022-08-01 LAB
BLOOD CULTURE, ROUTINE: NORMAL
CULTURE, BLOOD 2: NORMAL

## 2022-08-08 ENCOUNTER — OFFICE VISIT (OUTPATIENT)
Dept: FAMILY MEDICINE CLINIC | Age: 72
End: 2022-08-08
Payer: MEDICARE

## 2022-08-08 VITALS
RESPIRATION RATE: 16 BRPM | SYSTOLIC BLOOD PRESSURE: 119 MMHG | BODY MASS INDEX: 37.39 KG/M2 | HEART RATE: 73 BPM | OXYGEN SATURATION: 95 % | DIASTOLIC BLOOD PRESSURE: 63 MMHG | HEIGHT: 64 IN | WEIGHT: 219 LBS | TEMPERATURE: 97.7 F

## 2022-08-08 DIAGNOSIS — N18.32 STAGE 3B CHRONIC KIDNEY DISEASE (HCC): ICD-10-CM

## 2022-08-08 DIAGNOSIS — E66.9 DIABETES MELLITUS TYPE 2 IN OBESE (HCC): ICD-10-CM

## 2022-08-08 DIAGNOSIS — J18.9 PNEUMONIA DUE TO INFECTIOUS ORGANISM, UNSPECIFIED LATERALITY, UNSPECIFIED PART OF LUNG: Primary | ICD-10-CM

## 2022-08-08 DIAGNOSIS — E11.69 DIABETES MELLITUS TYPE 2 IN OBESE (HCC): ICD-10-CM

## 2022-08-08 DIAGNOSIS — E66.01 SEVERE OBESITY (BMI 35.0-39.9) WITH COMORBIDITY (HCC): ICD-10-CM

## 2022-08-08 DIAGNOSIS — Z76.89 ENCOUNTER TO ESTABLISH CARE: ICD-10-CM

## 2022-08-08 PROCEDURE — G8427 DOCREV CUR MEDS BY ELIG CLIN: HCPCS

## 2022-08-08 PROCEDURE — G8417 CALC BMI ABV UP PARAM F/U: HCPCS

## 2022-08-08 PROCEDURE — 1090F PRES/ABSN URINE INCON ASSESS: CPT

## 2022-08-08 PROCEDURE — 99214 OFFICE O/P EST MOD 30 MIN: CPT

## 2022-08-08 PROCEDURE — 2022F DILAT RTA XM EVC RTNOPTHY: CPT

## 2022-08-08 PROCEDURE — G8400 PT W/DXA NO RESULTS DOC: HCPCS

## 2022-08-08 PROCEDURE — 1111F DSCHRG MED/CURRENT MED MERGE: CPT

## 2022-08-08 PROCEDURE — 1036F TOBACCO NON-USER: CPT

## 2022-08-08 PROCEDURE — 1123F ACP DISCUSS/DSCN MKR DOCD: CPT

## 2022-08-08 PROCEDURE — 3046F HEMOGLOBIN A1C LEVEL >9.0%: CPT

## 2022-08-08 PROCEDURE — 3017F COLORECTAL CA SCREEN DOC REV: CPT

## 2022-08-08 SDOH — ECONOMIC STABILITY: FOOD INSECURITY: WITHIN THE PAST 12 MONTHS, THE FOOD YOU BOUGHT JUST DIDN'T LAST AND YOU DIDN'T HAVE MONEY TO GET MORE.: NEVER TRUE

## 2022-08-08 SDOH — ECONOMIC STABILITY: FOOD INSECURITY: WITHIN THE PAST 12 MONTHS, YOU WORRIED THAT YOUR FOOD WOULD RUN OUT BEFORE YOU GOT MONEY TO BUY MORE.: NEVER TRUE

## 2022-08-08 ASSESSMENT — ENCOUNTER SYMPTOMS
RHINORRHEA: 0
WHEEZING: 0
EYE PAIN: 0
CHEST TIGHTNESS: 0
SHORTNESS OF BREATH: 0
HEMOPTYSIS: 0
DIARRHEA: 0
SORE THROAT: 0
COUGH: 0
NAUSEA: 0

## 2022-08-08 ASSESSMENT — PATIENT HEALTH QUESTIONNAIRE - PHQ9
DEPRESSION UNABLE TO ASSESS: FUNCTIONAL CAPACITY MOTIVATION LIMITS ACCURACY
SUM OF ALL RESPONSES TO PHQ QUESTIONS 1-9: 0
2. FEELING DOWN, DEPRESSED OR HOPELESS: 0
SUM OF ALL RESPONSES TO PHQ QUESTIONS 1-9: 0
SUM OF ALL RESPONSES TO PHQ9 QUESTIONS 1 & 2: 0
SUM OF ALL RESPONSES TO PHQ QUESTIONS 1-9: 0
SUM OF ALL RESPONSES TO PHQ QUESTIONS 1-9: 0
1. LITTLE INTEREST OR PLEASURE IN DOING THINGS: 0

## 2022-08-08 ASSESSMENT — SOCIAL DETERMINANTS OF HEALTH (SDOH): HOW HARD IS IT FOR YOU TO PAY FOR THE VERY BASICS LIKE FOOD, HOUSING, MEDICAL CARE, AND HEATING?: NOT HARD AT ALL

## 2022-08-08 NOTE — PROGRESS NOTES
[address]  Precepting Note    Subjective:  New patient  Recent admission for FUO  Was diagnosed with sinusitis  Went to [facility] again and was diagnosed with Cefdinir  No fever, chills, dyspnea    ROS otherwise negative     Past medical, surgical, family and social history were reviewed, non-contributory, and unchanged unless otherwise stated. Objective:    /63   Pulse 73   Temp 97.7 °F (36.5 °C)   Resp 16   Ht 5' 4\" (1.626 m)   Wt 219 lb (99.3 kg)   SpO2 95%   BMI 37.59 kg/m²     Exam is as noted by resident     Assessment/Plan:  Establish care  Recent pneumonia: completed Omnicef  Hyperlipidemia: continue  Prosthetic AS, MV repair: continue diurectics, following with cardiology   Following with pulm for interstitial fibrosis    F/u in 1 month     Attending Physician Statement  I have reviewed the chart, including any radiology or labs. I have discussed the case, including pertinent history and exam findings with the resident. I agree with the assessment, plan and orders as documented by the resident. Please refer to the resident note for additional information.       Electronically signed by Serenity Laura MD on 8/8/2022 at 1:53 PM

## 2022-08-08 NOTE — PROGRESS NOTES
Constitutional:  Negative for chills and fever. HENT:  Negative for congestion, rhinorrhea and sore throat. Eyes:  Negative for pain and visual disturbance. Respiratory:  Negative for cough, hemoptysis, shortness of breath and wheezing. Cardiovascular:  Negative for chest pain and palpitations. Gastrointestinal:  Negative for diarrhea and nausea. Genitourinary:  Negative for dysuria and hematuria. Musculoskeletal:  Negative for arthralgias and myalgias. Skin:  Negative for rash. Neurological:  Negative for dizziness and headaches. Objective:  Vitals:    08/08/22 1306   BP: 119/63   Pulse: 73   Resp: 16   Temp: 97.7 °F (36.5 °C)   SpO2: 95%   Weight: 219 lb (99.3 kg)   Height: 5' 4\" (1.626 m)     Physical Exam  Vitals and nursing note reviewed. Constitutional:       General: She is not in acute distress. Appearance: Normal appearance. HENT:      Head: Normocephalic and atraumatic. Eyes:      General:         Right eye: No discharge. Left eye: No discharge. Cardiovascular:      Rate and Rhythm: Normal rate and regular rhythm. Heart sounds: No murmur heard. Pulmonary:      Effort: Pulmonary effort is normal.      Breath sounds: No wheezing. Abdominal:      General: Bowel sounds are normal.      Palpations: Abdomen is soft. Tenderness: There is no abdominal tenderness. Musculoskeletal:         General: No swelling. Normal range of motion. Right lower leg: No edema. Left lower leg: No edema. Skin:     General: Skin is warm and dry. Neurological:      General: No focal deficit present. Mental Status: She is oriented to person, place, and time. Assessment:  1. Pneumonia due to infectious organism, unspecified laterality, unspecified part of lung     Patient went to [facility] for a fever of 102 F. DOA - 7/30, patient spent x 6 days undergoing testing for fever of unknown origin.  Patient was eventually diagnosed with pneumonia and sent home on Cefdinir. She finished course of antibiotics. Patient presented today reporting improvement of symptoms. No fever, headaches, SOB, or sputum production. No wheezing, lungs were CTA bilaterally. 2. Encounter to establish care     Patient has a history of CAD s/p CABG, A.fib not currently taking medication, interstitial fibrosis, HTN, HLD, DM2, that has come today to establish care. Patient was recently discharged from [facility] and refused to do blood work, reporting she has given too much blood and requested to do the lab work at a different time. 3. Severe obesity (BMI 35.0-39. 9) with comorbidity ([address]     Patient has kept a steady BMI of 37.7 kg/m2 the past 3 years with no significant weight loss. Patient likes walking but has found mild difficulty ambulating around the grocery stores with increased respiratory effort. Patient has a desire to lose weight as she understands that it will benefit her overall wellbeing especially with her cardio-pulmonary history. 4. Diabetes mellitus type 2 in obese ([address]     Patient's last A1c - 6.7 (2020). She is not currently on any medication. Patient refused blood work today due to her recent hospital admission and giving too much blood then. Patient has requested to do the blood work at a later date. 5. Stage 3b chronic kidney disease ([address]     Patient's last GFR 30, Creatinine at 1.7 (7/29/2022). Patient is following up with Nephrology        Plan:   Diagnosis Orders   1. Pneumonia due to infectious organism, unspecified laterality, unspecified part of lung       - resolved  - Patient counseled on albuterol medication   - patient refused Covid vaccine, Pneumococcal vaccine today  - Patient is on 1L O2 at home prn. Patient is following up with Pulmonologist     2. Encounter to establish care       - Patient will do blood work at follow-up      3. Severe obesity (BMI 35.0-39. 9) with comorbidity ([address]       - Patient counseled on exercise and diet  - RTC x 4 weeks     4. Diabetes mellitus type 2 in obese [facility])       - Patient not taking medication currently  - Patient will have follow up lab work done     5. Stage 3b chronic kidney disease ([facility] [address]       - Follow up with Nephrology       Medication indications,directions, and side effects were discussed. Return in about 4 weeks (around 9/5/2022) for Dr. Xochitl Nugent.     Electronically signed by Tani Horton MD on 8/8/22 at 1:09 PM EDT

## 2022-09-06 ENCOUNTER — OFFICE VISIT (OUTPATIENT)
Dept: FAMILY MEDICINE CLINIC | Age: 72
End: 2022-09-06
Payer: MEDICARE

## 2022-09-06 VITALS
DIASTOLIC BLOOD PRESSURE: 83 MMHG | HEART RATE: 71 BPM | OXYGEN SATURATION: 93 % | SYSTOLIC BLOOD PRESSURE: 153 MMHG | BODY MASS INDEX: 37.39 KG/M2 | WEIGHT: 219 LBS | RESPIRATION RATE: 16 BRPM | HEIGHT: 64 IN

## 2022-09-06 DIAGNOSIS — Z00.00 INITIAL MEDICARE ANNUAL WELLNESS VISIT: Primary | ICD-10-CM

## 2022-09-06 DIAGNOSIS — Z78.0 ASYMPTOMATIC MENOPAUSAL STATE: ICD-10-CM

## 2022-09-06 PROCEDURE — 3017F COLORECTAL CA SCREEN DOC REV: CPT

## 2022-09-06 PROCEDURE — 1123F ACP DISCUSS/DSCN MKR DOCD: CPT

## 2022-09-06 PROCEDURE — G0438 PPPS, INITIAL VISIT: HCPCS

## 2022-09-06 RX ORDER — FUROSEMIDE 40 MG/1
40 TABLET ORAL NIGHTLY
Qty: 30 TABLET | Refills: 1 | Status: SHIPPED
Start: 2022-09-06 | End: 2022-10-31

## 2022-09-06 ASSESSMENT — LIFESTYLE VARIABLES
HOW OFTEN DO YOU HAVE A DRINK CONTAINING ALCOHOL: NEVER
HOW MANY STANDARD DRINKS CONTAINING ALCOHOL DO YOU HAVE ON A TYPICAL DAY: PATIENT DOES NOT DRINK

## 2022-09-06 ASSESSMENT — PATIENT HEALTH QUESTIONNAIRE - PHQ9
SUM OF ALL RESPONSES TO PHQ QUESTIONS 1-9: 0
DEPRESSION UNABLE TO ASSESS: FUNCTIONAL CAPACITY MOTIVATION LIMITS ACCURACY
2. FEELING DOWN, DEPRESSED OR HOPELESS: 0
1. LITTLE INTEREST OR PLEASURE IN DOING THINGS: 0
SUM OF ALL RESPONSES TO PHQ9 QUESTIONS 1 & 2: 0
SUM OF ALL RESPONSES TO PHQ QUESTIONS 1-9: 0

## 2022-09-06 ASSESSMENT — ENCOUNTER SYMPTOMS
WHEEZING: 0
SHORTNESS OF BREATH: 0
DIARRHEA: 0
SORE THROAT: 0
RHINORRHEA: 0
EYE PAIN: 0
NAUSEA: 0

## 2022-09-06 NOTE — PROGRESS NOTES
[address]  Precepting Note    Subjective:  Medicare AWV  Doing well  Form reviewed. Anticipatory guidance, HM, and living will discussions. ROS otherwise negative    Past medical, surgical, family and social history were reviewed, non-contributory, and unchanged unless otherwise stated. Objective:    BP (!) 153/83   Pulse 71   Resp 16   Ht 5' 4\" (1.626 m)   Wt 219 lb (99.3 kg)   SpO2 93%   BMI 37.59 kg/m²     Exam is as noted by resident with the following changes, additions or corrections:    General:  NAD; alert & oriented x 3   Heart:  RRR, 2/6 sys RUSB, LUSB murmur, gallops, or rubs. Lungs:  CTA bilaterally, no wheeze, rales or rhonchi  Abd: bowel sounds present, nontender, nondistended, no masses  Extrem:  No clubbing, cyanosis, or edema    Assessment/Plan:    Medicare AWV  HM updated as agreeable and counseled as documented and AVS     Attending Physician Statement  I have reviewed the chart, including any radiology or labs, and have seen the patient with the resident(s). I personally reviewed and performed key elements of the history and exam.  I agree with the assessment, plan and orders as documented by the resident. Please refer to the resident note for additional information.       Electronically signed by Elliot Vyas MD on 9/6/2022 at 11:37 AM

## 2022-09-06 NOTE — PATIENT INSTRUCTIONS
Personalized Preventive Plan for Mady Mckeon - 9/6/2022  Medicare offers a range of preventive health benefits. Some of the tests and screenings are paid in full while other may be subject to a deductible, co-insurance, and/or copay. Some of these benefits include a comprehensive review of your medical history including lifestyle, illnesses that may run in your family, and various assessments and screenings as appropriate. After reviewing your medical record and screening and assessments performed today your provider may have ordered immunizations, labs, imaging, and/or referrals for you. A list of these orders (if applicable) as well as your Preventive Care list are included within your After Visit Summary for your review. Other Preventive Recommendations:    A preventive eye exam performed by an eye specialist is recommended every 1-2 years to screen for glaucoma; cataracts, macular degeneration, and other eye disorders. A preventive dental visit is recommended every 6 months. Try to get at least 150 minutes of exercise per week or 10,000 steps per day on a pedometer . Order or download the FREE \"Exercise & Physical Activity: Your Everyday Guide\" from [employer]. Call 0-243.894.7318 or search [employer] online. You need 6249-0612 mg of calcium and 8711-9618 IU of vitamin D per day. It is possible to meet your calcium requirement with diet alone, but a vitamin D supplement is usually necessary to meet this goal.  When exposed to the sun, use a sunscreen that protects against both UVA and UVB radiation with an SPF of 30 or greater. Reapply every 2 to 3 hours or after sweating, drying off with a towel, or swimming. Always wear a seat belt when traveling in a car. Always wear a helmet when riding a bicycle or motorcycle.

## 2022-09-10 ENCOUNTER — HOSPITAL ENCOUNTER (OUTPATIENT)
Age: 72
Discharge: HOME | End: 2022-09-10
Payer: MEDICARE

## 2022-09-10 LAB
ANION GAP SERPL CALCULATED.3IONS-SCNC: 13 MMOL/L (ref 7–16)
BACTERIA: NORMAL /HPF
BILIRUBIN URINE: NEGATIVE
BLOOD, URINE: ABNORMAL
BUN BLDV-MCNC: 29 MG/DL (ref 6–23)
CALCIUM SERPL-MCNC: 9.7 MG/DL (ref 8.6–10.2)
CHLORIDE BLD-SCNC: 105 MMOL/L (ref 98–107)
CLARITY: CLEAR
CO2: 20 MMOL/L (ref 22–29)
COLOR: YELLOW
CREAT SERPL-MCNC: 1.4 MG/DL (ref 0.5–1)
CREATININE URINE: 106 MG/DL (ref 29–226)
EPITHELIAL CELLS, UA: NORMAL /HPF
GFR AFRICAN AMERICAN: 45
GFR NON-AFRICAN AMERICAN: 37 ML/MIN/1.73
GLUCOSE BLD-MCNC: 107 MG/DL (ref 74–99)
GLUCOSE URINE: NEGATIVE MG/DL
KETONES, URINE: NEGATIVE MG/DL
LEUKOCYTE ESTERASE, URINE: NEGATIVE
MICROALBUMIN UR-MCNC: 868.2 MG/L
MICROALBUMIN/CREAT UR-RTO: 819.1 (ref 0–30)
NITRITE, URINE: NEGATIVE
PH UA: 5.5 (ref 5–9)
POTASSIUM SERPL-SCNC: 4.7 MMOL/L (ref 3.5–5)
PROTEIN UA: 100 MG/DL
RBC UA: NORMAL /HPF (ref 0–2)
SODIUM BLD-SCNC: 138 MMOL/L (ref 132–146)
SPECIFIC GRAVITY UA: 1.02 (ref 1–1.03)
UROBILINOGEN, URINE: 0.2 E.U./DL
WBC UA: NORMAL /HPF (ref 0–5)

## 2022-09-10 PROCEDURE — 36415 COLL VENOUS BLD VENIPUNCTURE: CPT

## 2022-09-10 PROCEDURE — 82570 ASSAY OF URINE CREATININE: CPT

## 2022-09-10 PROCEDURE — 82044 UR ALBUMIN SEMIQUANTITATIVE: CPT

## 2022-09-10 PROCEDURE — 80048 BASIC METABOLIC PNL TOTAL CA: CPT

## 2022-09-10 PROCEDURE — 81001 URINALYSIS AUTO W/SCOPE: CPT

## 2022-09-27 ENCOUNTER — TELEPHONE (OUTPATIENT)
Dept: FAMILY MEDICINE CLINIC | Age: 72
End: 2022-09-27

## 2022-09-27 NOTE — TELEPHONE ENCOUNTER
Patient needs a new script for a handicap placard for five years please. Please call patient when ready. Patient will be leaving for vacation in one week and would like it before then.

## 2022-10-04 ENCOUNTER — HOSPITAL ENCOUNTER (EMERGENCY)
Age: 72
Discharge: LEFT BEFORE DISCHARGE | End: 2022-10-04

## 2022-10-04 VITALS
BODY MASS INDEX: 37.39 KG/M2 | RESPIRATION RATE: 16 BRPM | HEART RATE: 72 BPM | HEIGHT: 64 IN | OXYGEN SATURATION: 93 % | WEIGHT: 219 LBS | TEMPERATURE: 97.9 F | DIASTOLIC BLOOD PRESSURE: 60 MMHG | SYSTOLIC BLOOD PRESSURE: 123 MMHG

## 2022-10-04 NOTE — ED TRIAGE NOTES
Department of Emergency Medicine  FIRST PROVIDER TRIAGE NOTE             [facility]           10/4/22  11:50 PM EDT    Date of Encounter: 10/4/22   MRN: [id number]      HPI: Jade Trujillo is a 67 y.o. female who presents to the ED for rash under bilateral breasts as well as increasing shortness of breath since being discharged from the [facility]. ROS: Negative for cp. PE: Gen Appearance/Constitutional: alert  CV: regular rate  Pulm: CTA bilat  Musculoskeletal: moves all extremities x 4  Integumentary: Martin Gill RN present for assessment. Erythematous, raised rash under the bilateral breasts    Vitals:    10/04/22 1148   BP: 123/60   Pulse: 72   Resp: 16   Temp: 97.9 °F (36.6 °C)   SpO2: 93%       Past medical history, surgical history, and medications reviewed. Initial Plan of Care: All treatment areas within department are currently occupied. Plan to order/initiate the following while awaiting opening in ED: labs, EKG, and imaging studies. Initiate treatment/testing, proceed to treatment area when bed available for ED Attending/MLP to continue care. Electronically signed by ROOSEVELT Guzmán CNP   DD: 10/4/22                         Department of Emergency Medicine  FIRST PROVIDER ELOPEMENT NOTE                 [facility]          10/4/22  12:56 PM EDT    MRN: [id number]    HPI: Jade Trujillo is a 67 y.o. female who presents to the ED with the following complaint: Wound Check (redness under lt breast) and Shortness of Breath      (Please refer to [address] [place] [address] for pertinent ROS and PE documentation)  Labs:  No results found for this visit on 10/04/22. Imaging: All Radiology results interpreted by Radiologist unless otherwise noted.   No orders to display     ED Course    Medications - No data to display     -------------------------  Disposition    Patient ELOPED from the department prior to completion of evaluation after triage stating that she would rather go back to [facility] clinic to be evaluated. Results of triage orders that were completed at time of elopement as indicated above were reviewed.     Diagnosis at Time of Elopement: (Based on presenting complaint/available test results):   Rash     Electronically signed by ROOSEVELT De Souza CNP   DD: 10/4/22

## 2022-10-05 ENCOUNTER — OFFICE VISIT (OUTPATIENT)
Dept: FAMILY MEDICINE CLINIC | Age: 72
End: 2022-10-05
Payer: MEDICARE

## 2022-10-05 VITALS
RESPIRATION RATE: 18 BRPM | DIASTOLIC BLOOD PRESSURE: 72 MMHG | TEMPERATURE: 97.7 F | BODY MASS INDEX: 37.22 KG/M2 | HEART RATE: 78 BPM | SYSTOLIC BLOOD PRESSURE: 121 MMHG | HEIGHT: 64 IN | WEIGHT: 218 LBS | OXYGEN SATURATION: 93 %

## 2022-10-05 DIAGNOSIS — B37.89 CANDIDIASIS OF BREAST: Primary | ICD-10-CM

## 2022-10-05 PROCEDURE — 1036F TOBACCO NON-USER: CPT

## 2022-10-05 PROCEDURE — 99213 OFFICE O/P EST LOW 20 MIN: CPT

## 2022-10-05 PROCEDURE — 1123F ACP DISCUSS/DSCN MKR DOCD: CPT

## 2022-10-05 PROCEDURE — G8484 FLU IMMUNIZE NO ADMIN: HCPCS

## 2022-10-05 PROCEDURE — G8427 DOCREV CUR MEDS BY ELIG CLIN: HCPCS

## 2022-10-05 PROCEDURE — G8417 CALC BMI ABV UP PARAM F/U: HCPCS

## 2022-10-05 PROCEDURE — G8400 PT W/DXA NO RESULTS DOC: HCPCS

## 2022-10-05 PROCEDURE — 3017F COLORECTAL CA SCREEN DOC REV: CPT

## 2022-10-05 PROCEDURE — 1090F PRES/ABSN URINE INCON ASSESS: CPT

## 2022-10-05 RX ORDER — NYSTATIN 100000 [USP'U]/G
POWDER TOPICAL 3 TIMES DAILY PRN
Qty: 60 G | Refills: 1 | Status: SHIPPED | OUTPATIENT
Start: 2022-10-05

## 2022-10-05 NOTE — PROGRESS NOTES
Rash under breast last week  Mostly right sided  Examination  Blood pressure 121/72, pulse 78, temperature 97.7 °F (36.5 °C), resp. rate 18, height 5' 4\" (1.626 m), weight 218 lb (98.9 kg), SpO2 93 %. Erythematous patchy  A/P  Candidiasis  Mycostatin powder  Observe  Attending Physician Statement  I have discussed the case, including pertinent history and exam findings with the resident. I agree with the documented assessment and plan.

## 2022-10-05 NOTE — PROGRESS NOTES
[facility]  Department of Family Medicine  Family Medicine Residency Program      Patient: Carmenza Batista 67 y.o. female     Date of Service: 10/5/22      Chief complaint:   Chief Complaint   Patient presents with    Follow-up     Pt states she left ED because the nurses were rude and the wait was too long. She states she has a yeast rash under her breast. She said she had SOB yesterday but not anymore. HISTORY OF PRESENTING ILLNESS     67 y.o. female presented to the clinic today for complaints of rash. Rash started a week ago under right breast, then spread to left breast.  Red, painful, burning, itching rash. Scratching seems to have exacerbated the rash. Has had painful right breast for past week or 2. Cornstarch did not improve. Has been using Drift into water, has calmed the areas. Last mammogram was 2010. Sister passed away due to breast cancer. Patient has history of? Lymphoma. Follows at [facility] and has appt in December.       Health Maintenance:  Health Maintenance Due   Topic Date Due    COVID-19 Vaccine (1) Never done    Diabetic foot exam  Never done    Diabetic retinal exam  Never done    Hepatitis C screen  Never done    Shingles vaccine (1 of 2) Never done    DEXA (modify frequency per FRAX score)  Never done    Pneumococcal 65+ years Vaccine (2 - PPSV23 or PCV20) 09/19/2017    A1C test (Diabetic or Prediabetic)  06/15/2022    Flu vaccine (1) 08/01/2022    Breast cancer screen  08/31/2022     Allergies:    Protamine, Zosyn [piperacillin sod-tazobactam so], Ciprofloxacin, Hydrocodone-acetaminophen, Oxycodone-acetaminophen, and Vancomycin  Past Medical History:      Diagnosis Date    A-fib ([address]     ARF (acute renal failure) (HCC)     Cancer (HCC)     lymphoma    Cardiac dysfunction     Diabetes mellitus ([facility]     Diverticulitis     History of deep vein thrombosis 11/03/2016    Hx of blood clots     Poor venous access 11/04/2016    S/P AVR     S/P IVC filter 2016    Stage 3b chronic kidney disease (HCC)     Traumatic seroma of left thigh ([address] 2016    Ventilator dependent (HCC)      Past Surgical History:        Procedure Laterality Date    AORTIC VALVE REPLACEMENT      CARDIAC SURGERY      \"homograph\" valve replacement    COLONOSCOPY      ENDOSCOPY, COLON, DIAGNOSTIC      GASTROSTOMY TUBE PLACEMENT  2016    THORACENTESIS  10/14/2016    VENA CAVA FILTER PLACEMENT Right 10/14/2016     Social History:   Social History     Socioeconomic History    Marital status:      Spouse name: Not on file    Number of children: Not on file    Years of education: Not on file    Highest education level: Not on file   Occupational History    Not on file   Tobacco Use    Smoking status: Never    Smokeless tobacco: Never   Substance and Sexual Activity    Alcohol use: No    Drug use: No    Sexual activity: Not on file   Other Topics Concern    Not on file   Social History Narrative    Not on file     Social Determinants of Health     Financial Resource Strain: Low Risk     Difficulty of Paying Living Expenses: Not hard at all   Food Insecurity: No Food Insecurity    Worried About Running Out of Food in the Last Year: Never true    [address] [place] [address] Place of Food in the Last Year: Never true   Transportation Needs: Not on file   Physical Activity: Sufficiently Active    Days of Exercise per Week: 7 days    Minutes of Exercise per Session: 30 min   Stress: Not on file   Social Connections: Not on file   Intimate Partner Violence: Not on file   Housing Stability: Not on file      Family History:       Problem Relation Age of Onset    Cancer Father         dies age 71 of bowel obstruction.  h/o enlarged heart    Cancer Sister         breast cancer  at 39    Diabetes Sister     Other Brother         kidney failure    Heart Failure Brother     Cancer Maternal Grandmother         colon    Cancer Paternal Grandmother      Review of Systems:   Review of Systems   Constitutional: Negative for appetite change and fatigue. HENT:  Negative for sore throat and trouble swallowing. Respiratory:  Negative for cough and shortness of breath. Cardiovascular:  Negative for chest pain, palpitations and leg swelling. Gastrointestinal:  Negative for abdominal pain, constipation, diarrhea, nausea and vomiting. Genitourinary:  Negative for difficulty urinating and dysuria. Musculoskeletal:  Negative for arthralgias and myalgias. Skin:  Positive for rash. Negative for color change. Neurological:  Negative for dizziness and headaches. Psychiatric/Behavioral:  Negative for confusion and decreased concentration. PHYSICAL EXAM   Vitals: /72   Pulse 78   Temp 97.7 °F (36.5 °C)   Resp 18   Ht 5' 4\" (1.626 m)   Wt 218 lb (98.9 kg)   SpO2 93%   BMI 37.42 kg/m²     Physical Exam  Constitutional:       Appearance: Normal appearance. HENT:      Head: Normocephalic and atraumatic. Mouth/Throat:      Mouth: Mucous membranes are moist.   Eyes:      Extraocular Movements: Extraocular movements intact. Conjunctiva/sclera: Conjunctivae normal.   Cardiovascular:      Rate and Rhythm: Normal rate and regular rhythm. Pulses: Normal pulses. Heart sounds: Normal heart sounds. No murmur heard. Pulmonary:      Effort: Pulmonary effort is normal.      Breath sounds: No stridor. No wheezing. Abdominal:      General: Abdomen is flat. Bowel sounds are normal.      Palpations: Abdomen is soft. Tenderness: There is no abdominal tenderness. Musculoskeletal:         General: No swelling. Normal range of motion. Cervical back: Normal range of motion and neck supple. Skin:     General: Skin is warm and dry. Findings: Rash (Erythematous patch with satellite lesions seen under right breast, milder under left breast fold. Picture attached.) present. Neurological:      General: No focal deficit present.       Mental Status: She is alert and oriented to person, place, and time. Psychiatric:         Mood and Affect: Mood normal.         Behavior: Behavior normal.           ASSESSMENT AND PLAN     1. Candidiasis of breast  - Encouraged to keep area dry  - nystatin (MYCOSTATIN) 962848 UNIT/GM powder; Apply topically 3 times daily as needed (rash)  Dispense: 60 g; Refill: 1      Counseled regarding above diagnosis, including possible risks and complications, especially if left uncontrolled. Counseled regarding the possible side effects, risks, benefits and alternatives to treatment; patient and/or guardian verbalizes understanding, agrees, feels comfortable with and wishes to proceed with above treatment plan. Call or go to ED immediately if symptoms worsen or persist. Advised patient to call with any new medication issues, and, as applicable, read all Rx info from pharmacy to assure aware of all possible risks and side effects of medication before taking. Patient and/or guardian given opportunity to ask questions/raise concerns. The patient verbalized comfort and understanding of instructions. I encourage further reading and education about your health conditions. Information on many health conditions is provided by the American Academy of Family Physicians: https://familydoctor. org/  Please bring any questions to me at your next visit.     Current Outpatient Medications   Medication Sig Dispense Refill    nystatin (MYCOSTATIN) 985000 UNIT/GM powder Apply topically 3 times daily as needed (rash) 60 g 1    furosemide (LASIX) 40 MG tablet Take 1 tablet by mouth nightly 30 tablet 1    albuterol sulfate HFA (PROVENTIL;VENTOLIN;PROAIR) 108 (90 Base) MCG/ACT inhaler Inhale 2 puffs into the lungs every 4 hours as needed for Wheezing or Shortness of Breath      atorvastatin (LIPITOR) 40 MG tablet Take 40 mg by mouth nightly      OXYGEN Inhale 1 L/min into the lungs as needed (WHEEZING/SHORTNESS OF BREATH)      Multiple Vitamins-Minerals (THERAPEUTIC MULTIVITAMIN-MINERALS) tablet Take 1 tablet by mouth in the morning. polyvinyl alcohol (LIQUIFILM TEARS) 1.4 % ophthalmic solution Place 1 drop into both eyes as needed for Dry Eyes      Ergocalciferol (DRISDOL) 200 MCG/ML drops Take 0.5 mLs by mouth in the morning. 10 mL 0     No current facility-administered medications for this visit. Facility-Administered Medications Ordered in Other Visits   Medication Dose Route Frequency Provider Last Rate Last Admin    morphine (PF) injection 2 mg  2 mg IntraVENous Q5 Min PRN Santiago Segal DO            Return to Office: No follow-ups on file. This document may have been prepared at least partially through the use of voice recognition software. Although effort is taken to assure the accuracy of this document, it is possible that grammatical, syntax,  or spelling errors may occur.     Rosa Srivastava MD  Family Medicine Resident PGY-2  10/14/2022

## 2022-10-14 ASSESSMENT — ENCOUNTER SYMPTOMS
NAUSEA: 0
TROUBLE SWALLOWING: 0
DIARRHEA: 0
CONSTIPATION: 0
SHORTNESS OF BREATH: 0
COLOR CHANGE: 0
ABDOMINAL PAIN: 0
VOMITING: 0
COUGH: 0
SORE THROAT: 0

## 2022-10-31 RX ORDER — FUROSEMIDE 40 MG/1
40 TABLET ORAL NIGHTLY
Qty: 30 TABLET | Refills: 2 | Status: SHIPPED | OUTPATIENT
Start: 2022-10-31

## 2022-11-11 ENCOUNTER — OFFICE VISIT (OUTPATIENT)
Dept: PRIMARY CARE CLINIC | Age: 72
End: 2022-11-11
Payer: MEDICARE

## 2022-11-11 VITALS
BODY MASS INDEX: 37.73 KG/M2 | HEIGHT: 64 IN | SYSTOLIC BLOOD PRESSURE: 111 MMHG | DIASTOLIC BLOOD PRESSURE: 74 MMHG | WEIGHT: 221 LBS | HEART RATE: 71 BPM | OXYGEN SATURATION: 92 % | TEMPERATURE: 97.2 F

## 2022-11-11 DIAGNOSIS — B96.89 ACUTE BACTERIAL SINUSITIS: Primary | ICD-10-CM

## 2022-11-11 DIAGNOSIS — J01.90 ACUTE BACTERIAL SINUSITIS: Primary | ICD-10-CM

## 2022-11-11 PROCEDURE — G8484 FLU IMMUNIZE NO ADMIN: HCPCS | Performed by: NURSE PRACTITIONER

## 2022-11-11 PROCEDURE — 1123F ACP DISCUSS/DSCN MKR DOCD: CPT | Performed by: NURSE PRACTITIONER

## 2022-11-11 PROCEDURE — 1090F PRES/ABSN URINE INCON ASSESS: CPT | Performed by: NURSE PRACTITIONER

## 2022-11-11 PROCEDURE — G8400 PT W/DXA NO RESULTS DOC: HCPCS | Performed by: NURSE PRACTITIONER

## 2022-11-11 PROCEDURE — 1036F TOBACCO NON-USER: CPT | Performed by: NURSE PRACTITIONER

## 2022-11-11 PROCEDURE — 3074F SYST BP LT 130 MM HG: CPT | Performed by: NURSE PRACTITIONER

## 2022-11-11 PROCEDURE — 3017F COLORECTAL CA SCREEN DOC REV: CPT | Performed by: NURSE PRACTITIONER

## 2022-11-11 PROCEDURE — G8427 DOCREV CUR MEDS BY ELIG CLIN: HCPCS | Performed by: NURSE PRACTITIONER

## 2022-11-11 PROCEDURE — 99213 OFFICE O/P EST LOW 20 MIN: CPT | Performed by: NURSE PRACTITIONER

## 2022-11-11 PROCEDURE — G8417 CALC BMI ABV UP PARAM F/U: HCPCS | Performed by: NURSE PRACTITIONER

## 2022-11-11 PROCEDURE — 3078F DIAST BP <80 MM HG: CPT | Performed by: NURSE PRACTITIONER

## 2022-11-11 RX ORDER — LOSARTAN POTASSIUM 25 MG/1
12.5 TABLET ORAL DAILY
COMMUNITY
Start: 2022-10-27 | End: 2023-01-25

## 2022-11-11 RX ORDER — LOSARTAN POTASSIUM 25 MG/1
12.5 TABLET ORAL DAILY
COMMUNITY
Start: 2022-10-27 | End: 2022-11-11

## 2022-11-11 RX ORDER — DOXYCYCLINE HYCLATE 100 MG
100 TABLET ORAL 2 TIMES DAILY
Qty: 20 TABLET | Refills: 0 | Status: SHIPPED | OUTPATIENT
Start: 2022-11-11 | End: 2022-11-21

## 2022-11-11 NOTE — PROGRESS NOTES
Chief Complaint:   Cough (Productive green mucus, onset 4 days ago)      History of Present Illness   Source of history provided by:  patient. Carmenza Batista is a 67 y.o. old female with a past medical history of:   Past Medical History:   Diagnosis Date    A-fib [facility])     ARF (acute renal failure) ([facility]     Cancer ([facility]     lymphoma    Cardiac dysfunction     Diabetes mellitus ([facility]     Diverticulitis     History of deep vein thrombosis 11/03/2016    Hx of blood clots     Poor venous access 11/04/2016    S/P AVR     S/P IVC filter 11/03/2016    Stage 3b chronic kidney disease ([facility]     Traumatic seroma of left thigh ([facility] [address] 11/16/2016    [facility]        Pt  presents to the ready care with a cough/congestion for the past 4 days. States the cough is  productive with greenish sputum. No fever noted. Denies any N/V/D, abdominal pain, CP, progressive SOB, dizziness, or lethargy. Pt has a h/o Pneumonia/Chronic Bronchitis      ROS    Unless otherwise stated in this report or unable to obtain because of the patient's clinical or mental status as evidenced by the medical record, this patients's positive and negative responses for Review of Systems, constitutional, psych, eyes, ENT, cardiovascular, respiratory, gastrointestinal, neurological, genitourinary, musculoskeletal, integument systems and systems related to the presenting problem are either stated in the preceding or were not pertinent or were negative for the symptoms and/or complaints related to the medical problem. Past Surgical History:  has a past surgical history that includes Cardiac surgery; Gastrostomy tube placement (09/26/2016); thoracentesis (10/14/2016); Colonoscopy; Endoscopy, colon, diagnostic; Aortic valve replacement; and Vena Cava Filter Placement (Right, 10/14/2016). Social History:  reports that she has never smoked.  She has never used smokeless tobacco. She reports that she does not drink alcohol and does not use drugs. Family History: family history includes Cancer in her father, maternal grandmother, paternal grandmother, and sister; Diabetes in her sister; Heart Failure in her brother; Other in her brother. Allergies: Protamine, Wasp venom, Zosyn [piperacillin sod-tazobactam so], Ciprofloxacin, Hydrocodone-acetaminophen, Oxycodone-acetaminophen, Piperacillin-tazobactam in dex, Sweet potato, and Vancomycin    Physical Exam         VS:  Ht 5' 4\" (1.626 m)   Wt 221 lb (100.2 kg)   BMI 37.93 kg/m²    Oxygen Saturation Interpretation: Normal.    Constitutional:  Alert, development consistent with age. Ears:  External Ears: Normal bilateral pinna. TM's & External Canals: TM's normal bilaterally without perforation. Canals without erythema or drainage. Nose:   There is no obvious septal defect. Diffuse redness/edema  Mouth:  Moist bucca mucosa and normal tongue. Throat: Mild posterior pharyngeal erythema without exudates or lesions. Neck:  Supple. There is no obvious adenopathy or neck tenderness. Lungs:   Breath sounds: Normal chest expansion and breath sounds noted throughout. No wheezes, rales, or rhonchi noted. Heart:  Regular rate and rhythm, normal heart sounds, without pathological murmurs, ectopy, gallops, or rubs. Skin:  Normal turgor. Warm, dry, without visible rash. Neurological:  Oriented. Motor functions intact. Lab / Imaging Results   (All laboratory and radiology results have been personally reviewed by myself)  Labs:  No results found for this visit on 11/11/22. Imaging: All Radiology results interpreted by Radiologist unless otherwise noted. No orders to display         Assessment / Plan     Impression(s):  1.  Acute bacterial sinusitis      Disposition:  Disposition: Start Doxycycline as ordered, stay well hydrated, return to walk in care w/any worsening or concerning medical issues

## 2023-01-05 ENCOUNTER — APPOINTMENT (OUTPATIENT)
Dept: GENERAL RADIOLOGY | Age: 73
End: 2023-01-05
Payer: MEDICARE

## 2023-01-05 ENCOUNTER — TELEPHONE (OUTPATIENT)
Dept: FAMILY MEDICINE CLINIC | Age: 73
End: 2023-01-05

## 2023-01-05 VITALS
WEIGHT: 217 LBS | BODY MASS INDEX: 37.05 KG/M2 | DIASTOLIC BLOOD PRESSURE: 85 MMHG | OXYGEN SATURATION: 96 % | SYSTOLIC BLOOD PRESSURE: 168 MMHG | TEMPERATURE: 98.4 F | RESPIRATION RATE: 16 BRPM | HEIGHT: 64 IN | HEART RATE: 79 BPM

## 2023-01-05 LAB
ALBUMIN SERPL-MCNC: 4 G/DL (ref 3.5–5.2)
ALP BLD-CCNC: 65 U/L (ref 35–104)
ALT SERPL-CCNC: 18 U/L (ref 0–32)
ANION GAP SERPL CALCULATED.3IONS-SCNC: 11 MMOL/L (ref 7–16)
AST SERPL-CCNC: 23 U/L (ref 0–31)
BASOPHILS ABSOLUTE: 0.04 E9/L (ref 0–0.2)
BASOPHILS RELATIVE PERCENT: 0.7 % (ref 0–2)
BILIRUB SERPL-MCNC: 0.2 MG/DL (ref 0–1.2)
BUN BLDV-MCNC: 30 MG/DL (ref 6–23)
CALCIUM SERPL-MCNC: 9.6 MG/DL (ref 8.6–10.2)
CHLORIDE BLD-SCNC: 103 MMOL/L (ref 98–107)
CO2: 25 MMOL/L (ref 22–29)
CREAT SERPL-MCNC: 1.6 MG/DL (ref 0.5–1)
EOSINOPHILS ABSOLUTE: 0.46 E9/L (ref 0.05–0.5)
EOSINOPHILS RELATIVE PERCENT: 7.5 % (ref 0–6)
GFR SERPL CREATININE-BSD FRML MDRD: 34 ML/MIN/1.73
GLUCOSE BLD-MCNC: 106 MG/DL (ref 74–99)
HCT VFR BLD CALC: 43.4 % (ref 34–48)
HEMOGLOBIN: 14.1 G/DL (ref 11.5–15.5)
IMMATURE GRANULOCYTES #: 0.01 E9/L
IMMATURE GRANULOCYTES %: 0.2 % (ref 0–5)
INFLUENZA A BY PCR: NOT DETECTED
INFLUENZA B BY PCR: NOT DETECTED
LYMPHOCYTES ABSOLUTE: 1.89 E9/L (ref 1.5–4)
LYMPHOCYTES RELATIVE PERCENT: 30.7 % (ref 20–42)
MCH RBC QN AUTO: 30.3 PG (ref 26–35)
MCHC RBC AUTO-ENTMCNC: 32.5 % (ref 32–34.5)
MCV RBC AUTO: 93.3 FL (ref 80–99.9)
MONOCYTES ABSOLUTE: 0.56 E9/L (ref 0.1–0.95)
MONOCYTES RELATIVE PERCENT: 9.1 % (ref 2–12)
NEUTROPHILS ABSOLUTE: 3.19 E9/L (ref 1.8–7.3)
NEUTROPHILS RELATIVE PERCENT: 51.8 % (ref 43–80)
PDW BLD-RTO: 13.8 FL (ref 11.5–15)
PLATELET # BLD: 173 E9/L (ref 130–450)
PMV BLD AUTO: 9.8 FL (ref 7–12)
POTASSIUM REFLEX MAGNESIUM: 4.2 MMOL/L (ref 3.5–5)
PRO-BNP: 256 PG/ML (ref 0–125)
RBC # BLD: 4.65 E12/L (ref 3.5–5.5)
SARS-COV-2, NAAT: NOT DETECTED
SODIUM BLD-SCNC: 139 MMOL/L (ref 132–146)
TOTAL PROTEIN: 7.4 G/DL (ref 6.4–8.3)
TROPONIN, HIGH SENSITIVITY: 21 NG/L (ref 0–9)
WBC # BLD: 6.2 E9/L (ref 4.5–11.5)

## 2023-01-05 PROCEDURE — 93005 ELECTROCARDIOGRAM TRACING: CPT

## 2023-01-05 PROCEDURE — 87502 INFLUENZA DNA AMP PROBE: CPT

## 2023-01-05 PROCEDURE — 99285 EMERGENCY DEPT VISIT HI MDM: CPT

## 2023-01-05 PROCEDURE — 87635 SARS-COV-2 COVID-19 AMP PRB: CPT

## 2023-01-05 PROCEDURE — 71046 X-RAY EXAM CHEST 2 VIEWS: CPT

## 2023-01-05 PROCEDURE — 80053 COMPREHEN METABOLIC PANEL: CPT

## 2023-01-05 PROCEDURE — 83880 ASSAY OF NATRIURETIC PEPTIDE: CPT

## 2023-01-05 PROCEDURE — 85025 COMPLETE CBC W/AUTO DIFF WBC: CPT

## 2023-01-05 PROCEDURE — 84484 ASSAY OF TROPONIN QUANT: CPT

## 2023-01-05 ASSESSMENT — PAIN - FUNCTIONAL ASSESSMENT: PAIN_FUNCTIONAL_ASSESSMENT: NONE - DENIES PAIN

## 2023-01-05 NOTE — TELEPHONE ENCOUNTER
ECC transferred call,pt states she has been tired,congested and coughing up phlegm and has had oxygen levels as low as 86%. Pt was requesting to have a portable oxygen unit ordered. Pt informed she needed to go to ED. Pt states she would.

## 2023-01-06 ENCOUNTER — HOSPITAL ENCOUNTER (EMERGENCY)
Age: 73
Discharge: HOME | End: 2023-01-06
Attending: STUDENT IN AN ORGANIZED HEALTH CARE EDUCATION/TRAINING PROGRAM
Payer: MEDICARE

## 2023-01-06 DIAGNOSIS — J06.9 ACUTE UPPER RESPIRATORY INFECTION: Primary | ICD-10-CM

## 2023-01-06 LAB
EKG ATRIAL RATE: 76 BPM
EKG P-R INTERVAL: 108 MS
EKG Q-T INTERVAL: 448 MS
EKG QRS DURATION: 176 MS
EKG QTC CALCULATION (BAZETT): 504 MS
EKG R AXIS: 19 DEGREES
EKG T AXIS: 47 DEGREES
EKG VENTRICULAR RATE: 76 BPM
TROPONIN, HIGH SENSITIVITY: 20 NG/L (ref 0–9)

## 2023-01-06 PROCEDURE — 36415 COLL VENOUS BLD VENIPUNCTURE: CPT

## 2023-01-06 PROCEDURE — 93010 ELECTROCARDIOGRAM REPORT: CPT | Performed by: INTERNAL MEDICINE

## 2023-01-06 PROCEDURE — 96374 THER/PROPH/DIAG INJ IV PUSH: CPT

## 2023-01-06 PROCEDURE — 84484 ASSAY OF TROPONIN QUANT: CPT

## 2023-01-06 PROCEDURE — 94640 AIRWAY INHALATION TREATMENT: CPT

## 2023-01-06 PROCEDURE — 6370000000 HC RX 637 (ALT 250 FOR IP): Performed by: STUDENT IN AN ORGANIZED HEALTH CARE EDUCATION/TRAINING PROGRAM

## 2023-01-06 PROCEDURE — 6360000002 HC RX W HCPCS: Performed by: STUDENT IN AN ORGANIZED HEALTH CARE EDUCATION/TRAINING PROGRAM

## 2023-01-06 PROCEDURE — 94664 DEMO&/EVAL PT USE INHALER: CPT

## 2023-01-06 RX ORDER — IPRATROPIUM BROMIDE AND ALBUTEROL SULFATE 2.5; .5 MG/3ML; MG/3ML
1 SOLUTION RESPIRATORY (INHALATION)
Status: COMPLETED | OUTPATIENT
Start: 2023-01-06 | End: 2023-01-06

## 2023-01-06 RX ORDER — METHYLPREDNISOLONE SODIUM SUCCINATE 125 MG/2ML
125 INJECTION, POWDER, LYOPHILIZED, FOR SOLUTION INTRAMUSCULAR; INTRAVENOUS ONCE
Status: COMPLETED | OUTPATIENT
Start: 2023-01-06 | End: 2023-01-06

## 2023-01-06 RX ORDER — ALBUTEROL SULFATE 90 UG/1
2 AEROSOL, METERED RESPIRATORY (INHALATION) 4 TIMES DAILY PRN
Qty: 54 G | Refills: 1 | Status: SHIPPED | OUTPATIENT
Start: 2023-01-06

## 2023-01-06 RX ORDER — PREDNISONE 20 MG/1
50 TABLET ORAL DAILY
Qty: 13 TABLET | Refills: 0 | Status: SHIPPED | OUTPATIENT
Start: 2023-01-06 | End: 2023-01-11

## 2023-01-06 RX ORDER — DOXYCYCLINE HYCLATE 100 MG
100 TABLET ORAL 2 TIMES DAILY
Qty: 14 TABLET | Refills: 0 | Status: SHIPPED | OUTPATIENT
Start: 2023-01-06 | End: 2023-01-13

## 2023-01-06 RX ADMIN — IPRATROPIUM BROMIDE AND ALBUTEROL SULFATE 1 AMPULE: .5; 2.5 SOLUTION RESPIRATORY (INHALATION) at 03:06

## 2023-01-06 RX ADMIN — IPRATROPIUM BROMIDE AND ALBUTEROL SULFATE 1 AMPULE: .5; 2.5 SOLUTION RESPIRATORY (INHALATION) at 03:08

## 2023-01-06 RX ADMIN — METHYLPREDNISOLONE SODIUM SUCCINATE 125 MG: 125 INJECTION, POWDER, FOR SOLUTION INTRAMUSCULAR; INTRAVENOUS at 02:59

## 2023-01-06 RX ADMIN — IPRATROPIUM BROMIDE AND ALBUTEROL SULFATE 1 AMPULE: .5; 2.5 SOLUTION RESPIRATORY (INHALATION) at 03:07

## 2023-01-06 NOTE — ED NOTES
The following labs were labeled with appropriate pt sticker and tubed to lab:     [] Blue     [] Lavender   [] on ice  [x] Green/yellow  [] Green/black [] on ice  [] Mckenzie Kranthi  [] on ice  [] Yellow  [] Red  [] Type/ Screen  [] ABG  [] VBG    [] COVID-19 swab    [] Rapid  [] PCR  [] Flu swab  [] Peds Viral Panel     [] Urine Sample  [] Fecal Sample  [] Pelvic Cultures  [] Blood Cultures  [] X 2  [] STREP Cultures         Danae Preston RN  01/06/23 0487

## 2023-01-06 NOTE — ED NOTES
Department of Emergency Medicine  FIRST PROVIDER TRIAGE NOTE             Independent MLP           1/5/23  7:57 PM EST    Date of Encounter: 1/5/23   MRN: [id number]      HPI: Lauren Hoffmann is a 67 y.o. female who presents to the ED for Shortness of Breath (Low pulse ox, 89% RA. Congestion x 4 days. Cough. wheezing)  Per patient she has been having nasal congestion and a cough for the past 4 days. Patient states that today her oxygen level was low at 89%. Patient states that she does have oxygen at home that is only ordered as needed. She states she called her PCP and requested a continuous oxygen prescription, but he stated that due to her low oxygen level she needed to come to the emergency department. She denies chest pain. ROS: Negative for cp, abd pain, or back pain. PE: Gen Appearance/Constitutional: alert  CV: regular rate  Pulm: Expiratory wheezes noted to the bilateral lung bases     Initial Plan of Care: All treatment areas with department are currently occupied. Plan to order/Initiate the following while awaiting opening in ED: labs, EKG, and imaging studies.   Initiate Treatment-Testing, Proceed toTreatment Area When Bed Available for ED Attending/MLP to Continue Care    Electronically signed by ROOSEVELT Bernal CNP   DD: 1/5/23       ROOSEVELT Bernal CNP  01/05/23 1959

## 2023-01-10 ENCOUNTER — OFFICE VISIT (OUTPATIENT)
Dept: FAMILY MEDICINE CLINIC | Age: 73
End: 2023-01-10

## 2023-01-10 VITALS
OXYGEN SATURATION: 91 % | RESPIRATION RATE: 18 BRPM | TEMPERATURE: 98.1 F | SYSTOLIC BLOOD PRESSURE: 137 MMHG | WEIGHT: 217 LBS | DIASTOLIC BLOOD PRESSURE: 73 MMHG | HEIGHT: 64 IN | BODY MASS INDEX: 37.05 KG/M2 | HEART RATE: 85 BPM

## 2023-01-10 DIAGNOSIS — J44.9 CHRONIC OBSTRUCTIVE PULMONARY DISEASE, UNSPECIFIED COPD TYPE (HCC): Primary | ICD-10-CM

## 2023-01-10 ASSESSMENT — PATIENT HEALTH QUESTIONNAIRE - PHQ9
SUM OF ALL RESPONSES TO PHQ QUESTIONS 1-9: 0
SUM OF ALL RESPONSES TO PHQ9 QUESTIONS 1 & 2: 0
2. FEELING DOWN, DEPRESSED OR HOPELESS: 0
SUM OF ALL RESPONSES TO PHQ QUESTIONS 1-9: 0
SUM OF ALL RESPONSES TO PHQ QUESTIONS 1-9: 0
1. LITTLE INTEREST OR PLEASURE IN DOING THINGS: 0
SUM OF ALL RESPONSES TO PHQ QUESTIONS 1-9: 0

## 2023-01-10 NOTE — PROGRESS NOTES
[facility]  Precepting Note    Subjective:  ERFU  SOB  O2 sats in the 80s in ED. Suspected resp infection. DC home. Improved today  Steroids and doxy at ED DC. She is finishing. She didn't take the pred taper, concern for harm over benefit. Lung exam normal today. She requests portable oxygen tank. She has compressor at home. Has COPD. Sees pulm. ROS otherwise negative     Past medical, surgical, family and social history were reviewed, non-contributory, and unchanged unless otherwise stated. Objective:    /73   Pulse 85   Temp 98.1 °F (36.7 °C)   Resp 18   Ht 5' 4\" (1.626 m)   Wt 217 lb (98.4 kg)   SpO2 91%   BMI 37.25 kg/m²     Exam is as noted by resident with the following changes, additions or corrections:    General:  NAD; alert & oriented x 3   Heart:  RRR, no murmurs, gallops, or rubs. Lungs:  CTA bilaterally, no wheeze, rales or rhonchi  Abd: bowel sounds present, nontender, nondistended, no masses  Extrem:  No clubbing, cyanosis, or edema    Assessment/Plan:  URI with background of COPD, chronically oxygen dependent with 4 L via nasal cannula lately. Has home oxygen concentrator. Requesting portable oxygen which is reasonable. The saturation was in the office today to 85% on 5-minute walk test on room air. Recovering from URI appropriately. Complete doxycycline, prednisone taper, continue albuterol. Attending Physician Statement  I have reviewed the chart, including any radiology or labs. I have discussed the case, including pertinent history and exam findings with the resident. I agree with the assessment, plan and orders as documented by the resident. Please refer to the resident note for additional information.       Electronically signed by Gema Clark MD on 1/10/2023 at 11:22 AM

## 2023-01-10 NOTE — PROGRESS NOTES
Subjective: Tuyet Rojas is a 67 y.o. female with chief complaint of ED Follow-up (URI 1/6/23 @ ED [facility] chronic bronchitis - wants portable O2 - /O2 dropping fast since she has the URI )      HPI:  ER visit [1/6] for SOB, given duoneb and sol-medrol 125 mg, covid, flu neg, hemo stable, dc'd home. Given albuterol 0.5% nebs q6h  Albuterol 2 puffs QID  Pred taper - did not take  Doxy 100 BID x 7 days (D5/7)  Home O2 - currently 4L NC home. Desats on ambulation. Needs portable O2      5 min walk test - 85%     ROS:  Review of Systems   Constitutional:  Negative for chills and fever. HENT:  Negative for congestion, rhinorrhea and sore throat. Eyes:  Negative for pain and visual disturbance. Respiratory:  Positive for shortness of breath (on ambulation). Negative for wheezing. Cardiovascular:  Negative for chest pain and palpitations. Gastrointestinal:  Negative for diarrhea and nausea. Genitourinary:  Negative for dysuria and hematuria. Musculoskeletal:  Negative for arthralgias and myalgias. Skin:  Negative for rash. Neurological:  Negative for dizziness and headaches. Objective:  Vitals:    01/10/23 1052   BP: 137/73   Pulse: 85   Resp: 18   Temp: 98.1 °F (36.7 °C)   SpO2: 91%   Weight: 217 lb (98.4 kg)   Height: 5' 4\" (1.626 m)     Physical Exam  Vitals and nursing note reviewed. Constitutional:       General: She is not in acute distress. Appearance: Normal appearance. HENT:      Head: Normocephalic and atraumatic. Eyes:      General:         Right eye: No discharge. Left eye: No discharge. Cardiovascular:      Rate and Rhythm: Normal rate and regular rhythm. Heart sounds: No murmur heard. Pulmonary:      Breath sounds: Normal breath sounds. No wheezing. Comments: Increased effort - Desat 85% on walk test  Abdominal:      General: Bowel sounds are normal.      Palpations: Abdomen is soft. Tenderness: There is no abdominal tenderness. Musculoskeletal:         General: No swelling. Normal range of motion. Right lower leg: No edema. Left lower leg: No edema. Skin:     General: Skin is warm and dry. Neurological:      General: No focal deficit present. Mental Status: She is oriented to person, place, and time. Assessment/Plan:   Diagnosis Orders   1. Chronic obstructive pulmonary disease, unspecified COPD type ([facility] [address]  DME Order for Home Oxygen as OP               - Recent ER visit for Acute URI, sent home in stable condition            - Relatively stable, however concerned with breathing effort on ambulation            - Has O2 at home, requesting portable O2            - 5 min walk test shows desat <85%            - DME order            - RTC if sxs worsen       Counseled regarding above diagnosis, including possible risks and complications,  especially if left uncontrolled. Counseled regarding the possible side effects, risks, benefits and alternatives to treatment;patient and/or guardian verbalizes understanding, agrees, feels comfortable with and wishes to proceed with above treatment plan. Call or go to ED immediately if symptoms worsen or persist. Advised patient to call with any new medication issues, and, as applicable, read all Rx info from pharmacy to assure aware of all possible risks and side effects of medicationbefore taking. Patient and/or guardian given opportunity to ask questions/raise concerns. The patient verbalized comfort and understanding ofinstructions. Reviewed age and gender appropriate health screening exams and vaccinations. Advised patient regarding importance of keeping up with recommended health maintenance and to schedule as soon as possible if overdue, as this is important in assessing for undiagnosed pathology, especially cancer, as well as protecting against potentially harmful/life threatening disease.       I encourage further reading and education about your health conditions .Information on many healthconditions is provided by the American Academy of Family Physicians: https://familydoctor. org/  Please bring any questions to me at your next visit. This document may have been prepared at least partially through the use of voice recognition software. Although effort is taken to assure the accuracy of this document, it is possible that grammatical, syntax,  or spelling errors may occur. Return in about 1 month (around 2/10/2023) for dr. Kaylee Benítez.     Electronically signed by Mario Bui MD on 1/10/23 at 10:22 AM EST

## 2023-01-11 ASSESSMENT — ENCOUNTER SYMPTOMS
DIARRHEA: 0
SORE THROAT: 0
SHORTNESS OF BREATH: 1
NAUSEA: 0
RHINORRHEA: 0
WHEEZING: 0
EYE PAIN: 0

## 2023-02-10 ENCOUNTER — OFFICE VISIT (OUTPATIENT)
Dept: FAMILY MEDICINE CLINIC | Age: 73
End: 2023-02-10
Payer: MEDICARE

## 2023-02-10 VITALS
HEART RATE: 76 BPM | SYSTOLIC BLOOD PRESSURE: 139 MMHG | OXYGEN SATURATION: 93 % | DIASTOLIC BLOOD PRESSURE: 87 MMHG | WEIGHT: 223 LBS | RESPIRATION RATE: 14 BRPM | TEMPERATURE: 98 F | BODY MASS INDEX: 38.07 KG/M2 | HEIGHT: 64 IN

## 2023-02-10 DIAGNOSIS — E11.69 DIABETES MELLITUS TYPE 2 IN OBESE (HCC): ICD-10-CM

## 2023-02-10 DIAGNOSIS — N18.32 STAGE 3B CHRONIC KIDNEY DISEASE (HCC): ICD-10-CM

## 2023-02-10 DIAGNOSIS — J44.9 CHRONIC OBSTRUCTIVE PULMONARY DISEASE, UNSPECIFIED COPD TYPE (HCC): ICD-10-CM

## 2023-02-10 DIAGNOSIS — E66.9 DIABETES MELLITUS TYPE 2 IN OBESE (HCC): ICD-10-CM

## 2023-02-10 DIAGNOSIS — E66.01 SEVERE OBESITY (BMI 35.0-39.9) WITH COMORBIDITY (HCC): ICD-10-CM

## 2023-02-10 DIAGNOSIS — I10 ESSENTIAL HYPERTENSION: Primary | ICD-10-CM

## 2023-02-10 LAB — HBA1C MFR BLD: 8.5 % (ref 4–5.6)

## 2023-02-10 PROCEDURE — 1123F ACP DISCUSS/DSCN MKR DOCD: CPT

## 2023-02-10 PROCEDURE — 1090F PRES/ABSN URINE INCON ASSESS: CPT

## 2023-02-10 PROCEDURE — 2022F DILAT RTA XM EVC RTNOPTHY: CPT

## 2023-02-10 PROCEDURE — 99214 OFFICE O/P EST MOD 30 MIN: CPT

## 2023-02-10 PROCEDURE — 3052F HG A1C>EQUAL 8.0%<EQUAL 9.0%: CPT

## 2023-02-10 PROCEDURE — G8400 PT W/DXA NO RESULTS DOC: HCPCS

## 2023-02-10 PROCEDURE — 3023F SPIROM DOC REV: CPT

## 2023-02-10 PROCEDURE — G8484 FLU IMMUNIZE NO ADMIN: HCPCS

## 2023-02-10 PROCEDURE — 3078F DIAST BP <80 MM HG: CPT

## 2023-02-10 PROCEDURE — G8417 CALC BMI ABV UP PARAM F/U: HCPCS

## 2023-02-10 PROCEDURE — G8427 DOCREV CUR MEDS BY ELIG CLIN: HCPCS

## 2023-02-10 PROCEDURE — 3017F COLORECTAL CA SCREEN DOC REV: CPT

## 2023-02-10 PROCEDURE — 3074F SYST BP LT 130 MM HG: CPT

## 2023-02-10 PROCEDURE — 1036F TOBACCO NON-USER: CPT

## 2023-02-10 RX ORDER — ALBUTEROL SULFATE 2.5 MG/3ML
SOLUTION RESPIRATORY (INHALATION)
COMMUNITY
Start: 2023-01-06

## 2023-02-10 RX ORDER — ALBUTEROL SULFATE 90 UG/1
2 AEROSOL, METERED RESPIRATORY (INHALATION) 4 TIMES DAILY PRN
Qty: 54 G | Refills: 3 | Status: SHIPPED | OUTPATIENT
Start: 2023-02-10

## 2023-02-10 SDOH — ECONOMIC STABILITY: HOUSING INSECURITY
IN THE LAST 12 MONTHS, WAS THERE A TIME WHEN YOU DID NOT HAVE A STEADY PLACE TO SLEEP OR SLEPT IN A SHELTER (INCLUDING NOW)?: NO

## 2023-02-10 SDOH — ECONOMIC STABILITY: FOOD INSECURITY: WITHIN THE PAST 12 MONTHS, THE FOOD YOU BOUGHT JUST DIDN'T LAST AND YOU DIDN'T HAVE MONEY TO GET MORE.: PATIENT DECLINED

## 2023-02-10 SDOH — ECONOMIC STABILITY: FOOD INSECURITY: WITHIN THE PAST 12 MONTHS, YOU WORRIED THAT YOUR FOOD WOULD RUN OUT BEFORE YOU GOT MONEY TO BUY MORE.: PATIENT DECLINED

## 2023-02-10 SDOH — ECONOMIC STABILITY: INCOME INSECURITY: HOW HARD IS IT FOR YOU TO PAY FOR THE VERY BASICS LIKE FOOD, HOUSING, MEDICAL CARE, AND HEATING?: PATIENT DECLINED

## 2023-02-10 ASSESSMENT — ENCOUNTER SYMPTOMS
DIARRHEA: 0
WHEEZING: 0
NAUSEA: 0
EYE PAIN: 0
SORE THROAT: 0
RHINORRHEA: 0
SHORTNESS OF BREATH: 1

## 2023-02-10 NOTE — PROGRESS NOTES
S: 67 y.o. female with   Chief Complaint   Patient presents with    COPD    Hypertension     Losartan stopped 2 days ago due to dizziness/lethargy       Pt has home oxygen and needs portable oxygen. She follows with pulm for her COPD. Pt has HTN. She feels that the losartan may be causing dizziness so she stopped it. She was placed on losartan by nephrologist and is seeing them on Monday. O: VS:  height is 5' 4\" (1.626 m) and weight is 223 lb (101.2 kg). Her temporal temperature is 98 °F (36.7 °C). Her blood pressure is 156/92 (abnormal) and her pulse is 76. Her respiration is 14 and oxygen saturation is 93%. BP Readings from Last 3 Encounters:   02/10/23 (!) 156/92   01/10/23 137/73   01/05/23 (!) 168/85     See resident note      Impression/Plan:   1) COPD -  stable. Needs portable oxygen. 2) HTN - pt stopped meds. Will see nephrology on Monday to discuss. 3) stage 3 CKD - pt seeing nephrology as above  4) DM - Hb1c today. Health Maintenance Due   Topic Date Due    COVID-19 Vaccine (1) Never done    Diabetic foot exam  Never done    Diabetic retinal exam  Never done    Hepatitis C screen  Never done    Shingles vaccine (1 of 2) Never done    DEXA (modify frequency per FRAX score)  Never done    Pneumococcal 65+ years Vaccine (2 - PPSV23 if available, else PCV20) 09/19/2017    A1C test (Diabetic or Prediabetic)  06/15/2022    Flu vaccine (1) 08/01/2022    Breast cancer screen  08/31/2022         Attending Physician Statement  I have discussed the case, including pertinent history and exam findings with the resident. I also have seen the patient and performed key portions of the examination. I agree with the documented assessment and plan.       Elda Bruner MD

## 2023-02-10 NOTE — PROGRESS NOTES
Subjective: Leanna King is a 67 y.o. female with chief complaint of COPD and Hypertension (Losartan stopped 2 days ago due to dizziness/lethargy)    HPI:  COPD - Albuterol 2 puffs 4 times daily, albuterol nebulizer 2.5 every 6 hours, 1 L oxygen at home  Did not get portable oxygen from last encounter due to prescribing error but is still interested in getting one. Hypertension  BP Readings from Last 3 Encounters:  02/10/23 : (!) 156/92  01/10/23 : 137/73  01/05/23 : (!) 168/85     On losartan 25 mg but said was feeling dizzy, and so stopped it 3 days ago  Retook /87. Medication was prescribed by nephrology  Follows up with Nephrology this Monday        ROS:  Review of Systems   Constitutional:  Negative for chills and fever. HENT:  Negative for congestion, rhinorrhea and sore throat. Eyes:  Negative for pain and visual disturbance. Respiratory:  Positive for shortness of breath (On ambulation). Negative for wheezing. Cardiovascular:  Negative for chest pain and palpitations. Gastrointestinal:  Negative for diarrhea and nausea. Genitourinary:  Negative for dysuria and hematuria. Musculoskeletal:  Negative for arthralgias and myalgias. Skin:  Negative for rash. Neurological:  Negative for dizziness and headaches. Objective:  Vitals:    02/10/23 1304 02/10/23 1338   BP: (!) 156/92 139/87   Pulse: 76    Resp: 14    Temp: 98 °F (36.7 °C)    TempSrc: Temporal    SpO2: 93%    Weight: 223 lb (101.2 kg)    Height: 5' 4\" (1.626 m)      Physical Exam  Vitals and nursing note reviewed. Constitutional:       General: She is not in acute distress. Appearance: Normal appearance. Comments: 5 minutes walking test desaturation 87%   HENT:      Head: Normocephalic and atraumatic. Eyes:      General:         Right eye: No discharge. Left eye: No discharge. Cardiovascular:      Rate and Rhythm: Normal rate and regular rhythm.       Heart sounds: No murmur heard.  Pulmonary:      Effort: Pulmonary effort is normal.      Breath sounds: No wheezing. Abdominal:      General: Bowel sounds are normal.      Palpations: Abdomen is soft. Tenderness: There is no abdominal tenderness. Musculoskeletal:         General: No swelling. Normal range of motion. Right lower leg: No edema. Left lower leg: No edema. Skin:     General: Skin is warm and dry. Neurological:      General: No focal deficit present. Mental Status: She is oriented to person, place, and time. Assessment/Plan:   Diagnosis Orders   1. Essential hypertension                - Controlled, slight concern for medication compliance           - BP meds managed by Nephro, will defer to them           - will follow up on next visit     2. Severe obesity (BMI 35.0-39. 9) with comorbidity ([address]                 - Discussed walking more, patient does antique shows            - 5 min walk test positive, qualified for portable oxygen            - portable O2 will be prescribed      3. Diabetes mellitus type 2 in obese ([facility])  Hemoglobin A1C                - Ordered A1C             - Will discuss on next appointment     4. Stage 3b chronic kidney disease ([facility])                  - Cr 1.5, eGFR 34             - Patient has follow up appointment with Nephrology on Monday             - will follow peripherally     5. Chronic obstructive pulmonary disease, unspecified COPD type ([facility])  albuterol sulfate HFA (VENTOLIN HFA) 108 (90 Base) MCG/ACT inhaler               - 5 min walk test positive, qualified for portable oxygen            - portable O2 will be prescribed         Counseled regarding above diagnosis, including possible risks and complications,  especially if left uncontrolled.  Counseled regarding the possible side effects, risks, benefits and alternatives to treatment;patient and/or guardian verbalizes understanding, agrees, feels comfortable with and wishes to proceed with above treatment plan.    Call or go to ED immediately if symptoms worsen or persist. Advised patient to call with any new medication issues, and, as applicable, read all Rx info from pharmacy to assure aware of all possible risks and side effects of medicationbefore taking. Patient and/or guardian given opportunity to ask questions/raise concerns. The patient verbalized comfort and understanding ofinstructions. Reviewed age and gender appropriate health screening exams and vaccinations. Advised patient regarding importance of keeping up with recommended health maintenance and to schedule as soon as possible if overdue, as this is important in assessing for undiagnosed pathology, especially cancer, as well as protecting against potentially harmful/life threatening disease. I encourage further reading and education about your health conditions . Information on many healthconditions is provided by the American Academy of Family Physicians: https://familydoctor. org/  Please bring any questions to me at your next visit. This document may have been prepared at least partially through the use of voice recognition software. Although effort is taken to assure the accuracy of this document, it is possible that grammatical, syntax,  or spelling errors may occur. Return in about 3 months (around 5/10/2023) for Dr. Ruth Fisher.     Electronically signed by Dalila Prieto MD on 2/10/23 at 1:17 PM EST

## 2023-02-16 ENCOUNTER — TELEPHONE (OUTPATIENT)
Dept: FAMILY MEDICINE CLINIC | Age: 73
End: 2023-02-16

## 2023-02-16 DIAGNOSIS — R09.02 HYPOXIA: ICD-10-CM

## 2023-02-16 DIAGNOSIS — J44.9 CHRONIC OBSTRUCTIVE PULMONARY DISEASE, UNSPECIFIED COPD TYPE (HCC): Primary | ICD-10-CM

## 2023-02-16 NOTE — TELEPHONE ENCOUNTER
I will order it now. Please sent to her home health company or to the medical supply [employer] that she needs it to go to  Thanks.

## 2023-02-16 NOTE — TELEPHONE ENCOUNTER
Patient calling to check on status of portable oxygen that was to be ordered at her last visit 2/10/2023. She uses [employer] for oxygen supplies.

## 2023-02-17 RX ORDER — LISINOPRIL 2.5 MG/1
TABLET ORAL
COMMUNITY
Start: 2023-02-13

## 2023-02-23 ENCOUNTER — TELEPHONE (OUTPATIENT)
Dept: FAMILY MEDICINE CLINIC | Age: 73
End: 2023-02-23

## 2023-02-23 NOTE — TELEPHONE ENCOUNTER
Patient called asking if Dr. Awa Davis would review labs drawn at [facility] on 2/13/23. She states they reviewed them with her but there were some abnormal results that she is concerned about. (Patient is unsure what was abnormal)          Contains abnormal data RENAL FUNCTION PANEL  Specimen:  Blood - Blood specimen (specimen)   Ref Range & Units 10 d ago Comments   Albumin 3.9 - 4.9 g/dL 4.5     Calcium, Total 8.5 - 10.2 mg/dL 9.6     Phosphorus 2.7 - 4.8 mg/dL 3.2     Glucose 74 - 99 mg/dL 156 High   The American Diabetes Association (ADA) provides guidance for cutoff values for fasting glucose and random glucose. The ADA defines fasting as no caloric intake for at least 8 hours. Fasting plasma glucose results between 100 to 125 mg/dL indicate increased risk for diabetes (prediabetes). Fasting plasma glucose results greater than or equal to 126 mg/dL meet the criteria for diagnosis of diabetes. In the absence of unequivocal hyperglycemia, results should be confirmed by repeat testing. In a patient with classic symptoms of hyperglycemia or hyperglycemic crisis, random plasma glucose results greater than or equal to 200 mg/dL meet the criteria for diagnosis of diabetes. Reference: Standards of Medical Care in Diabetes 2016, American Diabetes Association. Diabetes Care. 2016.39(Suppl 1). BUN 7 - 21 mg/dL 33 High      Creatinine 0.58 - 0.96 mg/dL 1.67 High      Sodium 136 - 144 mmol/L 138     Potassium 3.7 - 5.1 mmol/L 4.1     Chloride 97 - 105 mmol/L 98     CO2 22 - 30 mmol/L 27     Anion Gap 9 - 18 mmol/L 13     Estimated Glomerular Filtration Rate >=60 mL/min/1.73m² 32 Low   Estimated Glomerular Filtration Rate (eGFR) is calculated using the 2021 CKD-EPI creatinine equation. This equation utilizes serum creatinine, sex, and age as parameters. The creatinine assay has traceable calibration to isotope dilution-mass spectrometry. Refer to [facility] guidelines for clinical interpretation.  In patients with unstable renal function, e.g. those with acute kidney injury, the eGFR may not accurately reflect actual GFR.    [facility]    Specimen Collected: 02/13/23 15:27 Last Resulted: 02/13/23 21:18   Received From: [facility]  Result Received: 02/23/23 11:00     Recent Data from [place] [id number] to RENAL FUNCTION PANEL  Component 02/13/23 10/27/22 08/05/22  08/04/22  08/03/22  08/02/22    Albumin 4.5 4.3 2.7 Low  3.0 Low  3.1 Low  3.5 Low    Calcium, Total 9.6 9.8 9.0 9.2 9.7 9.3   Phosphorus 3.2 3.7 -- -- -- --   Glucose 156 High   135 High   93  132 High   156 High   151 High     BUN 33 High  47 High  25 High  32 High  42 High  37 High    Creatinine 1.67 High  1.56 High  1.34 High  1.41 High  2.00 High  2.00 High    Sodium 138 137 140 137 136 135 Low    Potassium 4.1 4.6 4.5 4.4 4.3 4.3   Chloride 98 99 103 100 97 97   CO2 27 26 26 26 26 28   Anion Gap 13 12 11 11 13 10   Estimated Glomerular Filtration Rate 32 Low   35 Low   42 Low   40 Low   26 Low   26 Low     View all related data      Contains abnormal data URINALYSIS, REFLEX MICROSCOPIC  Specimen:  Urine Random - Urine specimen (specimen)   Ref Range & Units 10 d ago   Color Yellow Yellow    Clarity Clear Clear    Glucose, Urine Trace, Negative Negative    Bilirubin, Urine Negative Negative    Ketones, Urine Negative, Trace Negative    Specific Gravity, Ur 1.005 - 1.030 1.022    Hemoglobin/Blood,Ur Negative, Trace Negative    pH, Urine 5.0 - 8.0 5.5    Protein, Urine Trace, Negative 2+ Abnormal     Urobilinogen Negative Negative    Nitrites Negative Negative    Leuk Esterase Negative, 25 Uli/uL Negative    Resulting Agency  [facility]   Specimen Collected: 02/13/23 13:14 Last Resulted: 02/13/23 14:42   Received From: [facility]  Result Received: 02/23/23 11:00    View Encounter      [facility] [place] [address] to URINALYSIS, REFLEX MICROSCOPIC  Component 02/13/23  10/27/22  08/03/22 07/30/22 12/19/19 08/23/16    Color Yellow Yellow Yellow Yellow Yellow Yellow   Clarity Clear Clear Clear Clear Cloudy Abnormal  Cloudy Unknown Abnormality    Glucose, Urine Negative Negative Negative Negative Negative Negative   Bilirubin, Urine Negative Negative Negative Negative Negative Negative   Ketones, Urine Negative Negative Negative Negative Negative Negative   Specific Gravity, Ur 1.022 1.020 1.018 1.020 1.020 1.015   Hemoglobin/Blood,Ur Negative Negative Negative Negative 1+ Abnormal  1+ Unknown Abnormality    pH, Urine 5.5 5.5 6.0 6.0 5.0 5.0   Protein, Urine 2+ Abnormal  2+ Abnormal  2+ Abnormal  3+ Abnormal  >=300 Abnormal  Negative   Urobilinogen Negative Negative Negative Negative Normal Normal   Nitrites Negative Negative Negative Negative Positive Abnormal  Negative   Leuk Esterase Negative 25 Uli/mL Abnormal  Negative Negative -- --   Casts, Hyaline -- 1-3 /LPF Abnormal  -- 1-3 /LPF Abnormal  -- --   RBC, Urine -- 0-3 /HPF 0-3 /HPF 3-5 /HPF Abnormal  3-5 Abnormal  0-3   Squamous Epithelial Cells -- Few Few Few -- --   WBC, Urine -- 0-5 /HPF 0-5 /HPF 0-5 /HPF 11-25 Abnormal  0-5   View all related data      Contains abnormal data ALBUMIN/CREAT RATIO RND UR  Specimen:  Urine Random - Urine specimen (specimen)   Ref Range & Units 10 d ago Comments   Creatinine, Ur Random (UCRR) 20.0 - 300.0 mg/dL 167.4     Albumin, Urine Random mg/L 649.8     Albumin/Creat Ratio <30 mg/g 388 High   Adult Male and Female Nephrotic Criteria:   <30 mg/g is considered normal to mildly increased    mg/g is considered moderately increased   >300 mg/g is considered severely increased     KDIGO. (2013). KDIGO 2012 Clinical Practice Guideline for the Evaluation and Management of Chronic Kidney Disease. Official [address] of Nephrology, 3(1), 1-601.    [facility]    Specimen Collected: 02/13/23 13:14 Last Resulted: 02/13/23 20:42   Received From: [facility]  Result Received: 02/23/23 11:00    View Encounter      Recent Data from [address] to ALBUMIN/CREAT RATIO RND UR  Component 02/13/23  10/27/22 10/27/22 08/03/22    Creatinine, Ur Random (UCRR) 167.4 140.7 142. 6 157.1   Albumin, Urine Random 649.8 -- 976.0 --   Albumin/Creat Ratio 388 High   -- 684 High   --

## 2023-02-24 NOTE — TELEPHONE ENCOUNTER
Patient informed Dr. Rebecca Tavarez would like her to make an appointment to discuss labs in detail. Unfortunately patient is not able to come in on a Tuesday, and the soonest appointment I could offer is 4/5. Patient asks if information can be relayed by phone, she is primarily concerned about the elevated blood sugar, and her kidney function.

## 2023-03-02 NOTE — TELEPHONE ENCOUNTER
Spoke with patient regarding her labs/recommendations. She has never used insulin except for when she was in the hospital, nor has she taken anything orally for diabetes. She is hesitant about starting something and would really prefer to try natural supplements and work on her diet before starting anything. Also confirmed patient did see Nephrology at [facility] on 2/13/23 and is to follow up in 3 months.

## 2023-03-07 NOTE — TELEPHONE ENCOUNTER
Called patient to follow up on labs/recommendations & explained the following per Dr. Perez Mayo:     [place] would like Tiffany Rogers to be aware that this is considered a diabetes diagnosis. There are also long term effects of high sugars including nerve damage, kidney problems, and visual disturbance. They are not sudden conditions but will continue to worsen  the longer the sugars are not controlled. I am also concerned as there are complications with high sugars for patients with cardiac history. If we can keep the A1C < 8, then we are making progress. It looks like we are not far from that goal, and so I will allow a period of lifestyle modifications and re-evaluate Tiffany Rogers on the next appt (5/16) with another A1C. For diet, please focus on  a more balanced diet more of vegetables, fruits, grains, and limit salt to <2 g per day. Please include mod-intensity physical activity for at least 150 minutes per week (this can be split up throughout the week). I am okay with Nephrology following  and will continue to monitor progression\"     I also informed patient I will mail her diet information for reference.

## 2023-05-16 ENCOUNTER — OFFICE VISIT (OUTPATIENT)
Dept: FAMILY MEDICINE CLINIC | Age: 73
End: 2023-05-16

## 2023-05-16 VITALS
HEIGHT: 64 IN | WEIGHT: 220 LBS | OXYGEN SATURATION: 95 % | TEMPERATURE: 97.4 F | HEART RATE: 85 BPM | DIASTOLIC BLOOD PRESSURE: 80 MMHG | SYSTOLIC BLOOD PRESSURE: 139 MMHG | BODY MASS INDEX: 37.56 KG/M2 | RESPIRATION RATE: 16 BRPM

## 2023-05-16 DIAGNOSIS — I10 ESSENTIAL HYPERTENSION: ICD-10-CM

## 2023-05-16 DIAGNOSIS — E11.69 DIABETES MELLITUS TYPE 2 IN OBESE (HCC): Primary | ICD-10-CM

## 2023-05-16 DIAGNOSIS — E66.9 DIABETES MELLITUS TYPE 2 IN OBESE (HCC): Primary | ICD-10-CM

## 2023-05-16 DIAGNOSIS — E66.01 SEVERE OBESITY (BMI 35.0-39.9) WITH COMORBIDITY (HCC): ICD-10-CM

## 2023-05-16 LAB — HBA1C MFR BLD: 7.1 %

## 2023-05-16 ASSESSMENT — ENCOUNTER SYMPTOMS
EYE PAIN: 0
NAUSEA: 0
SHORTNESS OF BREATH: 0
RHINORRHEA: 0
SORE THROAT: 0
DIARRHEA: 0
WHEEZING: 0

## 2023-05-16 NOTE — PROGRESS NOTES
S: 68 y.o. female with   Chief Complaint   Patient presents with    Diabetes    Results     From [facility]       DM - A1C is improved. She is doing better. O: VS:  height is 5' 4\" (1.626 m) and weight is 220 lb (99.8 kg). Her temporal temperature is 97.4 °F (36.3 °C). Her blood pressure is 139/80 and her pulse is 85. Her respiration is 16 and oxygen saturation is 95%. BP Readings from Last 3 Encounters:   05/16/23 139/80   02/10/23 139/87   01/10/23 137/73     See resident note      Impression/Plan:   1) DM - improved   2) AWV in 4 months       Health Maintenance Due   Topic Date Due    COVID-19 Vaccine (1) Never done    Diabetic foot exam  Never done    Diabetic retinal exam  Never done    Hepatitis C screen  Never done    Breast cancer screen  Never done    Shingles vaccine (1 of 2) Never done    DEXA (modify frequency per FRAX score)  Never done    Pneumococcal 65+ years Vaccine (2 - PPSV23 if available, else PCV20) 11/14/2016         Attending Physician Statement  I have discussed the case, including pertinent history and exam findings with the resident. I agree with the documented assessment and plan.       Biju Barrow MD

## 2023-05-16 NOTE — PROGRESS NOTES
Subjective: Cr Mane is a 68 y.o. female with chief complaint of Diabetes and Results (From [facility])      HPI:  Patient coming in for follow-up for diabetes and to discuss results from [facility] clinic after seeing cardiology. Her A1c has improved from 8.5-7.1. Patient has been exercising more and changing her diet. She is still not interested in medication. Patient had a recent visit to [facility] clinic to see Cardiology. She was told that they are anticipating replacing the valve after evaluating workup. She underwent echocardiography, which is to be reviewed by Cardiology. Reviewed results, ejection fraction shows 65% ±5%. Patient has no new complaints. Denies any headaches, lightheadedness, chest pain, shortness of breath. Of note, patient also states she has relied less on her portable oxygen tank. ROS:  Review of Systems   Constitutional:  Negative for chills and fever. HENT:  Negative for congestion, rhinorrhea and sore throat. Eyes:  Negative for pain and visual disturbance. Respiratory:  Negative for shortness of breath and wheezing. Cardiovascular:  Negative for chest pain and palpitations. Gastrointestinal:  Negative for diarrhea and nausea. Genitourinary:  Negative for dysuria and hematuria. Musculoskeletal:  Negative for arthralgias and myalgias. Skin:  Negative for rash. Neurological:  Negative for dizziness and headaches. Objective:  Vitals:    05/16/23 1258   BP: 139/80   Pulse: 85   Resp: 16   Temp: 97.4 °F (36.3 °C)   TempSrc: Temporal   SpO2: 95%   Weight: 220 lb (99.8 kg)   Height: 5' 4\" (1.626 m)     Physical Exam  Vitals and nursing note reviewed. Constitutional:       General: She is not in acute distress. Appearance: Normal appearance. HENT:      Head: Normocephalic and atraumatic. Eyes:      General:         Right eye: No discharge. Left eye: No discharge.    Cardiovascular:      Rate and Rhythm: Normal rate

## 2023-06-16 NOTE — PROGRESS NOTES
Assessment/Plan:  Metatarsalgia secondary to radiculopathy, right greater than left   Pain upon ambulation   Mycosis of nail   Arthralgia  Peripheral artery disease       Plan  Chart reviewed   Patient advised on condition    She will follow-up with pain management   Today all nails debrided without pain or complication   Return for follow-up as needed for injection  Today 1 cc Kenalog 10 injected into left first MPJ without pain or complication          Diagnoses and all orders for this visit:     Metatarsalgia of both feet     Radiculopathy of lumbar region     Hallux valgus of right and left foot     Arthralgia of right and left foot, first MPJ     Onychomycosis     Peripheral artery disease         Pain upon ambulation            Subjective:  Patient has pain in her feet   She has pain with ambulation shoe wear   Right greater than left   She is aware she has back problems causing foot problems   She is following up with pain management   She has stopped taking Cymbalta   She also gets pain in her toes ambulation and shoe wear   No history of trauma   She has been following with pain management               Allergies   Allergen Reactions   • Atorvastatin         Other reaction(s): Leg Cramps  Reaction Date: [id number];    • Azithromycin         Other reaction(s): Unknown Allergic Reaction  Reaction Date: [id number]; Annotation - [id number]: jjw   • Codeine Nausea Only       Reaction Date: [id number]; Annotation - [id number]: jjw   • Moxifloxacin         Other reaction(s): Diarrhea   • Penicillins Rash       Reaction Date: [id number];  Annotation - [id number]: jjw            Current Outpatient Medications:   •  albuterol (PROAIR HFA) 90 mcg/act inhaler, Inhale 2 puffs every 6 (six) hours as needed for wheezing, Disp: 1 Inhaler, Rfl: 2  •  amLODIPine (NORVASC) 5 mg tablet, Take 1 tablet by mouth twice daily (Patient taking differently: 5 mg daily), Disp: 60 tablet, Rfl: 6  •  beclomethasone (Qvar RediHaler) 80 Subjective: Karely Anaya is a 67 y.o. female with chief complaint of Medicare AWV      HPI:  Patient is coming in today for an annual wellness visit  Patient has no new complaints, is doing well  Patient complained about some unilateral swelling that is relieved with elevating legs. Patient is following up with Pulmonology - 11/29  Patient is following up with Nephrology for CKD - 9/14   Patient is seeing Cardiology       ROS:  Review of Systems   Constitutional:  Negative for chills and fever. HENT:  Negative for congestion, rhinorrhea and sore throat. Eyes:  Negative for pain and visual disturbance. Respiratory:  Negative for shortness of breath and wheezing. Cardiovascular:  Negative for chest pain and palpitations. Gastrointestinal:  Negative for diarrhea and nausea. Genitourinary:  Negative for dysuria and hematuria. Musculoskeletal:  Negative for arthralgias and myalgias. Skin:  Negative for rash. Neurological:  Negative for dizziness and headaches. Objective:  Vitals:    09/06/22 1030   BP: (!) 153/83   Pulse: 71   Resp: 16   SpO2: 93%   Weight: 219 lb (99.3 kg)   Height: 5' 4\" (1.626 m)     Physical Exam  Vitals and nursing note reviewed. Constitutional:       General: She is not in acute distress. Appearance: Normal appearance. HENT:      Head: Normocephalic and atraumatic. Eyes:      General:         Right eye: No discharge. Left eye: No discharge. Cardiovascular:      Rate and Rhythm: Normal rate and regular rhythm. Heart sounds: Murmur (2/6 systolic murmur heard at RUSB) heard. Pulmonary:      Effort: Pulmonary effort is normal.      Breath sounds: No wheezing. Abdominal:      General: Bowel sounds are normal.      Palpations: Abdomen is soft. Tenderness: There is no abdominal tenderness. Musculoskeletal:         General: No swelling. Normal range of motion. Right lower leg: No edema.       Left lower leg: No edema (edema not MCG/ACT inhaler, Inhale 2 puffs  in the morning and 2 puffs in the evening  Rinse mouth after use   , Disp: 10 6 g, Rfl: 6  •  cycloSPORINE (RESTASIS) 0 05 % ophthalmic emulsion, Administer 1 drop to both eyes 2 (two) times a day, Disp: , Rfl:   •  DULoxetine (CYMBALTA) 30 mg delayed release capsule, Take 1 capsule (30 mg total) by mouth daily, Disp: 30 capsule, Rfl: 0  •  famotidine (PEPCID) 40 MG tablet, Take 1 tablet by mouth once daily, Disp: 30 tablet, Rfl: 0  •  levothyroxine 50 mcg tablet, Take 1 tablet by mouth once daily, Disp: 90 tablet, Rfl: 0  •  metoprolol succinate (TOPROL-XL) 50 mg 24 hr tablet, TAKE 1 TABLET BY MOUTH ONCE DAILY AT BEDTIME, Disp: 90 tablet, Rfl: 1  •  rosuvastatin (CRESTOR) 5 mg tablet, Take 1 tablet by mouth once daily, Disp: 30 tablet, Rfl: 6  •  telmisartan-hydrochlorothiazide (MICARDIS HCT) 80-12 5 MG per tablet, Take 1 tablet by mouth once daily, Disp: 90 tablet, Rfl: 0           Patient Active Problem List   Diagnosis   • Asthma   • Benign essential hypertension   • Chronic kidney disease, stage II (mild)   • Mixed hyperlipidemia   • Osteoporosis   • Other specified hypothyroidism   • Abnormal CT of the abdomen   • Epigastric pain   • Abdominal bloating   • Delayed emergence from anesthesia   • Back pain   • Lipoma of back   • Heart murmur             Patient ID: Nicki Escobedo is a 80 y  o  female      HPI     The following portions of the patient's history were reviewed and updated as appropriate:      family history includes Aneurysm in her sister; Breast cancer (age of onset: [de-identified]) in her sister; Cancer in her daughter; Heart disease in her brother, brother, brother, daughter, father, and sister; Kidney disease in her sister; No Known Problems in her maternal aunt, maternal aunt, maternal aunt, paternal aunt, and paternal aunt        reports that she has never smoked  She has never used smokeless tobacco  She reports previous alcohol use   She reports that she does not use appreciated). Skin:     General: Skin is warm and dry. Neurological:      General: No focal deficit present. Mental Status: She is oriented to person, place, and time. Assessment:  1. Initial Medicare annual wellness visit    2. Asymptomatic menopausal state         Plan:   Diagnosis Orders   1. Initial Medicare annual wellness visit                - RTC in 1 year for subsequent AWV         2. Asymptomatic menopausal state  DEXA Bone Density Axial Skeleton              Counseled regarding above diagnosis, including possible risks and complications,  especially if left uncontrolled. Counseled regarding the possible side effects, risks, benefits and alternatives to treatment;patient and/or guardian verbalizes understanding, agrees, feels comfortable with and wishes to proceed with above treatment plan. Call or go to ED immediately if symptoms worsen or persist. Advised patient to call with any new medication issues, and, as applicable, read all Rx info from pharmacy to assure aware of all possible risks and side effects of medicationbefore taking. Patient and/or guardian given opportunity to ask questions/raise concerns. The patient verbalized comfort and understanding ofinstructions. Reviewed age and gender appropriate health screening exams and vaccinations. Advised patient regarding importance of keeping up with recommended health maintenance and to schedule as soon as possible if overdue, as this is important in assessing for undiagnosed pathology, especially cancer, as well as protecting against potentially harmful/life threatening disease. I encourage further reading and education about your health conditions . Information on many healthconditions is provided by the American Academy [employer]: https://familydoctor. org/  Please bring any questions to me at your next visit.     This document may have been prepared at least partially through the use of voice recognition drugs         Objective:  Patient's shoes and socks removed    Foot ExamPhysical Exam       Patient's shoes and socks removed    Foot ExamPhysical Exam          General  General Appearance: appears stated age and healthy   Orientation: alert and oriented to person, place, and time   Affect: appropriate   Gait: antalgic         Right Foot/Ankle      Inspection and Palpation  Ecchymosis: none  Tenderness: bony tenderness    Pain patient has inflamed bunion   No evidence of a infection or gout  Swelling: dorsum   Arch: pes cavus  Claw Toes: second toe  Hallux valgus: no  Hallux limitus: yes  Skin Exam: callus and dry skin;      Neurovascular  Dorsalis pedis: 3+  Posterior tibial: 3+  Saphenous nerve sensation: normal  Tibial nerve sensation: normal  Superficial peroneal nerve sensation: normal  Deep peroneal nerve sensation: normal  Sural nerve sensation: normal        Left Foot/Ankle       Inspection and Palpation  Ecchymosis: none  Swelling: dorsum   Arch: pes planus  Hallux valgus: no  Hallux limitus: yes  Skin Exam: callus and dry skin;      Neurovascular  Dorsalis pedis: 3+  Posterior tibial: 3+  Saphenous nerve sensation: normal  Tibial nerve sensation: normal  Superficial peroneal nerve sensation: normal  Deep peroneal nerve sensation: normal  Sural nerve sensation: normal           Physical Exam  Vitals signs and nursing note reviewed  Constitutional:       Appearance: Normal appearance  Cardiovascular:      Pulses:           Dorsalis pedis pulses are 3+ on the right side and 3+ on the left side         Posterior tibial pulses are 3+ on the right side and 3+ on the left side  Musculoskeletal:      Right foot: Bony tenderness present   No bunion       Left foot: No bunion       Comments: X-ray of right foot demonstrates no evidence of fracture osteomyelitis   However middle phalanx of 2nd right toe demonstrates cystic change consistent with interosseous tophaceous change    Feet:      Right foot:      Skin software. Although effort is taken to assure the accuracy of this document, it is possible that grammatical, syntax,  or spelling errors may occur. Return for Medicare Annual Wellness Visit in 1 year. Electronically signed by Alanna Flores MD on 9/6/22 at 10:12 AM EDT  Medicare Annual Wellness Visit    Rob Retana is here for Medicare AWV    Assessment & Plan   Initial Medicare annual wellness visit  Asymptomatic menopausal state  -     DEXA Bone Density Axial Skeleton; Future    Recommendations for Preventive Services Due: see orders and patient instructions/AVS.  Recommended screening schedule for the next 5-10 years is provided to the patient in written form: see Patient Instructions/AVS.     Return for Medicare Annual Wellness Visit in 1 year. Subjective       Patient's complete Health Risk Assessment and screening values have been reviewed and are found in Flowsheets. The following problems were reviewed today and where indicated follow up appointments were made and/or referrals ordered.     Positive Risk Factor Screenings with Interventions:      Depression:  Depression Unable to Assess: Functional capacity motivation limits accuracy  PHQ-2 Score: 0  PHQ-9 Total Score: 0    Severity:1-4 = minimal depression, 5-9 = mild depression, 10-14 = moderate depression, 15-19 = moderately severe depression, 20-27 = severe depression        General Health and ACP:  General  In general, how would you say your health is?: Good  In the past 7 days, have you experienced any of the following: New or Increased Pain, New or Increased Fatigue, Loneliness, Social Isolation, Stress or Anger?: No  Do you get the social and emotional support that you need?: Yes  Do you have a Living Will?: (!) No    Advance Directives       Power of  Living Will ACP-Advance Directive ACP-Power of     Not on File Not on File Not on File Not on File        General Health Risk Interventions:  No Living Will: Advance Care Planning addressed with patient today    Health Habits/Nutrition:  Physical Activity: Sufficiently Active    Days of Exercise per Week: 7 days    Minutes of Exercise per Session: 30 min     Have you lost any weight without trying in the past 3 months?: No  Body mass index: (!) 37.59  Have you seen the dentist within the past year?: (!) No  Health Habits/Nutrition Interventions:  Dental exam overdue:  patient encouraged to make appointment with his/her dentist             Objective   Vitals:    09/06/22 1030   BP: (!) 153/83   Pulse: 71   Resp: 16   SpO2: 93%   Weight: 219 lb (99.3 kg)   Height: 5' 4\" (1.626 m)      Body mass index is 37.59 kg/m². Allergies   Allergen Reactions    Protamine Anaphylaxis and Swelling     STAGE 3, ORGAN FAILURE    Zosyn [Piperacillin Sod-Tazobactam So] Rash     Widespread rash last time she was given abx    Ciprofloxacin Nausea And Vomiting and Other (See Comments)     Patient states \"it makes me very ill. \"    Hydrocodone-Acetaminophen Other (See Comments)     PATIENT STATES IT DOESN'T WORK    Oxycodone-Acetaminophen Other (See Comments)     PATIENT STATES IT DOESN'T WORK    Vancomycin Other (See Comments)     PATIENT STATES IT DOESN'T WORK     Prior to Visit Medications    Medication Sig Taking? Authorizing Provider   furosemide (LASIX) 40 MG tablet Take 1 tablet by mouth nightly Yes Tommy Palafox MD   Ergocalciferol (DRISDOL) 200 MCG/ML drops Take 0.5 mLs by mouth in the morning.  Yes Payton Kyle MD   albuterol sulfate HFA (PROVENTIL;VENTOLIN;PROAIR) 108 (90 Base) MCG/ACT inhaler Inhale 2 puffs into the lungs every 4 hours as needed for Wheezing or Shortness of Breath Yes Historical Provider, MD   atorvastatin (LIPITOR) 40 MG tablet Take 40 mg by mouth nightly Yes Historical Provider, MD   OXYGEN Inhale 1 L/min into the lungs as needed (WHEEZING/SHORTNESS OF BREATH) Yes Historical Provider, MD   Multiple Vitamins-Minerals (THERAPEUTIC MULTIVITAMIN-MINERALS) tablet Take integrity: Callus and dry skin present       Left foot:      Skin integrity: Callus and dry skin present     Skin:     General: Skin is warm       Patient has 4/5 L5 testing           1 tablet by mouth in the morning.  Yes Historical Provider, MD   polyvinyl alcohol (LIQUIFILM TEARS) 1.4 % ophthalmic solution Place 1 drop into both eyes as needed for Dry Eyes Yes Historical Provider, MD Naranjo (Including outside providers/suppliers regularly involved in providing care):   Patient Care Team:  Brandan Joseph MD as PCP - General (Family Medicine)  Kemal Trujillo DO (Pulmonary Disease)  Roberto Haider MD (Cardiology)  Shaneka Elliott MD (Nephrology)   Reviewed and updated this visit:  Allergies  Meds

## 2023-08-29 ENCOUNTER — HOSPITAL ENCOUNTER (EMERGENCY)
Age: 73
Discharge: HOME | End: 2023-08-30
Payer: MEDICARE

## 2023-08-29 ENCOUNTER — APPOINTMENT (OUTPATIENT)
Dept: CT IMAGING | Age: 73
End: 2023-08-29
Payer: MEDICARE

## 2023-08-29 VITALS
HEART RATE: 69 BPM | BODY MASS INDEX: 38.11 KG/M2 | TEMPERATURE: 98 F | SYSTOLIC BLOOD PRESSURE: 133 MMHG | WEIGHT: 222 LBS | OXYGEN SATURATION: 91 % | RESPIRATION RATE: 18 BRPM | DIASTOLIC BLOOD PRESSURE: 63 MMHG

## 2023-08-29 DIAGNOSIS — K57.32 SIGMOID DIVERTICULITIS: Primary | ICD-10-CM

## 2023-08-29 LAB
ALBUMIN SERPL-MCNC: 3.9 G/DL (ref 3.5–5.2)
ALP SERPL-CCNC: 80 U/L (ref 35–104)
ALT SERPL-CCNC: 9 U/L (ref 0–32)
ANION GAP SERPL CALCULATED.3IONS-SCNC: 12 MMOL/L (ref 7–16)
AST SERPL-CCNC: 18 U/L (ref 0–31)
BASOPHILS # BLD: 0.05 K/UL (ref 0–0.2)
BASOPHILS NFR BLD: 1 % (ref 0–2)
BILIRUB SERPL-MCNC: 0.2 MG/DL (ref 0–1.2)
BILIRUB UR QL STRIP: NEGATIVE
BUN SERPL-MCNC: 43 MG/DL (ref 6–23)
CALCIUM SERPL-MCNC: 9.6 MG/DL (ref 8.6–10.2)
CHLORIDE SERPL-SCNC: 101 MMOL/L (ref 98–107)
CLARITY UR: CLEAR
CLUE CELLS VAG QL WET PREP: NORMAL
CO2 SERPL-SCNC: 22 MMOL/L (ref 22–29)
COLOR UR: YELLOW
CREAT SERPL-MCNC: 1.5 MG/DL (ref 0.5–1)
EOSINOPHIL # BLD: 0.32 K/UL (ref 0.05–0.5)
EOSINOPHILS RELATIVE PERCENT: 3 % (ref 0–6)
ERYTHROCYTE [DISTWIDTH] IN BLOOD BY AUTOMATED COUNT: 13.9 % (ref 11.5–15)
GFR SERPL CREATININE-BSD FRML MDRD: 36 ML/MIN/1.73M2
GLUCOSE SERPL-MCNC: 103 MG/DL (ref 74–99)
GLUCOSE UR STRIP-MCNC: NEGATIVE MG/DL
HCT VFR BLD AUTO: 46.7 % (ref 34–48)
HGB BLD-MCNC: 15.3 G/DL (ref 11.5–15.5)
HGB UR QL STRIP.AUTO: ABNORMAL
IMM GRANULOCYTES # BLD AUTO: <0.03 K/UL (ref 0–0.58)
IMM GRANULOCYTES NFR BLD: 0 % (ref 0–5)
KETONES UR STRIP-MCNC: NEGATIVE MG/DL
LACTATE BLDV-SCNC: 0.7 MMOL/L (ref 0.5–2.2)
LEUKOCYTE ESTERASE UR QL STRIP: NEGATIVE
LIPASE SERPL-CCNC: 91 U/L (ref 13–60)
LYMPHOCYTES NFR BLD: 1.68 K/UL (ref 1.5–4)
LYMPHOCYTES RELATIVE PERCENT: 16 % (ref 20–42)
MCH RBC QN AUTO: 30.1 PG (ref 26–35)
MCHC RBC AUTO-ENTMCNC: 32.8 G/DL (ref 32–34.5)
MCV RBC AUTO: 91.9 FL (ref 80–99.9)
MONOCYTES NFR BLD: 0.72 K/UL (ref 0.1–0.95)
MONOCYTES NFR BLD: 7 % (ref 2–12)
NEUTROPHILS NFR BLD: 73 % (ref 43–80)
NEUTS SEG NFR BLD: 7.55 K/UL (ref 1.8–7.3)
NITRITE UR QL STRIP: NEGATIVE
PH UR STRIP: 5.5 [PH] (ref 5–9)
PLATELET # BLD AUTO: 175 K/UL (ref 130–450)
PMV BLD AUTO: 10.6 FL (ref 7–12)
POTASSIUM SERPL-SCNC: 4.4 MMOL/L (ref 3.5–5)
PROT SERPL-MCNC: 7.7 G/DL (ref 6.4–8.3)
PROT UR STRIP-MCNC: 100 MG/DL
RBC # BLD AUTO: 5.08 M/UL (ref 3.5–5.5)
RBC #/AREA URNS HPF: ABNORMAL /HPF
SODIUM SERPL-SCNC: 135 MMOL/L (ref 132–146)
SOURCE WET PREP: NORMAL
SP GR UR STRIP: 1.02 (ref 1–1.03)
T VAGINALIS VAG QL WET PREP: NORMAL
TROPONIN I SERPL HS-MCNC: 18 NG/L (ref 0–9)
TROPONIN I SERPL HS-MCNC: 19 NG/L (ref 0–9)
UROBILINOGEN UR STRIP-ACNC: 0.2 EU/DL (ref 0–1)
WBC #/AREA URNS HPF: ABNORMAL /HPF
WBC OTHER # BLD: 10.3 K/UL (ref 4.5–11.5)
YEAST WET PREP: NORMAL

## 2023-08-29 PROCEDURE — 83690 ASSAY OF LIPASE: CPT

## 2023-08-29 PROCEDURE — 80053 COMPREHEN METABOLIC PANEL: CPT

## 2023-08-29 PROCEDURE — 74176 CT ABD & PELVIS W/O CONTRAST: CPT

## 2023-08-29 PROCEDURE — 96375 TX/PRO/DX INJ NEW DRUG ADDON: CPT

## 2023-08-29 PROCEDURE — 6360000002 HC RX W HCPCS: Performed by: NURSE PRACTITIONER

## 2023-08-29 PROCEDURE — 96365 THER/PROPH/DIAG IV INF INIT: CPT

## 2023-08-29 PROCEDURE — 85025 COMPLETE CBC W/AUTO DIFF WBC: CPT

## 2023-08-29 PROCEDURE — 83605 ASSAY OF LACTIC ACID: CPT

## 2023-08-29 PROCEDURE — 87210 SMEAR WET MOUNT SALINE/INK: CPT

## 2023-08-29 PROCEDURE — 2580000003 HC RX 258: Performed by: NURSE PRACTITIONER

## 2023-08-29 PROCEDURE — 81001 URINALYSIS AUTO W/SCOPE: CPT

## 2023-08-29 PROCEDURE — 84484 ASSAY OF TROPONIN QUANT: CPT

## 2023-08-29 PROCEDURE — 99284 EMERGENCY DEPT VISIT MOD MDM: CPT

## 2023-08-29 PROCEDURE — 93005 ELECTROCARDIOGRAM TRACING: CPT | Performed by: NURSE PRACTITIONER

## 2023-08-29 RX ORDER — FENTANYL CITRATE 50 UG/ML
25 INJECTION, SOLUTION INTRAMUSCULAR; INTRAVENOUS ONCE
Status: COMPLETED | OUTPATIENT
Start: 2023-08-30 | End: 2023-08-29

## 2023-08-29 RX ORDER — METRONIDAZOLE 500 MG/1
500 TABLET ORAL 3 TIMES DAILY
Qty: 30 TABLET | Refills: 0 | Status: SHIPPED | OUTPATIENT
Start: 2023-08-29 | End: 2023-09-08

## 2023-08-29 RX ORDER — KETOROLAC TROMETHAMINE 30 MG/ML
15 INJECTION, SOLUTION INTRAMUSCULAR; INTRAVENOUS ONCE
Status: COMPLETED | OUTPATIENT
Start: 2023-08-29 | End: 2023-08-29

## 2023-08-29 RX ORDER — CEFDINIR 300 MG/1
300 CAPSULE ORAL 2 TIMES DAILY
Qty: 20 CAPSULE | Refills: 0 | Status: SHIPPED | OUTPATIENT
Start: 2023-08-29 | End: 2023-09-08

## 2023-08-29 RX ORDER — METRONIDAZOLE 500 MG/100ML
500 INJECTION, SOLUTION INTRAVENOUS ONCE
Status: COMPLETED | OUTPATIENT
Start: 2023-08-29 | End: 2023-08-30

## 2023-08-29 RX ORDER — ONDANSETRON 2 MG/ML
4 INJECTION INTRAMUSCULAR; INTRAVENOUS ONCE
Status: COMPLETED | OUTPATIENT
Start: 2023-08-29 | End: 2023-08-29

## 2023-08-29 RX ORDER — 0.9 % SODIUM CHLORIDE 0.9 %
1000 INTRAVENOUS SOLUTION INTRAVENOUS ONCE
Status: COMPLETED | OUTPATIENT
Start: 2023-08-29 | End: 2023-08-29

## 2023-08-29 RX ADMIN — KETOROLAC TROMETHAMINE 15 MG: 30 INJECTION, SOLUTION INTRAMUSCULAR; INTRAVENOUS at 19:25

## 2023-08-29 RX ADMIN — METRONIDAZOLE 500 MG: 500 INJECTION, SOLUTION INTRAVENOUS at 23:35

## 2023-08-29 RX ADMIN — SODIUM CHLORIDE 1000 ML: 9 INJECTION, SOLUTION INTRAVENOUS at 19:24

## 2023-08-29 RX ADMIN — FENTANYL CITRATE 25 MCG: 50 INJECTION, SOLUTION INTRAMUSCULAR; INTRAVENOUS at 23:40

## 2023-08-29 RX ADMIN — WATER 1000 MG: 1 INJECTION INTRAMUSCULAR; INTRAVENOUS; SUBCUTANEOUS at 23:19

## 2023-08-29 RX ADMIN — ONDANSETRON 4 MG: 2 INJECTION INTRAMUSCULAR; INTRAVENOUS at 19:25

## 2023-08-29 ASSESSMENT — PAIN SCALES - GENERAL
PAINLEVEL_OUTOF10: 6
PAINLEVEL_OUTOF10: 6

## 2023-08-29 ASSESSMENT — PAIN DESCRIPTION - LOCATION: LOCATION: ABDOMEN

## 2023-08-29 ASSESSMENT — PAIN DESCRIPTION - DESCRIPTORS: DESCRIPTORS: ACHING;SORE;DISCOMFORT

## 2023-08-29 ASSESSMENT — PAIN - FUNCTIONAL ASSESSMENT: PAIN_FUNCTIONAL_ASSESSMENT: 0-10

## 2023-08-30 PROCEDURE — 6370000000 HC RX 637 (ALT 250 FOR IP): Performed by: NURSE PRACTITIONER

## 2023-08-30 PROCEDURE — 96375 TX/PRO/DX INJ NEW DRUG ADDON: CPT

## 2023-08-30 RX ORDER — OXYCODONE HYDROCHLORIDE AND ACETAMINOPHEN 5; 325 MG/1; MG/1
1 TABLET ORAL ONCE
Status: COMPLETED | OUTPATIENT
Start: 2023-08-30 | End: 2023-08-30

## 2023-08-30 RX ORDER — OXYCODONE HYDROCHLORIDE AND ACETAMINOPHEN 5; 325 MG/1; MG/1
1 TABLET ORAL EVERY 6 HOURS PRN
Qty: 12 TABLET | Refills: 0 | Status: SHIPPED | OUTPATIENT
Start: 2023-08-30 | End: 2023-09-02

## 2023-08-30 RX ADMIN — OXYCODONE AND ACETAMINOPHEN 1 TABLET: 5; 325 TABLET ORAL at 00:45

## 2023-08-30 ASSESSMENT — PAIN SCALES - GENERAL: PAINLEVEL_OUTOF10: 4

## 2023-08-30 NOTE — ED PROVIDER NOTES
Independent [facility] Visit. [address] ENCOUNTER      Pt Name: Oliver Miguel  MRN: [id number]  [facility] 1950  Date of evaluation: 8/29/2023  Provider: ROOSEVELT Zeng - CNP  PCP: Filiberto Fernandez MD  Note Started: 8:27 PM EDT 8/29/23    CHIEF COMPLAINT       Chief Complaint   Patient presents with    Abdominal Pain     Lower abdominal pain,nauseated,bladder aching/pressure. HISTORY OF PRESENT ILLNESS: 1 or more Elements   History From: Patient  Limitations to history : None    Oliver Miguel is a 68 y.o. female who has a past medical history of stage IIIb CKD, DM, obesity, pneumonia, AVR, MVR, hypertension, A-fib, IVC filter presents with a 1 day history abdominal bloating, generalized abdominal cramping, bladder pain and pressure, nausea, foul-smelling vaginal discharge. States she has a history of diverticulitis and feels as if her symptoms are similar. She endorses mild nausea, no emesis, no diarrhea, mild constipation, no melena or hematochezia. Rates her current pain as a 6 out of 10 with no identifiable exacerbating or remitting factors. Patient states that she has been able to eat and drink well with no difficulty. She denies any concern for sexually transmitted infection, she is not currently sexually active. Nursing Notes were all reviewed and agreed with or any disagreements were addressed in the HPI. REVIEW OF SYSTEMS :    Positives and Pertinent negatives as per HPI.      SURGICAL HISTORY     Past Surgical History:   Procedure Laterality Date    AORTIC VALVE REPLACEMENT      CARDIAC SURGERY      \"homograph\" valve replacement    COLONOSCOPY      ENDOSCOPY, COLON, DIAGNOSTIC      GASTROSTOMY TUBE PLACEMENT  09/26/2016    THORACENTESIS  10/14/2016    VENA CAVA FILTER PLACEMENT Right 10/14/2016       CURRENTMEDICATIONS       Previous Medications    ALBUTEROL (PROVENTIL) (2.5 MG/3ML) 0.083% NEBULIZER

## 2023-08-30 NOTE — ED NOTES
Pt ambulating to bathroom at this time.  Pt awaiting vaginal exam by doctor      Tyler Spine  08/29/23 [id number]

## 2023-08-30 NOTE — DISCHARGE INSTRUCTIONS
Omnicef twice daily for the next 10 days, Flagyl 3 times daily for the next 10 days, if you develop any severe abdominal pain or fevers please return to the ER. Otherwise please follow-up with your PCP in 1 week for recheck.   Your vaginal swabs were negative for bacterial vaginosis or yeast.

## 2023-08-31 ENCOUNTER — CLINICAL DOCUMENTATION ONLY (OUTPATIENT)
Facility: CLINIC | Age: 73
End: 2023-08-31

## 2023-08-31 LAB
EKG ATRIAL RATE: 73 BPM
EKG P-R INTERVAL: 134 MS
EKG Q-T INTERVAL: 466 MS
EKG QRS DURATION: 160 MS
EKG QTC CALCULATION (BAZETT): 513 MS
EKG R AXIS: -18 DEGREES
EKG T AXIS: 46 DEGREES
EKG VENTRICULAR RATE: 73 BPM

## 2023-08-31 PROCEDURE — 93010 ELECTROCARDIOGRAM REPORT: CPT | Performed by: INTERNAL MEDICINE

## 2023-09-08 ENCOUNTER — OFFICE VISIT (OUTPATIENT)
Dept: FAMILY MEDICINE CLINIC | Age: 73
End: 2023-09-08

## 2023-09-08 VITALS
TEMPERATURE: 98 F | WEIGHT: 209 LBS | HEART RATE: 72 BPM | DIASTOLIC BLOOD PRESSURE: 71 MMHG | OXYGEN SATURATION: 95 % | BODY MASS INDEX: 35.68 KG/M2 | RESPIRATION RATE: 16 BRPM | HEIGHT: 64 IN | SYSTOLIC BLOOD PRESSURE: 122 MMHG

## 2023-09-08 DIAGNOSIS — R80.9 PROTEINURIA, UNSPECIFIED TYPE: ICD-10-CM

## 2023-09-08 DIAGNOSIS — E11.69 DIABETES MELLITUS TYPE 2 IN OBESE (HCC): ICD-10-CM

## 2023-09-08 DIAGNOSIS — K57.92 DIVERTICULITIS: Primary | ICD-10-CM

## 2023-09-08 DIAGNOSIS — L98.9 SKIN LESION: ICD-10-CM

## 2023-09-08 DIAGNOSIS — E66.9 DIABETES MELLITUS TYPE 2 IN OBESE (HCC): ICD-10-CM

## 2023-09-08 LAB — HBA1C MFR BLD: 6.7 %

## 2023-09-08 NOTE — PROGRESS NOTES
[facility]  Department of Family Medicine  Family Medicine Residency Program      Patient: Oneil Graham 68 y.o. female     Date of Service: 9/8/23      Chief complaint:   Chief Complaint   Patient presents with    ED Follow-up    Diverticulitis     Doing better but had bad night last night     Abdominal Pain     \"Bladder is sore\"     Urinary Pain     Kidney doctor said she is spilling protein into her urine and prescribed Farxiga. She thinks she had an allergic reaction- developed rash under breasts        HISTORY OF PRESENTING ILLNESS     68 y.o. female presented to the clinic today for ED f/u of diverticulitis. ED 8/29 for Diverticulitis  Compliant with abx, Omnicef and Flagyl, today is last day  BM regular, semi-solid  Nausea and back pain resolved   Bladder pain? At time of ED presentation improved. No UTI on UA in ED    Proteinuria following with Nephrology at [facility]. Rash resolved, 6 weeks ago after started [place] due to rash. Discomfort with urination    DM  POCT A1c 6.7    Skin lesion at right superior chest s/p central line many years ago. Continues to heal and scab repeatedly.       Health Maintenance:  Health Maintenance Due   Topic Date Due    COVID-19 Vaccine (1) Never done    Diabetic retinal exam  Never done    Hepatitis C screen  Never done    Breast cancer screen  Never done    Shingles vaccine (1 of 2) Never done    DEXA (modify frequency per FRAX score)  Never done    Hepatitis B vaccine (1 of 3 - Risk 3-dose series) Never done    Pneumococcal 65+ years Vaccine (2 - PPSV23 or PCV20) 11/14/2016    Lipids  07/27/2023    Flu vaccine (1) 08/01/2023    Annual Wellness Visit (AWV)  09/07/2023    Diabetic Alb to Cr ratio (uACR) test  09/10/2023     Allergies:    Protamine, Wasp venom, Zosyn [piperacillin sod-tazobactam so], Field mint [mentha], Pcn [penicillins], Ciprofloxacin, Hydrocodone-acetaminophen, Oxycodone-acetaminophen,

## 2023-09-14 ASSESSMENT — ENCOUNTER SYMPTOMS
VOMITING: 0
SHORTNESS OF BREATH: 0
DIARRHEA: 0
COUGH: 0
TROUBLE SWALLOWING: 0
COLOR CHANGE: 0
CONSTIPATION: 0
NAUSEA: 0
ABDOMINAL PAIN: 0
SORE THROAT: 0

## 2023-09-19 ENCOUNTER — PROCEDURE VISIT (OUTPATIENT)
Dept: FAMILY MEDICINE CLINIC | Age: 73
End: 2023-09-19

## 2023-09-19 VITALS
DIASTOLIC BLOOD PRESSURE: 78 MMHG | SYSTOLIC BLOOD PRESSURE: 148 MMHG | WEIGHT: 209 LBS | BODY MASS INDEX: 35.68 KG/M2 | HEIGHT: 64 IN | RESPIRATION RATE: 17 BRPM | OXYGEN SATURATION: 94 % | TEMPERATURE: 97.8 F | HEART RATE: 68 BPM

## 2023-09-19 DIAGNOSIS — D49.2 SKIN NEOPLASM: Primary | ICD-10-CM

## 2023-09-19 RX ORDER — LIDOCAINE HYDROCHLORIDE AND EPINEPHRINE BITARTRATE 20; .01 MG/ML; MG/ML
2 INJECTION, SOLUTION SUBCUTANEOUS ONCE
Status: COMPLETED | OUTPATIENT
Start: 2023-09-19 | End: 2023-09-19

## 2023-09-19 RX ADMIN — LIDOCAINE HYDROCHLORIDE AND EPINEPHRINE BITARTRATE 2 ML: 20; .01 INJECTION, SOLUTION SUBCUTANEOUS at 15:24

## 2023-09-19 NOTE — PROGRESS NOTES
Patient fully informed and agrees to proceed. Under local anesthesia and aseptic technique, a 5 mm papular lesion of the anterior chest wall was excised. The wound edges were approximated with 3 interrupted sutures of 4-0 prolene. Precautions given. Follow up in one week. Attending Physician Statement  I have discussed the case, including pertinent history and exam findings with the resident. I also have seen the patient and performed key portions of the examination and procedure. I agree with the documented assessment and plan.

## 2023-09-19 NOTE — PROGRESS NOTES
Chief complaint : Patient present for excision of skin lesion. History :  Skin Lesion: Patient complains of a skin lesion of the chest. The lesion has been present for 7 year. Lesion has changed only from scabbing over and patient picking at lesion Symptoms associated with the lesion are: increasing thickness, itching, bleeding, unsightliness, pain. Patient denies infection. Examination:  Lesion location : Right Chest  Size : 5 mm. Procedure:  Under local anesthesia with 3 cc of 2% lidocaine with epi and aseptic technique, the lesion was completely excised with a #15 blade. The edges were approximated with 3  interrupted sutures of 4-0 prolene. Bleeding was well controlled. Sterile bandage was applied. Instructions : Patient to keep wound clean and dry. Change dressing daily for the next 2 days. Call if increased redness, pain or discharge. Call if fever or any other concerns. Follow-up in 7 days for suture removal.  All questions answered.

## 2023-09-21 LAB — SURGICAL PATHOLOGY REPORT: NORMAL

## 2023-09-26 ENCOUNTER — OFFICE VISIT (OUTPATIENT)
Dept: FAMILY MEDICINE CLINIC | Age: 73
End: 2023-09-26
Payer: MEDICARE

## 2023-09-26 VITALS
WEIGHT: 207 LBS | BODY MASS INDEX: 35.34 KG/M2 | RESPIRATION RATE: 16 BRPM | TEMPERATURE: 97.4 F | SYSTOLIC BLOOD PRESSURE: 149 MMHG | HEIGHT: 64 IN | HEART RATE: 82 BPM | OXYGEN SATURATION: 95 % | DIASTOLIC BLOOD PRESSURE: 80 MMHG

## 2023-09-26 DIAGNOSIS — I10 ESSENTIAL HYPERTENSION: Primary | ICD-10-CM

## 2023-09-26 PROCEDURE — G8400 PT W/DXA NO RESULTS DOC: HCPCS | Performed by: STUDENT IN AN ORGANIZED HEALTH CARE EDUCATION/TRAINING PROGRAM

## 2023-09-26 PROCEDURE — 3074F SYST BP LT 130 MM HG: CPT | Performed by: STUDENT IN AN ORGANIZED HEALTH CARE EDUCATION/TRAINING PROGRAM

## 2023-09-26 PROCEDURE — 1123F ACP DISCUSS/DSCN MKR DOCD: CPT | Performed by: STUDENT IN AN ORGANIZED HEALTH CARE EDUCATION/TRAINING PROGRAM

## 2023-09-26 PROCEDURE — G8417 CALC BMI ABV UP PARAM F/U: HCPCS | Performed by: STUDENT IN AN ORGANIZED HEALTH CARE EDUCATION/TRAINING PROGRAM

## 2023-09-26 PROCEDURE — 3017F COLORECTAL CA SCREEN DOC REV: CPT | Performed by: STUDENT IN AN ORGANIZED HEALTH CARE EDUCATION/TRAINING PROGRAM

## 2023-09-26 PROCEDURE — 1090F PRES/ABSN URINE INCON ASSESS: CPT | Performed by: STUDENT IN AN ORGANIZED HEALTH CARE EDUCATION/TRAINING PROGRAM

## 2023-09-26 PROCEDURE — 1036F TOBACCO NON-USER: CPT | Performed by: STUDENT IN AN ORGANIZED HEALTH CARE EDUCATION/TRAINING PROGRAM

## 2023-09-26 PROCEDURE — G8427 DOCREV CUR MEDS BY ELIG CLIN: HCPCS | Performed by: STUDENT IN AN ORGANIZED HEALTH CARE EDUCATION/TRAINING PROGRAM

## 2023-09-26 PROCEDURE — 3078F DIAST BP <80 MM HG: CPT | Performed by: STUDENT IN AN ORGANIZED HEALTH CARE EDUCATION/TRAINING PROGRAM

## 2023-09-26 PROCEDURE — 99213 OFFICE O/P EST LOW 20 MIN: CPT | Performed by: STUDENT IN AN ORGANIZED HEALTH CARE EDUCATION/TRAINING PROGRAM

## 2023-09-26 RX ORDER — LISINOPRIL 10 MG/1
10 TABLET ORAL DAILY
Qty: 90 TABLET | Refills: 1 | Status: SHIPPED | OUTPATIENT
Start: 2023-09-26

## 2023-09-26 RX ORDER — LISINOPRIL 20 MG/1
20 TABLET ORAL DAILY
Qty: 90 TABLET | Refills: 1 | Status: CANCELLED | OUTPATIENT
Start: 2023-09-26

## 2023-09-26 NOTE — PROGRESS NOTES
Follow up of excision of skin lesion  Path AK  Healing well  Sutures removed  /80  A/P  HTN  Lisinopril 10 mg/d  Monitor BP at home  Attending Physician Statement  I have discussed the case, including pertinent history and exam findings with the resident. I also have seen the patient and performed key portions of the examination. I agree with the documented assessment and plan.
sutures. Pathology returned as hypertrophic actinic keratosis. Suture Removal: Patient here for suture removal. she had skin lesion excision right chest 1 week ago, with placement of 3 interrupted 4-0 Prolene sutures. She had 3 suture removed without incident today in the office      Return to Office: Return as needed. This document may have been prepared at least partiallythrough the use of voice recognition software. Although effort is taken to assure the accuracy of this document, it is possible that grammatical, syntax,  or spelling errors may occur. Medication List:    Current Outpatient Medications   Medication Sig Dispense Refill    lisinopril (PRINIVIL;ZESTRIL) 10 MG tablet Take 1 tablet by mouth daily 90 tablet 1    albuterol sulfate HFA (VENTOLIN HFA) 108 (90 Base) MCG/ACT inhaler Inhale 2 puffs into the lungs 4 times daily as needed for Wheezing 54 g 3    albuterol (PROVENTIL) (2.5 MG/3ML) 0.083% nebulizer solution inhale contents of 1 vial ( 3 milliliters ) in nebulizer by mouth. ..  (REFER TO PRESCRIPTION NOTES). albuterol (PROVENTIL) (5 MG/ML) 0.5% nebulizer solution Take 0.5 mLs by nebulization every 6 hours as needed for Wheezing 120 each 0    OXYGEN Inhale 1 L/min into the lungs as needed (WHEEZING/SHORTNESS OF BREATH)      polyvinyl alcohol (LIQUIFILM TEARS) 1.4 % ophthalmic solution Place 1 drop into both eyes as needed for Dry Eyes      furosemide (LASIX) 40 MG tablet take 1 tablet by mouth nightly (Patient not taking: Reported on 9/26/2023) 30 tablet 2     No current facility-administered medications for this visit.      Facility-Administered Medications Ordered in Other Visits   Medication Dose Route Frequency Provider Last Rate Last Admin    morphine (PF) injection 2 mg  2 mg IntraVENous Q5 Min PRN DO Kimber Payton,  PGY-1

## 2023-09-27 ENCOUNTER — APPOINTMENT (OUTPATIENT)
Dept: CT IMAGING | Age: 73
End: 2023-09-27
Payer: MEDICARE

## 2023-09-27 ENCOUNTER — HOSPITAL ENCOUNTER (EMERGENCY)
Age: 73
Discharge: HOME | End: 2023-09-27
Attending: STUDENT IN AN ORGANIZED HEALTH CARE EDUCATION/TRAINING PROGRAM
Payer: MEDICARE

## 2023-09-27 VITALS
RESPIRATION RATE: 18 BRPM | OXYGEN SATURATION: 95 % | HEART RATE: 72 BPM | TEMPERATURE: 98.6 F | DIASTOLIC BLOOD PRESSURE: 75 MMHG | SYSTOLIC BLOOD PRESSURE: 149 MMHG

## 2023-09-27 DIAGNOSIS — K92.1 STOOL COLOR BLACK: ICD-10-CM

## 2023-09-27 DIAGNOSIS — R51.9 NONINTRACTABLE HEADACHE, UNSPECIFIED CHRONICITY PATTERN, UNSPECIFIED HEADACHE TYPE: Primary | ICD-10-CM

## 2023-09-27 LAB
ALBUMIN SERPL-MCNC: 4 G/DL (ref 3.5–5.2)
ALP SERPL-CCNC: 77 U/L (ref 35–104)
ALT SERPL-CCNC: 19 U/L (ref 0–32)
ANION GAP SERPL CALCULATED.3IONS-SCNC: 9 MMOL/L (ref 7–16)
AST SERPL-CCNC: 20 U/L (ref 0–31)
BASOPHILS # BLD: 0.05 K/UL (ref 0–0.2)
BASOPHILS NFR BLD: 1 % (ref 0–2)
BILIRUB SERPL-MCNC: 0.3 MG/DL (ref 0–1.2)
BILIRUB UR QL STRIP: NEGATIVE
BNP SERPL-MCNC: 347 PG/ML (ref 0–125)
BUN SERPL-MCNC: 26 MG/DL (ref 6–23)
CALCIUM SERPL-MCNC: 9.7 MG/DL (ref 8.6–10.2)
CHLORIDE SERPL-SCNC: 103 MMOL/L (ref 98–107)
CLARITY UR: CLEAR
CO2 SERPL-SCNC: 26 MMOL/L (ref 22–29)
COLOR UR: YELLOW
CREAT SERPL-MCNC: 1.7 MG/DL (ref 0.5–1)
EOSINOPHIL # BLD: 0.26 K/UL (ref 0.05–0.5)
EOSINOPHILS RELATIVE PERCENT: 4 % (ref 0–6)
EPI CELLS #/AREA URNS HPF: ABNORMAL /HPF
ERYTHROCYTE [DISTWIDTH] IN BLOOD BY AUTOMATED COUNT: 14.1 % (ref 11.5–15)
GFR SERPL CREATININE-BSD FRML MDRD: 32 ML/MIN/1.73M2
GLUCOSE SERPL-MCNC: 107 MG/DL (ref 74–99)
GLUCOSE UR STRIP-MCNC: NEGATIVE MG/DL
HCT VFR BLD AUTO: 44.4 % (ref 34–48)
HGB BLD-MCNC: 15 G/DL (ref 11.5–15.5)
HGB UR QL STRIP.AUTO: ABNORMAL
IMM GRANULOCYTES # BLD AUTO: <0.03 K/UL (ref 0–0.58)
IMM GRANULOCYTES NFR BLD: 0 % (ref 0–5)
KETONES UR STRIP-MCNC: NEGATIVE MG/DL
LEUKOCYTE ESTERASE UR QL STRIP: NEGATIVE
LYMPHOCYTES NFR BLD: 1.34 K/UL (ref 1.5–4)
LYMPHOCYTES RELATIVE PERCENT: 22 % (ref 20–42)
MCH RBC QN AUTO: 30.4 PG (ref 26–35)
MCHC RBC AUTO-ENTMCNC: 33.8 G/DL (ref 32–34.5)
MCV RBC AUTO: 89.9 FL (ref 80–99.9)
MONOCYTES NFR BLD: 0.44 K/UL (ref 0.1–0.95)
MONOCYTES NFR BLD: 7 % (ref 2–12)
NEUTROPHILS NFR BLD: 66 % (ref 43–80)
NEUTS SEG NFR BLD: 4.14 K/UL (ref 1.8–7.3)
NITRITE UR QL STRIP: NEGATIVE
PH UR STRIP: 6 [PH] (ref 5–9)
PLATELET # BLD AUTO: 169 K/UL (ref 130–450)
PMV BLD AUTO: 10.1 FL (ref 7–12)
POTASSIUM SERPL-SCNC: 4.4 MMOL/L (ref 3.5–5)
PROT SERPL-MCNC: 7.3 G/DL (ref 6.4–8.3)
PROT UR STRIP-MCNC: 100 MG/DL
RBC # BLD AUTO: 4.94 M/UL (ref 3.5–5.5)
RBC #/AREA URNS HPF: ABNORMAL /HPF
SODIUM SERPL-SCNC: 138 MMOL/L (ref 132–146)
SP GR UR STRIP: 1.02 (ref 1–1.03)
TROPONIN I SERPL HS-MCNC: 23 NG/L (ref 0–9)
TROPONIN I SERPL HS-MCNC: 23 NG/L (ref 0–9)
UROBILINOGEN UR STRIP-ACNC: 0.2 EU/DL (ref 0–1)
WBC #/AREA URNS HPF: ABNORMAL /HPF
WBC OTHER # BLD: 6.2 K/UL (ref 4.5–11.5)

## 2023-09-27 PROCEDURE — 6370000000 HC RX 637 (ALT 250 FOR IP): Performed by: STUDENT IN AN ORGANIZED HEALTH CARE EDUCATION/TRAINING PROGRAM

## 2023-09-27 PROCEDURE — 80053 COMPREHEN METABOLIC PANEL: CPT

## 2023-09-27 PROCEDURE — 93005 ELECTROCARDIOGRAM TRACING: CPT | Performed by: STUDENT IN AN ORGANIZED HEALTH CARE EDUCATION/TRAINING PROGRAM

## 2023-09-27 PROCEDURE — 96375 TX/PRO/DX INJ NEW DRUG ADDON: CPT

## 2023-09-27 PROCEDURE — 96365 THER/PROPH/DIAG IV INF INIT: CPT

## 2023-09-27 PROCEDURE — 84484 ASSAY OF TROPONIN QUANT: CPT

## 2023-09-27 PROCEDURE — 99285 EMERGENCY DEPT VISIT HI MDM: CPT

## 2023-09-27 PROCEDURE — 85025 COMPLETE CBC W/AUTO DIFF WBC: CPT

## 2023-09-27 PROCEDURE — 70450 CT HEAD/BRAIN W/O DYE: CPT

## 2023-09-27 PROCEDURE — 71045 X-RAY EXAM CHEST 1 VIEW: CPT

## 2023-09-27 PROCEDURE — 83880 ASSAY OF NATRIURETIC PEPTIDE: CPT

## 2023-09-27 PROCEDURE — 6360000002 HC RX W HCPCS: Performed by: STUDENT IN AN ORGANIZED HEALTH CARE EDUCATION/TRAINING PROGRAM

## 2023-09-27 PROCEDURE — 81001 URINALYSIS AUTO W/SCOPE: CPT

## 2023-09-27 RX ORDER — FLUTICASONE PROPIONATE 50 MCG
2 SPRAY, SUSPENSION (ML) NASAL DAILY
Qty: 16 G | Refills: 0 | Status: SHIPPED | OUTPATIENT
Start: 2023-09-27

## 2023-09-27 RX ORDER — DIPHENHYDRAMINE HYDROCHLORIDE 50 MG/ML
25 INJECTION INTRAMUSCULAR; INTRAVENOUS ONCE
Status: COMPLETED | OUTPATIENT
Start: 2023-09-27 | End: 2023-09-27

## 2023-09-27 RX ORDER — MAGNESIUM SULFATE IN WATER 40 MG/ML
2000 INJECTION, SOLUTION INTRAVENOUS ONCE
Status: COMPLETED | OUTPATIENT
Start: 2023-09-27 | End: 2023-09-27

## 2023-09-27 RX ORDER — ACETAMINOPHEN 500 MG
1000 TABLET ORAL ONCE
Status: COMPLETED | OUTPATIENT
Start: 2023-09-27 | End: 2023-09-27

## 2023-09-27 RX ORDER — METOCLOPRAMIDE HYDROCHLORIDE 5 MG/ML
10 INJECTION INTRAMUSCULAR; INTRAVENOUS ONCE
Status: COMPLETED | OUTPATIENT
Start: 2023-09-27 | End: 2023-09-27

## 2023-09-27 RX ADMIN — ACETAMINOPHEN 1000 MG: 500 TABLET ORAL at 18:56

## 2023-09-27 RX ADMIN — METOCLOPRAMIDE HYDROCHLORIDE 10 MG: 5 INJECTION INTRAMUSCULAR; INTRAVENOUS at 16:39

## 2023-09-27 RX ADMIN — DIPHENHYDRAMINE HYDROCHLORIDE 25 MG: 50 INJECTION INTRAMUSCULAR; INTRAVENOUS at 16:40

## 2023-09-27 RX ADMIN — MAGNESIUM SULFATE HEPTAHYDRATE 2000 MG: 40 INJECTION, SOLUTION INTRAVENOUS at 18:51

## 2023-09-27 ASSESSMENT — PATIENT HEALTH QUESTIONNAIRE - PHQ9
SUM OF ALL RESPONSES TO PHQ QUESTIONS 1-9: 0
2. FEELING DOWN, DEPRESSED OR HOPELESS: 0
SUM OF ALL RESPONSES TO PHQ QUESTIONS 1-9: 0
1. LITTLE INTEREST OR PLEASURE IN DOING THINGS: 0
SUM OF ALL RESPONSES TO PHQ QUESTIONS 1-9: 0
SUM OF ALL RESPONSES TO PHQ9 QUESTIONS 1 & 2: 0
SUM OF ALL RESPONSES TO PHQ QUESTIONS 1-9: 0

## 2023-09-27 ASSESSMENT — ENCOUNTER SYMPTOMS
NAUSEA: 0
ABDOMINAL PAIN: 0
COLOR CHANGE: 0
VOMITING: 0
BACK PAIN: 0
COUGH: 0
SHORTNESS OF BREATH: 0
DIARRHEA: 0

## 2023-09-27 ASSESSMENT — PAIN DESCRIPTION - ORIENTATION: ORIENTATION: LEFT;ANTERIOR

## 2023-09-27 ASSESSMENT — PAIN DESCRIPTION - DESCRIPTORS: DESCRIPTORS: ACHING;DISCOMFORT;PRESSURE

## 2023-09-27 ASSESSMENT — PAIN - FUNCTIONAL ASSESSMENT: PAIN_FUNCTIONAL_ASSESSMENT: 0-10

## 2023-09-27 ASSESSMENT — PAIN SCALES - GENERAL: PAINLEVEL_OUTOF10: 8

## 2023-09-27 ASSESSMENT — LIFESTYLE VARIABLES: HOW OFTEN DO YOU HAVE A DRINK CONTAINING ALCOHOL: NEVER

## 2023-09-27 ASSESSMENT — PAIN DESCRIPTION - LOCATION: LOCATION: HEAD

## 2023-09-27 NOTE — ED PROVIDER NOTES
HPI   70-year-old female patient presenting to emergency department for evaluation of headache as well as black tarry stools. Patient reports that she started having headache yesterday at 3 AM, woke her up out of her sleep. She states she took some Zyrtec and nasal spray and headache had resolved. She denies any fevers or chills. No chest pain or shortness of breath. No nausea or vomiting. She says that she does feel generally fatigued. She does note black tarry stools that have been ongoing for a week. No abdominal pain. She is not on any blood thinners she is not taking any iron. She has known heart murmur. No history of trauma, no neck stiffness. Approximately 30 minutes prior to arrival she says that the headache had restarted but it had come back on slowly. There is no associated vision changes, no numbness or weakness anywhere. Review of Systems   Constitutional:  Positive for fatigue. Negative for chills and fever. HENT:  Negative for congestion. Respiratory:  Negative for cough and shortness of breath. Cardiovascular:  Negative for chest pain. Gastrointestinal:  Negative for abdominal pain, diarrhea, nausea and vomiting. Black stools   Genitourinary:  Negative for difficulty urinating, dysuria and hematuria. Musculoskeletal:  Negative for back pain. Skin:  Negative for color change. Neurological:  Positive for headaches. All other systems reviewed and are negative. Physical Exam  Vitals and nursing note reviewed. Constitutional:       Appearance: Normal appearance. HENT:      Head: Normocephalic and atraumatic. Nose: Nose normal. No congestion. Mouth/Throat:      Mouth: Mucous membranes are moist.      Pharynx: Oropharynx is clear. Eyes:      Conjunctiva/sclera: Conjunctivae normal.      Pupils: Pupils are equal, round, and reactive to light. Neck:      Comments: No meningeal signs.   Cardiovascular:      Rate and Rhythm: Normal rate and

## 2023-09-27 NOTE — DISCHARGE INSTRUCTIONS
If You develop numbness, slurred speech, chest pain, shortness of breath, return or worsening of symptoms please come back to emergency department for evaluation

## 2023-09-28 LAB
EKG ATRIAL RATE: 67 BPM
EKG P-R INTERVAL: 186 MS
EKG Q-T INTERVAL: 466 MS
EKG QRS DURATION: 172 MS
EKG QTC CALCULATION (BAZETT): 492 MS
EKG R AXIS: -87 DEGREES
EKG T AXIS: 39 DEGREES
EKG VENTRICULAR RATE: 67 BPM

## 2023-09-28 PROCEDURE — 93010 ELECTROCARDIOGRAM REPORT: CPT | Performed by: INTERNAL MEDICINE

## 2023-10-04 ENCOUNTER — TELEPHONE (OUTPATIENT)
Dept: FAMILY MEDICINE CLINIC | Age: 73
End: 2023-10-04

## 2023-10-04 ENCOUNTER — NURSE ONLY (OUTPATIENT)
Dept: FAMILY MEDICINE CLINIC | Age: 73
End: 2023-10-04

## 2023-10-04 DIAGNOSIS — I10 ESSENTIAL HYPERTENSION: ICD-10-CM

## 2023-10-04 NOTE — TELEPHONE ENCOUNTER
Patient was seen at [facility] ER and then [address], if you can review her records in care everywhere and advise? ?

## 2023-10-05 ENCOUNTER — OFFICE VISIT (OUTPATIENT)
Dept: FAMILY MEDICINE CLINIC | Age: 73
End: 2023-10-05

## 2023-10-05 VITALS
OXYGEN SATURATION: 96 % | HEIGHT: 64 IN | DIASTOLIC BLOOD PRESSURE: 84 MMHG | SYSTOLIC BLOOD PRESSURE: 136 MMHG | BODY MASS INDEX: 34.89 KG/M2 | HEART RATE: 64 BPM | WEIGHT: 204.4 LBS | RESPIRATION RATE: 14 BRPM | TEMPERATURE: 98.2 F

## 2023-10-05 DIAGNOSIS — R09.81 NASAL CONGESTION: ICD-10-CM

## 2023-10-05 DIAGNOSIS — R51.9 INTRACTABLE EPISODIC HEADACHE, UNSPECIFIED HEADACHE TYPE: Primary | ICD-10-CM

## 2023-10-05 RX ORDER — ONDANSETRON 4 MG/1
4 TABLET, FILM COATED ORAL 3 TIMES DAILY PRN
Qty: 15 TABLET | Refills: 0 | Status: SHIPPED | OUTPATIENT
Start: 2023-10-05

## 2023-10-05 RX ORDER — MOMETASONE FUROATE 50 UG/1
2 SPRAY, METERED NASAL DAILY
COMMUNITY
Start: 2023-06-05

## 2023-10-05 RX ORDER — PROCHLORPERAZINE MALEATE 5 MG/1
TABLET ORAL
COMMUNITY
Start: 2023-10-02

## 2023-10-05 RX ORDER — AZELASTINE 1 MG/ML
2 SPRAY, METERED NASAL 2 TIMES DAILY
Qty: 120 ML | Refills: 1 | Status: SHIPPED | OUTPATIENT
Start: 2023-10-05

## 2023-10-06 ASSESSMENT — ENCOUNTER SYMPTOMS
SHORTNESS OF BREATH: 0
VOMITING: 0
EYE DISCHARGE: 0
CHEST TIGHTNESS: 0
ABDOMINAL PAIN: 0
SINUS PRESSURE: 0
DIARRHEA: 0
COUGH: 0
BLOOD IN STOOL: 0
NAUSEA: 1
SINUS PAIN: 0
EYE REDNESS: 0
EYE PAIN: 0

## 2023-11-22 ENCOUNTER — APPOINTMENT (OUTPATIENT)
Dept: GENERAL RADIOLOGY | Age: 73
End: 2023-11-22
Payer: MEDICARE

## 2023-11-22 ENCOUNTER — HOSPITAL ENCOUNTER (EMERGENCY)
Age: 73
Discharge: HOME | End: 2023-11-22
Attending: EMERGENCY MEDICINE
Payer: MEDICARE

## 2023-11-22 VITALS
TEMPERATURE: 99.2 F | BODY MASS INDEX: 35 KG/M2 | HEART RATE: 94 BPM | SYSTOLIC BLOOD PRESSURE: 155 MMHG | DIASTOLIC BLOOD PRESSURE: 74 MMHG | RESPIRATION RATE: 16 BRPM | HEIGHT: 64 IN | WEIGHT: 205 LBS | OXYGEN SATURATION: 92 %

## 2023-11-22 DIAGNOSIS — R06.02 SHORTNESS OF BREATH: ICD-10-CM

## 2023-11-22 DIAGNOSIS — J40 BRONCHITIS: Primary | ICD-10-CM

## 2023-11-22 LAB
ALBUMIN SERPL-MCNC: 4.5 G/DL (ref 3.5–5.2)
ALP SERPL-CCNC: 80 U/L (ref 35–104)
ALT SERPL-CCNC: 12 U/L (ref 0–32)
ANION GAP SERPL CALCULATED.3IONS-SCNC: 12 MMOL/L (ref 7–16)
AST SERPL-CCNC: 15 U/L (ref 0–31)
BACTERIA URNS QL MICRO: ABNORMAL
BASOPHILS # BLD: 0.04 K/UL (ref 0–0.2)
BASOPHILS NFR BLD: 0 % (ref 0–2)
BILIRUB SERPL-MCNC: 0.3 MG/DL (ref 0–1.2)
BILIRUB UR QL STRIP: NEGATIVE
BUN SERPL-MCNC: 35 MG/DL (ref 6–23)
CALCIUM SERPL-MCNC: 9.8 MG/DL (ref 8.6–10.2)
CHLORIDE SERPL-SCNC: 101 MMOL/L (ref 98–107)
CLARITY UR: CLEAR
CO2 SERPL-SCNC: 28 MMOL/L (ref 22–29)
COLOR UR: YELLOW
CREAT SERPL-MCNC: 1.6 MG/DL (ref 0.5–1)
EOSINOPHIL # BLD: 0.27 K/UL (ref 0.05–0.5)
EOSINOPHILS RELATIVE PERCENT: 2 % (ref 0–6)
EPI CELLS #/AREA URNS HPF: ABNORMAL /HPF
ERYTHROCYTE [DISTWIDTH] IN BLOOD BY AUTOMATED COUNT: 14.2 % (ref 11.5–15)
FLUAV RNA RESP QL NAA+PROBE: NOT DETECTED
FLUBV RNA RESP QL NAA+PROBE: NOT DETECTED
GFR SERPL CREATININE-BSD FRML MDRD: 34 ML/MIN/1.73M2
GLUCOSE SERPL-MCNC: 116 MG/DL (ref 74–99)
GLUCOSE UR STRIP-MCNC: NEGATIVE MG/DL
HCT VFR BLD AUTO: 45.7 % (ref 34–48)
HGB BLD-MCNC: 15 G/DL (ref 11.5–15.5)
HGB UR QL STRIP.AUTO: NEGATIVE
IMM GRANULOCYTES # BLD AUTO: 0.03 K/UL (ref 0–0.58)
IMM GRANULOCYTES NFR BLD: 0 % (ref 0–5)
KETONES UR STRIP-MCNC: NEGATIVE MG/DL
LEUKOCYTE ESTERASE UR QL STRIP: NEGATIVE
LYMPHOCYTES NFR BLD: 1.64 K/UL (ref 1.5–4)
LYMPHOCYTES RELATIVE PERCENT: 15 % (ref 20–42)
MCH RBC QN AUTO: 29.7 PG (ref 26–35)
MCHC RBC AUTO-ENTMCNC: 32.8 G/DL (ref 32–34.5)
MCV RBC AUTO: 90.5 FL (ref 80–99.9)
MONOCYTES NFR BLD: 0.7 K/UL (ref 0.1–0.95)
MONOCYTES NFR BLD: 6 % (ref 2–12)
NEUTROPHILS NFR BLD: 76 % (ref 43–80)
NEUTS SEG NFR BLD: 8.43 K/UL (ref 1.8–7.3)
NITRITE UR QL STRIP: NEGATIVE
PH UR STRIP: 5 [PH] (ref 5–9)
PLATELET # BLD AUTO: 171 K/UL (ref 130–450)
PMV BLD AUTO: 10.8 FL (ref 7–12)
POTASSIUM SERPL-SCNC: 4.1 MMOL/L (ref 3.5–5)
PROT SERPL-MCNC: 8 G/DL (ref 6.4–8.3)
PROT UR STRIP-MCNC: 100 MG/DL
RBC # BLD AUTO: 5.05 M/UL (ref 3.5–5.5)
RBC #/AREA URNS HPF: ABNORMAL /HPF
SARS-COV-2 RNA RESP QL NAA+PROBE: NOT DETECTED
SODIUM SERPL-SCNC: 141 MMOL/L (ref 132–146)
SOURCE: NORMAL
SP GR UR STRIP: >1.03 (ref 1–1.03)
SPECIMEN DESCRIPTION: NORMAL
SPECIMEN SOURCE: NORMAL
STREP A, MOLECULAR: NEGATIVE
TROPONIN I SERPL HS-MCNC: 21 NG/L (ref 0–9)
TROPONIN I SERPL HS-MCNC: 21 NG/L (ref 0–9)
UROBILINOGEN UR STRIP-ACNC: 0.2 EU/DL (ref 0–1)
WBC #/AREA URNS HPF: ABNORMAL /HPF
WBC OTHER # BLD: 11.1 K/UL (ref 4.5–11.5)

## 2023-11-22 PROCEDURE — 71046 X-RAY EXAM CHEST 2 VIEWS: CPT

## 2023-11-22 PROCEDURE — 84484 ASSAY OF TROPONIN QUANT: CPT

## 2023-11-22 PROCEDURE — 80053 COMPREHEN METABOLIC PANEL: CPT

## 2023-11-22 PROCEDURE — 87636 SARSCOV2 & INF A&B AMP PRB: CPT

## 2023-11-22 PROCEDURE — 81001 URINALYSIS AUTO W/SCOPE: CPT

## 2023-11-22 PROCEDURE — 87651 STREP A DNA AMP PROBE: CPT

## 2023-11-22 PROCEDURE — 87086 URINE CULTURE/COLONY COUNT: CPT

## 2023-11-22 PROCEDURE — 6370000000 HC RX 637 (ALT 250 FOR IP): Performed by: EMERGENCY MEDICINE

## 2023-11-22 PROCEDURE — 99285 EMERGENCY DEPT VISIT HI MDM: CPT

## 2023-11-22 PROCEDURE — 93005 ELECTROCARDIOGRAM TRACING: CPT | Performed by: NURSE PRACTITIONER

## 2023-11-22 PROCEDURE — 85025 COMPLETE CBC W/AUTO DIFF WBC: CPT

## 2023-11-22 RX ORDER — DOXYCYCLINE HYCLATE 100 MG
100 TABLET ORAL 2 TIMES DAILY
Qty: 20 TABLET | Refills: 0 | Status: SHIPPED | OUTPATIENT
Start: 2023-11-22 | End: 2023-12-02

## 2023-11-22 RX ORDER — BENZONATATE 100 MG/1
100 CAPSULE ORAL 3 TIMES DAILY PRN
Qty: 21 CAPSULE | Refills: 0 | Status: SHIPPED | OUTPATIENT
Start: 2023-11-22 | End: 2023-11-29

## 2023-11-22 RX ORDER — IPRATROPIUM BROMIDE AND ALBUTEROL SULFATE 2.5; .5 MG/3ML; MG/3ML
3 SOLUTION RESPIRATORY (INHALATION) ONCE
Status: COMPLETED | OUTPATIENT
Start: 2023-11-22 | End: 2023-11-22

## 2023-11-22 RX ORDER — DOXYCYCLINE HYCLATE 100 MG/1
100 CAPSULE ORAL ONCE
Status: COMPLETED | OUTPATIENT
Start: 2023-11-22 | End: 2023-11-22

## 2023-11-22 RX ORDER — IPRATROPIUM BROMIDE AND ALBUTEROL SULFATE 2.5; .5 MG/3ML; MG/3ML
SOLUTION RESPIRATORY (INHALATION)
Status: DISCONTINUED
Start: 2023-11-22 | End: 2023-11-23 | Stop reason: HOSPADM

## 2023-11-22 RX ADMIN — IPRATROPIUM BROMIDE AND ALBUTEROL SULFATE 3 DOSE: .5; 3 SOLUTION RESPIRATORY (INHALATION) at 21:18

## 2023-11-22 RX ADMIN — DOXYCYCLINE HYCLATE 100 MG: 100 CAPSULE ORAL at 22:45

## 2023-11-22 ASSESSMENT — PAIN - FUNCTIONAL ASSESSMENT
PAIN_FUNCTIONAL_ASSESSMENT: NONE - DENIES PAIN
PAIN_FUNCTIONAL_ASSESSMENT: 0-10

## 2023-11-23 ASSESSMENT — ENCOUNTER SYMPTOMS
ABDOMINAL PAIN: 0
VOMITING: 0
SHORTNESS OF BREATH: 1
NAUSEA: 0
EYE REDNESS: 0
COUGH: 1

## 2023-11-23 NOTE — ED NOTES
Patient ambulated with pulse oximetry. SpO2 maintained at 92% during ambulation. HR increased from 81-94. Patient C/O feeling tired & cold. DR Leonardo Raman notified.       Dhruv Maciel  11/22/23 [id number]

## 2023-11-24 LAB
EKG ATRIAL RATE: 78 BPM
EKG P-R INTERVAL: 178 MS
EKG Q-T INTERVAL: 440 MS
EKG QRS DURATION: 184 MS
EKG QTC CALCULATION (BAZETT): 501 MS
EKG R AXIS: -43 DEGREES
EKG T AXIS: 43 DEGREES
EKG VENTRICULAR RATE: 78 BPM
MICROORGANISM SPEC CULT: ABNORMAL
MICROORGANISM SPEC CULT: ABNORMAL
SPECIMEN DESCRIPTION: ABNORMAL

## 2023-11-24 PROCEDURE — 93010 ELECTROCARDIOGRAM REPORT: CPT | Performed by: INTERNAL MEDICINE

## 2023-11-24 NOTE — ED PROVIDER NOTES
Hematocrit 45.7 34.0 - 48.0 %    MCV 90.5 80.0 - 99.9 fL    MCH 29.7 26.0 - 35.0 pg    MCHC 32.8 32.0 - 34.5 g/dL    RDW 14.2 11.5 - 15.0 %    Platelets 353 496 - 969 k/uL    MPV 10.8 7.0 - 12.0 fL    Neutrophils % 76 43.0 - 80.0 %    Lymphocytes % 15 (L) 20.0 - 42.0 %    Monocytes % 6 2.0 - 12.0 %    Eosinophils % 2 0 - 6 %    Basophils % 0 0.0 - 2.0 %    Immature Granulocytes 0 0.0 - 5.0 %    Neutrophils Absolute 8.43 (H) 1.80 - 7.30 k/uL    Lymphocytes Absolute 1.64 1.50 - 4.00 k/uL    Monocytes Absolute 0.70 0.10 - 0.95 k/uL    Eosinophils Absolute 0.27 0.05 - 0.50 k/uL    Basophils Absolute 0.04 0.00 - 0.20 k/uL    Absolute Immature Granulocyte 0.03 0.00 - 0.58 k/uL   CMP   Result Value Ref Range    Sodium 141 132 - 146 mmol/L    Potassium 4.1 3.5 - 5.0 mmol/L    Chloride 101 98 - 107 mmol/L    CO2 28 22 - 29 mmol/L    Anion Gap 12 7 - 16 mmol/L    Glucose 116 (H) 74 - 99 mg/dL    BUN 35 (H) 6 - 23 mg/dL    Creatinine 1.6 (H) 0.50 - 1.00 mg/dL    Est, Glom Filt Rate 34 (L) >60 mL/min/1.73m2    Calcium 9.8 8.6 - 10.2 mg/dL    Total Protein 8.0 6.4 - 8.3 g/dL    Albumin 4.5 3.5 - 5.2 g/dL    Total Bilirubin 0.3 0.0 - 1.2 mg/dL    Alkaline Phosphatase 80 35 - 104 U/L    ALT 12 0 - 32 U/L    AST 15 0 - 31 U/L   Troponin   Result Value Ref Range    Troponin, High Sensitivity 21 (H) 0 - 9 ng/L   Urinalysis with Microscopic   Result Value Ref Range    Color, UA Yellow Yellow    Turbidity UA Clear Clear    Glucose, Ur NEGATIVE NEGATIVE mg/dL    Bilirubin Urine NEGATIVE NEGATIVE    Ketones, Urine NEGATIVE NEGATIVE mg/dL    Specific Gravity, UA >1.030 (H) 1.005 - 1.030    Urine Hgb NEGATIVE NEGATIVE    pH, UA 5.0 5.0 - 9.0    Protein,  (A) NEGATIVE mg/dL    Urobilinogen, Urine 0.2 0.0 - 1.0 EU/dL    Nitrite, Urine NEGATIVE NEGATIVE    Leukocyte Esterase, Urine NEGATIVE NEGATIVE    WBC, UA 0 TO 5 0 TO 5 /HPF    RBC, UA 0 TO 2 0 TO 2 /HPF    Epithelial Cells UA 3 to 5 /HPF    Bacteria, UA 1+ (A) None   Troponin

## 2023-11-29 ENCOUNTER — TELEPHONE (OUTPATIENT)
Dept: FAMILY MEDICINE CLINIC | Age: 73
End: 2023-11-29

## 2023-11-29 NOTE — TELEPHONE ENCOUNTER
[address] faxed over a clearance paper for surgery and is waiting for it to be signed. Phone is [phone]. Option 5.

## 2023-11-30 ENCOUNTER — OFFICE VISIT (OUTPATIENT)
Dept: FAMILY MEDICINE CLINIC | Age: 73
End: 2023-11-30
Payer: MEDICARE

## 2023-11-30 VITALS
SYSTOLIC BLOOD PRESSURE: 139 MMHG | WEIGHT: 201.28 LBS | HEART RATE: 75 BPM | BODY MASS INDEX: 34.36 KG/M2 | RESPIRATION RATE: 16 BRPM | TEMPERATURE: 97 F | OXYGEN SATURATION: 94 % | HEIGHT: 64 IN | DIASTOLIC BLOOD PRESSURE: 77 MMHG

## 2023-11-30 DIAGNOSIS — Z01.818 ENCOUNTER FOR PREOPERATIVE ASSESSMENT: Primary | ICD-10-CM

## 2023-11-30 PROCEDURE — G8427 DOCREV CUR MEDS BY ELIG CLIN: HCPCS

## 2023-11-30 PROCEDURE — G8400 PT W/DXA NO RESULTS DOC: HCPCS

## 2023-11-30 PROCEDURE — G8417 CALC BMI ABV UP PARAM F/U: HCPCS

## 2023-11-30 PROCEDURE — 1123F ACP DISCUSS/DSCN MKR DOCD: CPT

## 2023-11-30 PROCEDURE — 99213 OFFICE O/P EST LOW 20 MIN: CPT

## 2023-11-30 PROCEDURE — 3017F COLORECTAL CA SCREEN DOC REV: CPT

## 2023-11-30 PROCEDURE — 3075F SYST BP GE 130 - 139MM HG: CPT

## 2023-11-30 PROCEDURE — 1090F PRES/ABSN URINE INCON ASSESS: CPT

## 2023-11-30 PROCEDURE — G8484 FLU IMMUNIZE NO ADMIN: HCPCS

## 2023-11-30 PROCEDURE — 1036F TOBACCO NON-USER: CPT

## 2023-11-30 PROCEDURE — 3078F DIAST BP <80 MM HG: CPT

## 2023-11-30 RX ORDER — CEPHALEXIN 500 MG/1
CAPSULE ORAL
COMMUNITY
Start: 2023-11-28

## 2023-12-19 ENCOUNTER — TELEPHONE (OUTPATIENT)
Dept: FAMILY MEDICINE CLINIC | Age: 73
End: 2023-12-19

## 2023-12-19 NOTE — TELEPHONE ENCOUNTER
Patient's surgery date was changed from December to January 2nd. Her pre op assessment from November is out of date. They are sending a new form, patient will need another appointment for pre op assessment.

## 2023-12-20 NOTE — TELEPHONE ENCOUNTER
Had left eye done I December, getting second eye done 01.02, form needs filled out but [place] they do not require another appt. Please advise.

## 2023-12-22 PROBLEM — K57.32 DIVERTICULITIS OF COLON: Status: ACTIVE | Noted: 2023-12-22

## 2023-12-22 PROBLEM — R10.9 ABDOMINAL PAIN: Status: ACTIVE | Noted: 2023-12-22

## 2023-12-26 ENCOUNTER — TELEPHONE (OUTPATIENT)
Dept: FAMILY MEDICINE CLINIC | Age: 73
End: 2023-12-26

## 2023-12-26 NOTE — TELEPHONE ENCOUNTER
Patient was seen in the [place] ED and given medication for diverticulitis.   She states that the medication is too strong and would like someone to call her to advise her on how to take the medication.

## 2023-12-29 NOTE — TELEPHONE ENCOUNTER
Pt called [facility] and is having surgery for the divert. So the cataract surgery is not happening for now.

## 2024-01-24 ENCOUNTER — OFFICE VISIT (OUTPATIENT)
Dept: FAMILY MEDICINE CLINIC | Age: 74
End: 2024-01-24
Payer: MEDICARE

## 2024-01-24 VITALS
HEIGHT: 64 IN | BODY MASS INDEX: 34.83 KG/M2 | HEART RATE: 83 BPM | OXYGEN SATURATION: 95 % | RESPIRATION RATE: 18 BRPM | TEMPERATURE: 97.5 F | SYSTOLIC BLOOD PRESSURE: 145 MMHG | WEIGHT: 204 LBS | DIASTOLIC BLOOD PRESSURE: 90 MMHG

## 2024-01-24 DIAGNOSIS — K57.32 DIVERTICULITIS OF COLON: Primary | ICD-10-CM

## 2024-01-24 PROCEDURE — G8417 CALC BMI ABV UP PARAM F/U: HCPCS

## 2024-01-24 PROCEDURE — G8427 DOCREV CUR MEDS BY ELIG CLIN: HCPCS

## 2024-01-24 PROCEDURE — 99213 OFFICE O/P EST LOW 20 MIN: CPT

## 2024-01-24 PROCEDURE — 3080F DIAST BP >= 90 MM HG: CPT

## 2024-01-24 PROCEDURE — G8484 FLU IMMUNIZE NO ADMIN: HCPCS

## 2024-01-24 PROCEDURE — 1123F ACP DISCUSS/DSCN MKR DOCD: CPT

## 2024-01-24 PROCEDURE — G8400 PT W/DXA NO RESULTS DOC: HCPCS

## 2024-01-24 PROCEDURE — 3017F COLORECTAL CA SCREEN DOC REV: CPT

## 2024-01-24 PROCEDURE — 1090F PRES/ABSN URINE INCON ASSESS: CPT

## 2024-01-24 PROCEDURE — 3077F SYST BP >= 140 MM HG: CPT

## 2024-01-24 PROCEDURE — 1036F TOBACCO NON-USER: CPT

## 2024-01-24 ASSESSMENT — PATIENT HEALTH QUESTIONNAIRE - PHQ9
SUM OF ALL RESPONSES TO PHQ9 QUESTIONS 1 & 2: 0
SUM OF ALL RESPONSES TO PHQ QUESTIONS 1-9: 0
2. FEELING DOWN, DEPRESSED OR HOPELESS: 0
SUM OF ALL RESPONSES TO PHQ QUESTIONS 1-9: 0
1. LITTLE INTEREST OR PLEASURE IN DOING THINGS: 0

## 2024-01-24 NOTE — PROGRESS NOTES
[facility]    Subjective:  Follow up  Hx of diverticulitis  Had incomplete scope with [facility], lesion noted on CT      ROS otherwise negative     Past medical, surgical, family and social history were reviewed, non-contributory, and unchanged unless otherwise stated.    Objective:    BP (!) 176/95   Pulse 83   Temp 97.5 °F (36.4 °C)   Resp 18   Ht 1.626 m (5' 4.02\")   Wt 92.5 kg (204 lb)   SpO2 95%   BMI 35.00 kg/m²     Exam is as noted by resident with the following changes, additions or corrections:    General:  NAD; alert & oriented x 3   Heart:  RRR, no murmurs, gallops, or rubs.  Lungs:  CTA bilaterally, no wheeze, rales or rhonchi  Abd: bowel sounds present, nontender, nondistended, no masses  Extrem:  No clubbing, cyanosis, or edema    Assessment/Plan:  Diverticulosis  Advised to call [facility] to schedule coonsocopy, also has GI surgery follow up appt      Attending Physician Statement  I have reviewed the chart, including any radiology or labs. I have discussed the case, including pertinent history and exam findings with the resident.  I agree with the assessment, plan and orders as documented by the resident.  Please refer to the resident note for additional information.      Electronically signed by Nathaly Edwards MD on 1/24/2024 at 2:10 PM

## 2024-01-24 NOTE — PROGRESS NOTES
Subjective:  Lula Conn is a 73 y.o. female with chief complaint of Diverticulosis      HPI:  HPI    Here to discuss diverticulitis  Patient had 2 episodes in the last 3 months  First round of abx - finished  Second round of of divertics -  did not finish dt s/e  Went to [facility] for cscope - aborted procedure d/t poor prep.  Discussion about possible sigmoid colectomy  Needed new colonoscopy for further evaluation.     GI doctor - Tony Tse (GI)   GI surgeon - Dr. Kaye (gI surgeon)     CT abd  Presence of a 5.5 cm long segment thickening of bowel wall where  diverticulitis is observed in the left side of the transverse colon,  recommended follow-up study following clinical treatment for diverticulitis  and also correlation with colonoscopy to excluded an underlying encasing  lesion.  Also consider further evaluation with colonoscopy after acute  diverticulitis inflammatory reaction has subsided.    Currently, no new symptoms  +BM, no blood, mucus, no abd pain    ROS:  Review of Systems   Constitutional:  Negative for chills and fever.   HENT:  Negative for congestion, rhinorrhea and sore throat.    Eyes:  Negative for pain and visual disturbance.   Respiratory:  Negative for shortness of breath and wheezing.    Cardiovascular:  Negative for chest pain and palpitations.   Gastrointestinal:  Negative for abdominal pain, blood in stool, diarrhea, nausea and vomiting.   Genitourinary:  Negative for dysuria and hematuria.   Musculoskeletal:  Negative for arthralgias and myalgias.   Skin:  Negative for rash.   Neurological:  Negative for dizziness and headaches.       Objective:  Vitals:    01/24/24 1329 01/24/24 1446   BP: (!) 176/95 (!) 145/90   Pulse: 83    Resp: 18    Temp: 97.5 °F (36.4 °C)    SpO2: 95%    Weight: 92.5 kg (204 lb)    Height: 1.626 m (5' 4.02\")      Physical Exam  Vitals and nursing note reviewed.   Constitutional:       General: She is not in acute distress.     Appearance: Normal

## 2024-03-30 ENCOUNTER — APPOINTMENT (OUTPATIENT)
Dept: CT IMAGING | Age: 74
DRG: 309 | End: 2024-03-30
Payer: MEDICARE

## 2024-03-30 ENCOUNTER — HOSPITAL ENCOUNTER (INPATIENT)
Age: 74
LOS: 3 days | Discharge: HOME | DRG: 309 | End: 2024-04-02
Attending: STUDENT IN AN ORGANIZED HEALTH CARE EDUCATION/TRAINING PROGRAM | Admitting: INTERNAL MEDICINE
Payer: MEDICARE

## 2024-03-30 ENCOUNTER — APPOINTMENT (OUTPATIENT)
Dept: GENERAL RADIOLOGY | Age: 74
DRG: 309 | End: 2024-03-30
Payer: MEDICARE

## 2024-03-30 DIAGNOSIS — I48.92 PAROXYSMAL ATRIAL FLUTTER (HCC): Primary | ICD-10-CM

## 2024-03-30 DIAGNOSIS — I48.91 ATRIAL FIBRILLATION WITH RAPID VENTRICULAR RESPONSE (HCC): ICD-10-CM

## 2024-03-30 DIAGNOSIS — N17.9 AKI (ACUTE KIDNEY INJURY) (HCC): ICD-10-CM

## 2024-03-30 LAB
ALBUMIN SERPL-MCNC: 4.4 G/DL (ref 3.5–5.2)
ALP SERPL-CCNC: 73 U/L (ref 35–104)
ALT SERPL-CCNC: 15 U/L (ref 0–32)
ANION GAP SERPL CALCULATED.3IONS-SCNC: 14 MMOL/L (ref 7–16)
AST SERPL-CCNC: 16 U/L (ref 0–31)
BASOPHILS # BLD: 0.06 K/UL (ref 0–0.2)
BASOPHILS NFR BLD: 1 % (ref 0–2)
BILIRUB SERPL-MCNC: 0.2 MG/DL (ref 0–1.2)
BUN SERPL-MCNC: 43 MG/DL (ref 6–23)
CALCIUM SERPL-MCNC: 9.8 MG/DL (ref 8.6–10.2)
CHLORIDE SERPL-SCNC: 101 MMOL/L (ref 98–107)
CO2 SERPL-SCNC: 21 MMOL/L (ref 22–29)
CREAT SERPL-MCNC: 1.6 MG/DL (ref 0.5–1)
D DIMER: 829 NG/ML DDU (ref 0–232)
EOSINOPHIL # BLD: 0.22 K/UL (ref 0.05–0.5)
EOSINOPHILS RELATIVE PERCENT: 3 % (ref 0–6)
ERYTHROCYTE [DISTWIDTH] IN BLOOD BY AUTOMATED COUNT: 13.8 % (ref 11.5–15)
ERYTHROCYTE [DISTWIDTH] IN BLOOD BY AUTOMATED COUNT: 13.9 % (ref 11.5–15)
ERYTHROCYTE [DISTWIDTH] IN BLOOD BY AUTOMATED COUNT: 13.9 % (ref 11.5–15)
GFR SERPL CREATININE-BSD FRML MDRD: 34 ML/MIN/1.73M2
GLUCOSE SERPL-MCNC: 83 MG/DL (ref 74–99)
HCT VFR BLD AUTO: 43.1 % (ref 34–48)
HCT VFR BLD AUTO: 43.2 % (ref 34–48)
HCT VFR BLD AUTO: 45.1 % (ref 34–48)
HGB BLD-MCNC: 14.4 G/DL (ref 11.5–15.5)
HGB BLD-MCNC: 14.6 G/DL (ref 11.5–15.5)
HGB BLD-MCNC: 15.1 G/DL (ref 11.5–15.5)
IMM GRANULOCYTES # BLD AUTO: <0.03 K/UL (ref 0–0.58)
IMM GRANULOCYTES NFR BLD: 0 % (ref 0–5)
LYMPHOCYTES NFR BLD: 1.67 K/UL (ref 1.5–4)
LYMPHOCYTES RELATIVE PERCENT: 23 % (ref 20–42)
MAGNESIUM SERPL-MCNC: 1.9 MG/DL (ref 1.6–2.6)
MCH RBC QN AUTO: 29.4 PG (ref 26–35)
MCH RBC QN AUTO: 29.7 PG (ref 26–35)
MCH RBC QN AUTO: 30 PG (ref 26–35)
MCHC RBC AUTO-ENTMCNC: 33.4 G/DL (ref 32–34.5)
MCHC RBC AUTO-ENTMCNC: 33.5 G/DL (ref 32–34.5)
MCHC RBC AUTO-ENTMCNC: 33.8 G/DL (ref 32–34.5)
MCV RBC AUTO: 88.1 FL (ref 80–99.9)
MCV RBC AUTO: 88.6 FL (ref 80–99.9)
MCV RBC AUTO: 88.9 FL (ref 80–99.9)
MONOCYTES NFR BLD: 0.57 K/UL (ref 0.1–0.95)
MONOCYTES NFR BLD: 8 % (ref 2–12)
NEUTROPHILS NFR BLD: 65 % (ref 43–80)
NEUTS SEG NFR BLD: 4.61 K/UL (ref 1.8–7.3)
PARTIAL THROMBOPLASTIN TIME: 29 SEC (ref 24.5–35.1)
PARTIAL THROMBOPLASTIN TIME: >240 SEC (ref 24.5–35.1)
PLATELET # BLD AUTO: 177 K/UL (ref 130–450)
PLATELET # BLD AUTO: 180 K/UL (ref 130–450)
PLATELET # BLD AUTO: 190 K/UL (ref 130–450)
PMV BLD AUTO: 10.3 FL (ref 7–12)
PMV BLD AUTO: 10.5 FL (ref 7–12)
PMV BLD AUTO: 10.6 FL (ref 7–12)
POTASSIUM SERPL-SCNC: 4.4 MMOL/L (ref 3.5–5)
PROT SERPL-MCNC: 8 G/DL (ref 6.4–8.3)
RBC # BLD AUTO: 4.86 M/UL (ref 3.5–5.5)
RBC # BLD AUTO: 4.89 M/UL (ref 3.5–5.5)
RBC # BLD AUTO: 5.09 M/UL (ref 3.5–5.5)
SODIUM SERPL-SCNC: 136 MMOL/L (ref 132–146)
TROPONIN I SERPL HS-MCNC: 18 NG/L (ref 0–9)
TSH SERPL DL<=0.05 MIU/L-ACNC: 4.23 UIU/ML (ref 0.27–4.2)
WBC OTHER # BLD: 7.1 K/UL (ref 4.5–11.5)
WBC OTHER # BLD: 7.5 K/UL (ref 4.5–11.5)
WBC OTHER # BLD: 9.2 K/UL (ref 4.5–11.5)

## 2024-03-30 PROCEDURE — 85730 THROMBOPLASTIN TIME PARTIAL: CPT

## 2024-03-30 PROCEDURE — 84443 ASSAY THYROID STIM HORMONE: CPT

## 2024-03-30 PROCEDURE — 85027 COMPLETE CBC AUTOMATED: CPT

## 2024-03-30 PROCEDURE — 83735 ASSAY OF MAGNESIUM: CPT

## 2024-03-30 PROCEDURE — 6360000002 HC RX W HCPCS: Performed by: STUDENT IN AN ORGANIZED HEALTH CARE EDUCATION/TRAINING PROGRAM

## 2024-03-30 PROCEDURE — 6370000000 HC RX 637 (ALT 250 FOR IP): Performed by: STUDENT IN AN ORGANIZED HEALTH CARE EDUCATION/TRAINING PROGRAM

## 2024-03-30 PROCEDURE — 36415 COLL VENOUS BLD VENIPUNCTURE: CPT

## 2024-03-30 PROCEDURE — 84484 ASSAY OF TROPONIN QUANT: CPT

## 2024-03-30 PROCEDURE — 6360000004 HC RX CONTRAST MEDICATION: Performed by: RADIOLOGY

## 2024-03-30 PROCEDURE — 85025 COMPLETE CBC W/AUTO DIFF WBC: CPT

## 2024-03-30 PROCEDURE — 85379 FIBRIN DEGRADATION QUANT: CPT

## 2024-03-30 PROCEDURE — 6370000000 HC RX 637 (ALT 250 FOR IP): Performed by: INTERNAL MEDICINE

## 2024-03-30 PROCEDURE — 99285 EMERGENCY DEPT VISIT HI MDM: CPT

## 2024-03-30 PROCEDURE — 6360000002 HC RX W HCPCS: Performed by: INTERNAL MEDICINE

## 2024-03-30 PROCEDURE — 93005 ELECTROCARDIOGRAM TRACING: CPT | Performed by: STUDENT IN AN ORGANIZED HEALTH CARE EDUCATION/TRAINING PROGRAM

## 2024-03-30 PROCEDURE — 71045 X-RAY EXAM CHEST 1 VIEW: CPT

## 2024-03-30 PROCEDURE — 99223 1ST HOSP IP/OBS HIGH 75: CPT | Performed by: INTERNAL MEDICINE

## 2024-03-30 PROCEDURE — 96365 THER/PROPH/DIAG IV INF INIT: CPT

## 2024-03-30 PROCEDURE — 71275 CT ANGIOGRAPHY CHEST: CPT

## 2024-03-30 PROCEDURE — 96374 THER/PROPH/DIAG INJ IV PUSH: CPT

## 2024-03-30 PROCEDURE — 96366 THER/PROPH/DIAG IV INF ADDON: CPT

## 2024-03-30 PROCEDURE — 2140000000 HC CCU INTERMEDIATE R&B

## 2024-03-30 PROCEDURE — 80053 COMPREHEN METABOLIC PANEL: CPT

## 2024-03-30 RX ORDER — MAGNESIUM SULFATE IN WATER 40 MG/ML
2000 INJECTION, SOLUTION INTRAVENOUS PRN
Status: DISCONTINUED | OUTPATIENT
Start: 2024-03-30 | End: 2024-04-02 | Stop reason: HOSPADM

## 2024-03-30 RX ORDER — METOPROLOL TARTRATE 50 MG/1
25 TABLET, FILM COATED ORAL ONCE
Status: COMPLETED | OUTPATIENT
Start: 2024-03-30 | End: 2024-03-30

## 2024-03-30 RX ORDER — HEPARIN SODIUM 1000 [USP'U]/ML
4000 INJECTION, SOLUTION INTRAVENOUS; SUBCUTANEOUS PRN
Status: DISCONTINUED | OUTPATIENT
Start: 2024-03-30 | End: 2024-03-31 | Stop reason: ALTCHOICE

## 2024-03-30 RX ORDER — SODIUM CHLORIDE 9 MG/ML
INJECTION, SOLUTION INTRAVENOUS PRN
Status: DISCONTINUED | OUTPATIENT
Start: 2024-03-30 | End: 2024-04-02 | Stop reason: HOSPADM

## 2024-03-30 RX ORDER — SODIUM CHLORIDE 0.9 % (FLUSH) 0.9 %
5-40 SYRINGE (ML) INJECTION EVERY 12 HOURS SCHEDULED
Status: DISCONTINUED | OUTPATIENT
Start: 2024-03-30 | End: 2024-04-02 | Stop reason: HOSPADM

## 2024-03-30 RX ORDER — FUROSEMIDE 40 MG/1
40 TABLET ORAL NIGHTLY
Status: DISCONTINUED | OUTPATIENT
Start: 2024-03-30 | End: 2024-04-02

## 2024-03-30 RX ORDER — HEPARIN SODIUM 1000 [USP'U]/ML
40 INJECTION, SOLUTION INTRAVENOUS; SUBCUTANEOUS PRN
Status: DISCONTINUED | OUTPATIENT
Start: 2024-03-30 | End: 2024-03-30

## 2024-03-30 RX ORDER — ONDANSETRON 2 MG/ML
4 INJECTION INTRAMUSCULAR; INTRAVENOUS EVERY 6 HOURS PRN
Status: DISCONTINUED | OUTPATIENT
Start: 2024-03-30 | End: 2024-03-30

## 2024-03-30 RX ORDER — ONDANSETRON 4 MG/1
4 TABLET, ORALLY DISINTEGRATING ORAL EVERY 8 HOURS PRN
Status: DISCONTINUED | OUTPATIENT
Start: 2024-03-30 | End: 2024-03-30

## 2024-03-30 RX ORDER — ALBUTEROL SULFATE 2.5 MG/3ML
2.5 SOLUTION RESPIRATORY (INHALATION) EVERY 6 HOURS PRN
Status: DISCONTINUED | OUTPATIENT
Start: 2024-03-30 | End: 2024-04-02 | Stop reason: HOSPADM

## 2024-03-30 RX ORDER — ACETAMINOPHEN 325 MG/1
650 TABLET ORAL EVERY 6 HOURS PRN
Status: CANCELLED | OUTPATIENT
Start: 2024-03-30

## 2024-03-30 RX ORDER — LISINOPRIL 5 MG/1
5 TABLET ORAL DAILY
Status: ON HOLD | COMMUNITY
End: 2024-04-02 | Stop reason: HOSPADM

## 2024-03-30 RX ORDER — HEPARIN SODIUM 10000 [USP'U]/100ML
5-30 INJECTION, SOLUTION INTRAVENOUS CONTINUOUS
Status: DISCONTINUED | OUTPATIENT
Start: 2024-03-30 | End: 2024-03-30

## 2024-03-30 RX ORDER — HEPARIN SODIUM 1000 [USP'U]/ML
80 INJECTION, SOLUTION INTRAVENOUS; SUBCUTANEOUS PRN
Status: DISCONTINUED | OUTPATIENT
Start: 2024-03-30 | End: 2024-03-30

## 2024-03-30 RX ORDER — ENOXAPARIN SODIUM 100 MG/ML
40 INJECTION SUBCUTANEOUS DAILY
Status: CANCELLED | OUTPATIENT
Start: 2024-03-30

## 2024-03-30 RX ORDER — POLYETHYLENE GLYCOL 3350 17 G/17G
17 POWDER, FOR SOLUTION ORAL DAILY PRN
Status: DISCONTINUED | OUTPATIENT
Start: 2024-03-30 | End: 2024-04-02 | Stop reason: HOSPADM

## 2024-03-30 RX ORDER — ACETAMINOPHEN 650 MG/1
650 SUPPOSITORY RECTAL EVERY 6 HOURS PRN
Status: CANCELLED | OUTPATIENT
Start: 2024-03-30

## 2024-03-30 RX ORDER — HEPARIN SODIUM 1000 [USP'U]/ML
2000 INJECTION, SOLUTION INTRAVENOUS; SUBCUTANEOUS PRN
Status: DISCONTINUED | OUTPATIENT
Start: 2024-03-30 | End: 2024-03-31 | Stop reason: ALTCHOICE

## 2024-03-30 RX ORDER — PROCHLORPERAZINE EDISYLATE 5 MG/ML
10 INJECTION INTRAMUSCULAR; INTRAVENOUS EVERY 6 HOURS PRN
Status: DISCONTINUED | OUTPATIENT
Start: 2024-03-30 | End: 2024-04-02 | Stop reason: HOSPADM

## 2024-03-30 RX ORDER — SODIUM CHLORIDE 0.9 % (FLUSH) 0.9 %
5-40 SYRINGE (ML) INJECTION PRN
Status: DISCONTINUED | OUTPATIENT
Start: 2024-03-30 | End: 2024-04-02 | Stop reason: HOSPADM

## 2024-03-30 RX ORDER — HEPARIN SODIUM 10000 [USP'U]/100ML
5-30 INJECTION, SOLUTION INTRAVENOUS CONTINUOUS
Status: DISCONTINUED | OUTPATIENT
Start: 2024-03-30 | End: 2024-03-31

## 2024-03-30 RX ORDER — POTASSIUM CHLORIDE 20 MEQ/1
40 TABLET, EXTENDED RELEASE ORAL PRN
Status: DISCONTINUED | OUTPATIENT
Start: 2024-03-30 | End: 2024-04-02 | Stop reason: HOSPADM

## 2024-03-30 RX ORDER — LISINOPRIL 5 MG/1
5 TABLET ORAL DAILY
Status: DISCONTINUED | OUTPATIENT
Start: 2024-03-30 | End: 2024-04-01

## 2024-03-30 RX ORDER — PROCHLORPERAZINE MALEATE 10 MG
10 TABLET ORAL EVERY 8 HOURS PRN
Status: DISCONTINUED | OUTPATIENT
Start: 2024-03-30 | End: 2024-04-02 | Stop reason: HOSPADM

## 2024-03-30 RX ORDER — HEPARIN SODIUM 1000 [USP'U]/ML
80 INJECTION, SOLUTION INTRAVENOUS; SUBCUTANEOUS ONCE
Status: COMPLETED | OUTPATIENT
Start: 2024-03-30 | End: 2024-03-30

## 2024-03-30 RX ORDER — HEPARIN SODIUM 1000 [USP'U]/ML
4000 INJECTION, SOLUTION INTRAVENOUS; SUBCUTANEOUS ONCE
Status: DISCONTINUED | OUTPATIENT
Start: 2024-03-30 | End: 2024-03-31

## 2024-03-30 RX ORDER — MAGNESIUM SULFATE IN WATER 40 MG/ML
2000 INJECTION, SOLUTION INTRAVENOUS ONCE
Status: COMPLETED | OUTPATIENT
Start: 2024-03-30 | End: 2024-03-30

## 2024-03-30 RX ORDER — POTASSIUM CHLORIDE 7.45 MG/ML
10 INJECTION INTRAVENOUS PRN
Status: DISCONTINUED | OUTPATIENT
Start: 2024-03-30 | End: 2024-04-02 | Stop reason: HOSPADM

## 2024-03-30 RX ADMIN — HEPARIN SODIUM 18 UNITS/KG/HR: 10000 INJECTION, SOLUTION INTRAVENOUS at 16:21

## 2024-03-30 RX ADMIN — FUROSEMIDE 40 MG: 40 TABLET ORAL at 21:16

## 2024-03-30 RX ADMIN — IOPAMIDOL 75 ML: 755 INJECTION, SOLUTION INTRAVENOUS at 13:36

## 2024-03-30 RX ADMIN — HEPARIN SODIUM 18 UNITS/KG/HR: 10000 INJECTION, SOLUTION INTRAVENOUS at 16:33

## 2024-03-30 RX ADMIN — HEPARIN SODIUM 7530 UNITS: 1000 INJECTION INTRAVENOUS; SUBCUTANEOUS at 13:48

## 2024-03-30 RX ADMIN — METOPROLOL TARTRATE 25 MG: 50 TABLET ORAL at 13:45

## 2024-03-30 RX ADMIN — MAGNESIUM SULFATE HEPTAHYDRATE 2000 MG: 40 INJECTION, SOLUTION INTRAVENOUS at 12:42

## 2024-03-30 RX ADMIN — HEPARIN SODIUM 18 UNITS/KG/HR: 10000 INJECTION, SOLUTION INTRAVENOUS at 13:56

## 2024-03-30 ASSESSMENT — PAIN SCALES - GENERAL: PAINLEVEL_OUTOF10: 0

## 2024-03-30 ASSESSMENT — PAIN - FUNCTIONAL ASSESSMENT: PAIN_FUNCTIONAL_ASSESSMENT: NONE - DENIES PAIN

## 2024-03-30 NOTE — FLOWSHEET NOTE
Patient arrived to unit with the following belongings:   03/30/24 1516   Belongings   Dental Appliances None   Vision - Corrective Lenses Eyeglasses;At bedside   Hearing Aid None   Clothing Pants;Undergarments;At bedside   Jewelry Ring;At bedside  (2 rings)   Body Piercings Removed N/A   Electronic Devices Cell Phone;At bedside   Weapons (Notify Protective Services/Security) None   Home Medications None   Valuables Given To Patient   Provide Name(s) of Who Valuable(s) Were Given To eugene

## 2024-03-30 NOTE — H&P
heparin drip, cardiology consult.  2.  Coronary artery disease status post bypass surgery: Patient follow cardiologist at [facility], continue current medication.  3.  Aortic valve replacement: Bovine valve, patient following in [facility].  4.  Diastolic heart failure with leg edema: Patient take furosemide 2 mg p.o. daily.  5.  Hypertension: Continue lisinopril 40 mg p.o. daily.  Monitor blood pressure    Code Status: Full  DVT prophylaxis: On heparin drip now.      NOTE: This report was transcribed using voice recognition software. Every effort was made to ensure accuracy; however, inadvertent computerized transcription errors may be present.  Electronically signed by ALEXANDER WADE MD on 3/30/2024 at 4:16 PM

## 2024-03-30 NOTE — ED PROVIDER NOTES
HPI   This is a 73-year-old female patient presents emergency department for evaluation of palpitations.  Patient reporting that her heart rate is intermittently racing.  It started yesterday and has been coming and going today.  She feels a pressure sensation in the middle of her chest.  She does note history of open heart surgery and valve replacement at [facility].  She denies any vomiting diarrhea abdominal pain.  No black or bloody stools.  She is currently not on any anticoagulation.  Review of Systems   Constitutional:  Negative for chills and fever.   HENT:  Negative for congestion.    Respiratory:  Negative for cough and shortness of breath.    Cardiovascular:  Positive for chest pain and palpitations.   Gastrointestinal:  Negative for abdominal pain, diarrhea, nausea and vomiting.   Genitourinary:  Negative for difficulty urinating, dysuria and hematuria.   Musculoskeletal:  Negative for back pain.   Skin:  Negative for color change.   All other systems reviewed and are negative.       Physical Exam  Vitals and nursing note reviewed.   Constitutional:       Appearance: Normal appearance.   HENT:      Head: Normocephalic and atraumatic.      Nose: Nose normal. No congestion.      Mouth/Throat:      Mouth: Mucous membranes are moist.      Pharynx: Oropharynx is clear.   Eyes:      Conjunctiva/sclera: Conjunctivae normal.      Pupils: Pupils are equal, round, and reactive to light.   Cardiovascular:      Rate and Rhythm: Regular rhythm. Tachycardia present.      Pulses: Normal pulses.      Heart sounds: Normal heart sounds.      Comments: 2+ symmetric radial pulse bilaterally.  Pulmonary:      Effort: Pulmonary effort is normal. No respiratory distress.      Breath sounds: Normal breath sounds.   Abdominal:      General: There is no distension.      Tenderness: There is no abdominal tenderness.   Musculoskeletal:         General: Normal range of motion.      Cervical back: Normal range of motion and

## 2024-03-31 PROBLEM — I48.92 PAROXYSMAL ATRIAL FLUTTER (HCC): Status: ACTIVE | Noted: 2024-03-31

## 2024-03-31 LAB
ANION GAP SERPL CALCULATED.3IONS-SCNC: 15 MMOL/L (ref 7–16)
BASOPHILS # BLD: 0.06 K/UL (ref 0–0.2)
BASOPHILS NFR BLD: 1 % (ref 0–2)
BNP SERPL-MCNC: 458 PG/ML (ref 0–125)
BUN SERPL-MCNC: 49 MG/DL (ref 6–23)
CALCIUM SERPL-MCNC: 9.2 MG/DL (ref 8.6–10.2)
CHLORIDE SERPL-SCNC: 100 MMOL/L (ref 98–107)
CO2 SERPL-SCNC: 21 MMOL/L (ref 22–29)
CREAT SERPL-MCNC: 1.9 MG/DL (ref 0.5–1)
EOSINOPHIL # BLD: 0.31 K/UL (ref 0.05–0.5)
EOSINOPHILS RELATIVE PERCENT: 5 % (ref 0–6)
ERYTHROCYTE [DISTWIDTH] IN BLOOD BY AUTOMATED COUNT: 14.1 % (ref 11.5–15)
GFR SERPL CREATININE-BSD FRML MDRD: 28 ML/MIN/1.73M2
GLUCOSE SERPL-MCNC: 98 MG/DL (ref 74–99)
HCT VFR BLD AUTO: 43.8 % (ref 34–48)
HGB BLD-MCNC: 14.6 G/DL (ref 11.5–15.5)
IMM GRANULOCYTES # BLD AUTO: <0.03 K/UL (ref 0–0.58)
IMM GRANULOCYTES NFR BLD: 0 % (ref 0–5)
LYMPHOCYTES NFR BLD: 1.82 K/UL (ref 1.5–4)
LYMPHOCYTES RELATIVE PERCENT: 29 % (ref 20–42)
MCH RBC QN AUTO: 30.4 PG (ref 26–35)
MCHC RBC AUTO-ENTMCNC: 33.3 G/DL (ref 32–34.5)
MCV RBC AUTO: 91.3 FL (ref 80–99.9)
MONOCYTES NFR BLD: 0.58 K/UL (ref 0.1–0.95)
MONOCYTES NFR BLD: 9 % (ref 2–12)
NEUTROPHILS NFR BLD: 55 % (ref 43–80)
NEUTS SEG NFR BLD: 3.46 K/UL (ref 1.8–7.3)
PARTIAL THROMBOPLASTIN TIME: 131.4 SEC (ref 24.5–35.1)
PARTIAL THROMBOPLASTIN TIME: 68.8 SEC (ref 24.5–35.1)
PARTIAL THROMBOPLASTIN TIME: 75.3 SEC (ref 24.5–35.1)
PLATELET # BLD AUTO: 157 K/UL (ref 130–450)
PMV BLD AUTO: 10.9 FL (ref 7–12)
POTASSIUM SERPL-SCNC: 4.5 MMOL/L (ref 3.5–5)
RBC # BLD AUTO: 4.8 M/UL (ref 3.5–5.5)
SODIUM SERPL-SCNC: 136 MMOL/L (ref 132–146)
TROPONIN I SERPL HS-MCNC: 30 NG/L (ref 0–9)
WBC OTHER # BLD: 6.3 K/UL (ref 4.5–11.5)

## 2024-03-31 PROCEDURE — 36415 COLL VENOUS BLD VENIPUNCTURE: CPT

## 2024-03-31 PROCEDURE — 2580000003 HC RX 258: Performed by: INTERNAL MEDICINE

## 2024-03-31 PROCEDURE — 2140000000 HC CCU INTERMEDIATE R&B

## 2024-03-31 PROCEDURE — 6370000000 HC RX 637 (ALT 250 FOR IP): Performed by: INTERNAL MEDICINE

## 2024-03-31 PROCEDURE — 84484 ASSAY OF TROPONIN QUANT: CPT

## 2024-03-31 PROCEDURE — 80048 BASIC METABOLIC PNL TOTAL CA: CPT

## 2024-03-31 PROCEDURE — 85025 COMPLETE CBC W/AUTO DIFF WBC: CPT

## 2024-03-31 PROCEDURE — 99223 1ST HOSP IP/OBS HIGH 75: CPT | Performed by: INTERNAL MEDICINE

## 2024-03-31 PROCEDURE — 99232 SBSQ HOSP IP/OBS MODERATE 35: CPT | Performed by: INTERNAL MEDICINE

## 2024-03-31 PROCEDURE — 85730 THROMBOPLASTIN TIME PARTIAL: CPT

## 2024-03-31 PROCEDURE — 83880 ASSAY OF NATRIURETIC PEPTIDE: CPT

## 2024-03-31 RX ADMIN — FUROSEMIDE 40 MG: 40 TABLET ORAL at 20:52

## 2024-03-31 RX ADMIN — SODIUM CHLORIDE, PRESERVATIVE FREE 10 ML: 5 INJECTION INTRAVENOUS at 09:18

## 2024-03-31 RX ADMIN — SODIUM CHLORIDE, PRESERVATIVE FREE 10 ML: 5 INJECTION INTRAVENOUS at 20:52

## 2024-03-31 RX ADMIN — METOPROLOL TARTRATE 25 MG: 25 TABLET, FILM COATED ORAL at 09:18

## 2024-03-31 RX ADMIN — LISINOPRIL 5 MG: 5 TABLET ORAL at 15:52

## 2024-03-31 RX ADMIN — APIXABAN 5 MG: 5 TABLET, FILM COATED ORAL at 20:52

## 2024-03-31 ASSESSMENT — PAIN SCALES - GENERAL: PAINLEVEL_OUTOF10: 0

## 2024-03-31 NOTE — PLAN OF CARE
Problem: Discharge Planning  Goal: Discharge to home or other facility with appropriate resources  3/30/2024 2305 by Any Ojeda, RN  Outcome: Progressing  Flowsheets (Taken 3/30/2024 2045)  Discharge to home or other facility with appropriate resources:   Identify barriers to discharge with patient and caregiver   Arrange for needed discharge resources and transportation as appropriate   Identify discharge learning needs (meds, wound care, etc)     Problem: ABCDS Injury Assessment  Goal: Absence of physical injury  3/30/2024 2305 by Any Ojeda, RN  Outcome: Progressing     Problem: ABCDS Injury Assessment  Goal: Absence of physical injury  3/30/2024 2305 by Any Ojeda, RN  Outcome: Progressing     Problem: Pain  Goal: Verbalizes/displays adequate comfort level or baseline comfort level  Outcome: Progressing     Problem: Discharge Planning  Goal: Discharge to home or other facility with appropriate resources  3/30/2024 2305 by Any Ojeda, RN  Outcome: Progressing  Flowsheets (Taken 3/30/2024 2045)  Discharge to home or other facility with appropriate resources:   Identify barriers to discharge with patient and caregiver   Arrange for needed discharge resources and transportation as appropriate   Identify discharge learning needs (meds, wound care, etc)

## 2024-03-31 NOTE — CONSULTS
evidence of obvious neurological deficits.    PSYCHOLOGICAL: Patient is in normal mood.        Labs:  CBC:   Lab Results   Component Value Date/Time    WBC 6.3 03/31/2024 04:07 AM    RBC 4.80 03/31/2024 04:07 AM    HGB 14.6 03/31/2024 04:07 AM    HCT 43.8 03/31/2024 04:07 AM    MCV 91.3 03/31/2024 04:07 AM    MCH 30.4 03/31/2024 04:07 AM    MCHC 33.3 03/31/2024 04:07 AM    RDW 14.1 03/31/2024 04:07 AM     03/31/2024 04:07 AM    MPV 10.9 03/31/2024 04:07 AM     BMP: @BRIEFLAB(N  A,K,CL,CO2,BUN,LABALBU,CREATININE,CALCIUM,GFRAA,LABGLOM,AGLUCOSE)@  Magnesium:    Lab Results   Component Value Date/Time    MG 1.9 03/30/2024 12:33 PM     Cardiac Enzymes:   Lab Results   Component Value Date    CKTOTAL 40 12/22/2023    CKTOTAL 122 07/29/2022    CKTOTAL 204 (H) 07/28/2022    TROPONINI <0.01 10/05/2020      PT/INR:    Lab Results   Component Value Date/Time    PROTIME 11.3 12/22/2023 09:45 PM    INR 1.0 12/22/2023 09:45 PM     TSH:    Lab Results   Component Value Date/Time    TSH 4.23 03/30/2024 12:33 PM       Pertinent cardiac studies:    -ECG 3/30/2024: Possible atrial flutter with baseline RBBB    -TTE 5/15/2023 ([facility]):   CONCLUSIONS:   - Technically difficult exam due to body habitus.   - Exam indication: Routine surveillance of prosthetic valve (>3yrs)   - The left ventricle is normal in size. There is left ventricular hypertrophy.   Left ventricular systolic function is normal. EF = 65 ± 5% (visual est.)   - The right ventricle is normal in size. Right ventricular systolic function is   normal.   - The left atrial cavity is mildly dilated.   - The visualized aorta is borderline dilated with a maximal dimension of 3.9 cm.   - Post mitral valve repair. Future Ring (size #32). There is trace mitral valve   regurgitation. The peak gradient is 6 mmHg and the mean gradient is 2 mmHg.   Today's MV gradients obtained at HR 63bpm. Prior pk/mn gradients: 15/6mmHg.   - Trifecta prosthetic aortic valve (size #23). There is

## 2024-04-01 LAB
ANION GAP SERPL CALCULATED.3IONS-SCNC: 10 MMOL/L (ref 7–16)
BUN SERPL-MCNC: 55 MG/DL (ref 6–23)
CALCIUM SERPL-MCNC: 9.3 MG/DL (ref 8.6–10.2)
CHLORIDE SERPL-SCNC: 101 MMOL/L (ref 98–107)
CO2 SERPL-SCNC: 26 MMOL/L (ref 22–29)
CREAT SERPL-MCNC: 2.2 MG/DL (ref 0.5–1)
GFR SERPL CREATININE-BSD FRML MDRD: 23 ML/MIN/1.73M2
GLUCOSE SERPL-MCNC: 112 MG/DL (ref 74–99)
POTASSIUM SERPL-SCNC: 4.9 MMOL/L (ref 3.5–5)
SODIUM SERPL-SCNC: 137 MMOL/L (ref 132–146)

## 2024-04-01 PROCEDURE — 6370000000 HC RX 637 (ALT 250 FOR IP): Performed by: INTERNAL MEDICINE

## 2024-04-01 PROCEDURE — 36415 COLL VENOUS BLD VENIPUNCTURE: CPT

## 2024-04-01 PROCEDURE — 2580000003 HC RX 258: Performed by: INTERNAL MEDICINE

## 2024-04-01 PROCEDURE — 99233 SBSQ HOSP IP/OBS HIGH 50: CPT | Performed by: INTERNAL MEDICINE

## 2024-04-01 PROCEDURE — 80048 BASIC METABOLIC PNL TOTAL CA: CPT

## 2024-04-01 PROCEDURE — 99232 SBSQ HOSP IP/OBS MODERATE 35: CPT | Performed by: STUDENT IN AN ORGANIZED HEALTH CARE EDUCATION/TRAINING PROGRAM

## 2024-04-01 PROCEDURE — 6370000000 HC RX 637 (ALT 250 FOR IP): Performed by: NURSE PRACTITIONER

## 2024-04-01 PROCEDURE — 2140000000 HC CCU INTERMEDIATE R&B

## 2024-04-01 RX ORDER — SODIUM CHLORIDE 9 MG/ML
INJECTION, SOLUTION INTRAVENOUS CONTINUOUS
Status: DISCONTINUED | OUTPATIENT
Start: 2024-04-01 | End: 2024-04-02 | Stop reason: HOSPADM

## 2024-04-01 RX ORDER — METOPROLOL SUCCINATE 25 MG/1
25 TABLET, EXTENDED RELEASE ORAL DAILY
Status: DISCONTINUED | OUTPATIENT
Start: 2024-04-02 | End: 2024-04-02 | Stop reason: HOSPADM

## 2024-04-01 RX ADMIN — SODIUM CHLORIDE, PRESERVATIVE FREE 10 ML: 5 INJECTION INTRAVENOUS at 08:28

## 2024-04-01 RX ADMIN — APIXABAN 5 MG: 5 TABLET, FILM COATED ORAL at 21:04

## 2024-04-01 RX ADMIN — SODIUM CHLORIDE: 9 INJECTION, SOLUTION INTRAVENOUS at 11:46

## 2024-04-01 RX ADMIN — SODIUM CHLORIDE, PRESERVATIVE FREE 10 ML: 5 INJECTION INTRAVENOUS at 21:05

## 2024-04-01 RX ADMIN — APIXABAN 5 MG: 5 TABLET, FILM COATED ORAL at 08:28

## 2024-04-01 RX ADMIN — POLYVINYL ALCOHOL, POVIDONE 1 DROP: 14; 6 SOLUTION/ DROPS OPHTHALMIC at 21:05

## 2024-04-01 RX ADMIN — POLYVINYL ALCOHOL, POVIDONE 1 DROP: 14; 6 SOLUTION/ DROPS OPHTHALMIC at 13:19

## 2024-04-01 ASSESSMENT — PAIN SCALES - GENERAL
PAINLEVEL_OUTOF10: 0
PAINLEVEL_OUTOF10: 0

## 2024-04-01 NOTE — ACP (ADVANCE CARE PLANNING)
Advance Care Planning   Healthcare Decision Maker:    Primary Decision Maker: Josse Conn - [facility] - [phone]    Click here to complete Healthcare Decision Makers including selection of the Healthcare Decision Maker Relationship (ie \"Primary\").     Electronically signed by Frankie Alas RN CM on 4/1/2024 at 4:17 PM

## 2024-04-01 NOTE — CARE COORDINATION
04/01/24 Transition of care:  Pt admitted from ED for palpitations Cardiology consulted heparin d/c pt on po eliquis Pt to have Ziopatch at discharge BUN is 55 creatinine 2.2 Met with pt to discuss transition of care and discharge preferences  was included in discharge planning Pt lives with her  in one story home with 3 stairs and handrail to enter Pt is independent with all ADLs Pt uses no AD for ambulation Pt has HHS in past with [facility] No LUZ MARIA history PCP is [facility] RX is [facility] on [address] Plan is to return home with  with no needs expressed currently for discharge  will transport home CM/SW to follow Electronically signed by Frankie Alas RN CM on 4/1/2024 at 4:23 PM      Case Management Assessment  Initial Evaluation    Date/Time of Evaluation: 4/1/2024 4:24 PM  Assessment Completed by: Frankie Alas RN    If patient is discharged prior to next notation, then this note serves as note for discharge by case management.    Patient Name: Lula Conn                   YOB: 1950  Diagnosis: Paroxysmal atrial flutter (HCC) [I48.92]  A-fib (HCC) [I48.91]  Atrial fibrillation with rapid ventricular response (HCC) [I48.91]                   Date / Time: 3/30/2024 12:13 PM    Patient Admission Status: Inpatient   Readmission Risk (Low < 19, Mod (19-27), High > 27): Readmission Risk Score: 9.9    Current PCP: Maximilian Coronel MD  PCP verified by CM? Yes    Chart Reviewed: Yes      History Provided by: Patient  Patient Orientation: Alert and Oriented    Patient Cognition: Alert    Hospitalization in the last 30 days (Readmission):  No    If yes, Readmission Assessment in  Navigator will be completed.    Advance Directives:      Code Status: Full Code   Patient's Primary Decision Maker is: Legal Next of Kin    Primary Decision Maker: Josse Conn - Spouse - [phone]    Discharge Planning:    Patient lives with: Spouse/Significant Other Type of Home:

## 2024-04-02 VITALS
HEIGHT: 64 IN | HEART RATE: 60 BPM | DIASTOLIC BLOOD PRESSURE: 68 MMHG | RESPIRATION RATE: 16 BRPM | OXYGEN SATURATION: 91 % | WEIGHT: 202 LBS | TEMPERATURE: 97.5 F | SYSTOLIC BLOOD PRESSURE: 134 MMHG | BODY MASS INDEX: 34.49 KG/M2

## 2024-04-02 PROBLEM — N18.9 ACUTE KIDNEY INJURY SUPERIMPOSED ON CHRONIC KIDNEY DISEASE (HCC): Status: ACTIVE | Noted: 2024-04-02

## 2024-04-02 PROBLEM — N17.9 ACUTE KIDNEY INJURY SUPERIMPOSED ON CHRONIC KIDNEY DISEASE (HCC): Status: ACTIVE | Noted: 2024-04-02

## 2024-04-02 LAB
ANION GAP SERPL CALCULATED.3IONS-SCNC: 11 MMOL/L (ref 7–16)
BUN SERPL-MCNC: 48 MG/DL (ref 6–23)
CALCIUM SERPL-MCNC: 8.8 MG/DL (ref 8.6–10.2)
CHLORIDE SERPL-SCNC: 105 MMOL/L (ref 98–107)
CO2 SERPL-SCNC: 17 MMOL/L (ref 22–29)
CREAT SERPL-MCNC: 1.7 MG/DL (ref 0.5–1)
EKG ATRIAL RATE: 153 BPM
EKG Q-T INTERVAL: 356 MS
EKG QRS DURATION: 168 MS
EKG QTC CALCULATION (BAZETT): 558 MS
EKG R AXIS: -138 DEGREES
EKG T AXIS: 17 DEGREES
EKG VENTRICULAR RATE: 148 BPM
GFR SERPL CREATININE-BSD FRML MDRD: 31 ML/MIN/1.73M2
GLUCOSE BLD-MCNC: 172 MG/DL (ref 74–99)
GLUCOSE BLD-MCNC: 266 MG/DL (ref 74–99)
GLUCOSE BLD-MCNC: 77 MG/DL (ref 74–99)
GLUCOSE BLD-MCNC: 95 MG/DL (ref 74–99)
GLUCOSE SERPL-MCNC: 101 MG/DL (ref 74–99)
POTASSIUM SERPL-SCNC: 4.6 MMOL/L (ref 3.5–5)
POTASSIUM SERPL-SCNC: 5.6 MMOL/L (ref 3.5–5)
SODIUM SERPL-SCNC: 133 MMOL/L (ref 132–146)

## 2024-04-02 PROCEDURE — 99239 HOSP IP/OBS DSCHRG MGMT >30: CPT | Performed by: STUDENT IN AN ORGANIZED HEALTH CARE EDUCATION/TRAINING PROGRAM

## 2024-04-02 PROCEDURE — 36415 COLL VENOUS BLD VENIPUNCTURE: CPT

## 2024-04-02 PROCEDURE — 80048 BASIC METABOLIC PNL TOTAL CA: CPT

## 2024-04-02 PROCEDURE — 2580000003 HC RX 258: Performed by: STUDENT IN AN ORGANIZED HEALTH CARE EDUCATION/TRAINING PROGRAM

## 2024-04-02 PROCEDURE — 6370000000 HC RX 637 (ALT 250 FOR IP): Performed by: NURSE PRACTITIONER

## 2024-04-02 PROCEDURE — 6370000000 HC RX 637 (ALT 250 FOR IP): Performed by: INTERNAL MEDICINE

## 2024-04-02 PROCEDURE — 2580000003 HC RX 258: Performed by: INTERNAL MEDICINE

## 2024-04-02 PROCEDURE — 99232 SBSQ HOSP IP/OBS MODERATE 35: CPT | Performed by: INTERNAL MEDICINE

## 2024-04-02 PROCEDURE — 82962 GLUCOSE BLOOD TEST: CPT

## 2024-04-02 PROCEDURE — 6370000000 HC RX 637 (ALT 250 FOR IP): Performed by: STUDENT IN AN ORGANIZED HEALTH CARE EDUCATION/TRAINING PROGRAM

## 2024-04-02 PROCEDURE — 84132 ASSAY OF SERUM POTASSIUM: CPT

## 2024-04-02 PROCEDURE — 6360000002 HC RX W HCPCS: Performed by: STUDENT IN AN ORGANIZED HEALTH CARE EDUCATION/TRAINING PROGRAM

## 2024-04-02 RX ORDER — GLUCAGON 1 MG/ML
1 KIT INJECTION PRN
Status: DISCONTINUED | OUTPATIENT
Start: 2024-04-02 | End: 2024-04-02 | Stop reason: HOSPADM

## 2024-04-02 RX ORDER — DEXTROSE MONOHYDRATE 100 MG/ML
INJECTION, SOLUTION INTRAVENOUS CONTINUOUS PRN
Status: DISCONTINUED | OUTPATIENT
Start: 2024-04-02 | End: 2024-04-02 | Stop reason: HOSPADM

## 2024-04-02 RX ORDER — METOPROLOL SUCCINATE 25 MG/1
25 TABLET, EXTENDED RELEASE ORAL DAILY
Qty: 30 TABLET | Refills: 3 | Status: SHIPPED | OUTPATIENT
Start: 2024-04-02

## 2024-04-02 RX ORDER — ATORVASTATIN CALCIUM 10 MG/1
10 TABLET, FILM COATED ORAL DAILY
Qty: 30 TABLET | Refills: 0 | Status: SHIPPED | OUTPATIENT
Start: 2024-04-02

## 2024-04-02 RX ORDER — CALCIUM GLUCONATE 10 MG/ML
1000 INJECTION, SOLUTION INTRAVENOUS ONCE
Status: COMPLETED | OUTPATIENT
Start: 2024-04-02 | End: 2024-04-02

## 2024-04-02 RX ADMIN — CALCIUM GLUCONATE 1000 MG: 10 INJECTION, SOLUTION INTRAVENOUS at 09:14

## 2024-04-02 RX ADMIN — SODIUM ZIRCONIUM CYCLOSILICATE 10 G: 10 POWDER, FOR SUSPENSION ORAL at 09:23

## 2024-04-02 RX ADMIN — SODIUM CHLORIDE, PRESERVATIVE FREE 10 ML: 5 INJECTION INTRAVENOUS at 09:16

## 2024-04-02 RX ADMIN — INSULIN HUMAN 10 UNITS: 100 INJECTION, SOLUTION PARENTERAL at 08:52

## 2024-04-02 RX ADMIN — APIXABAN 5 MG: 5 TABLET, FILM COATED ORAL at 08:46

## 2024-04-02 RX ADMIN — POLYVINYL ALCOHOL, POVIDONE 1 DROP: 14; 6 SOLUTION/ DROPS OPHTHALMIC at 08:46

## 2024-04-02 RX ADMIN — METOPROLOL SUCCINATE 25 MG: 25 TABLET, EXTENDED RELEASE ORAL at 08:46

## 2024-04-02 RX ADMIN — DEXTROSE MONOHYDRATE 250 ML: 100 INJECTION, SOLUTION INTRAVENOUS at 08:56

## 2024-04-02 ASSESSMENT — PAIN SCALES - GENERAL: PAINLEVEL_OUTOF10: 0

## 2024-04-02 NOTE — DISCHARGE INSTRUCTIONS
Your home medications Lasix 40 Mg p.o. nightly was held due to REAGAN on CKD.  He also had hyperkalemia and REAGAN due to which lisinopril is on hold. Script for repeat BMP as outpatient will be provided to you on discharge, please follow-up with your PCP within 1 week of discharge to repeat your labs to check renal function, electrolytes, and discuss about resuming the medications if the renal function and potassium level are stable.  Follow-up with your cardiologist in

## 2024-04-02 NOTE — PLAN OF CARE
Problem: Discharge Planning  Goal: Discharge to home or other facility with appropriate resources  Outcome: Progressing     Problem: ABCDS Injury Assessment  Goal: Absence of physical injury  Outcome: Progressing     Problem: Pain  Goal: Verbalizes/displays adequate comfort level or baseline comfort level  Outcome: Progressing     Problem: Chronic Conditions and Co-morbidities  Goal: Patient's chronic conditions and co-morbidity symptoms are monitored and maintained or improved  Outcome: Progressing

## 2024-04-02 NOTE — PROGRESS NOTES
Hospitalist Progress Note      Synopsis: Patient admitted on 3/30/2024  73-year-old male with past medical history of coronary artery disease status post bypass surgery, aortic valve replacement and mitral valve repair came from [facility] due to new onset atrial fibrillation.  History taken from the patient at the bedside, she was preparing for discharge Sunday cooking in the kitchen suddenly she felt palpitations and it is not going away then she rushed to the [place] [facility].  She was found to be atrial fibrillation with RVR, [place] [facility] contacted with cardiologist and recommended to start heparin drip.  During my conversation patient was doing okay, no shortness of breath, no chest pain.  Her initial blood pressure was very highBut eventually controlled.  Her blood chemistry shows no 1.6 around her baseline, other lab  insignificant.     Echocardiogram [facility] 5/2023 -The left ventricle is normal in size. There is left ventricular hypertrophy.   Left ventricular systolic function is normal. EF = 65 ± 5% (visual est.)   - The right ventricle is normal in size. Right ventricular systolic function is   normal.   - The left atrial cavity is mildly dilated.   - The visualized aorta is borderline dilated with a maximal dimension of 3.9 cm.   - Post mitral valve repair. Future Ring (size #32). There is trace mitral valve   regurgitation. The peak gradient is 6 mmHg and the mean gradient is 2 mmHg.   Today's MV gradients obtained at HR 63bpm. Prior pk/mn gradients: 15/6mmHg.   - Trifecta prosthetic aortic valve (size #23). There is no aortic valve   regurgitation. The peak gradient is 39 mmHg, the mean gradient is 20 mmHg and the   dimensionless valve index is 0.39. Prior AV gradients: 32/16mmHg. Mild restriction    of the LCC equivalent.     ASSESSMENT:    Principal Problem:    A-fib ([facility])  Active Problems:    Atrial fibrillation with rapid ventricular response ([facility])  Resolved Problems:    * No resolved hospital 
    INPATIENT CARDIOLOGY FOLLOW-UP    Name: Lula Conn    Age: 73 y.o.    Date of Admission: 3/30/2024 12:13 PM    Date of Service: 4/2/2024    Chief Complaint: Follow-up for atrial fibrillation with RVR    Interim History:  No new overnight cardiac complaints.  Today, she is feeling tired and had a 1 episode of left-sided chest pain last night and that lasted only few seconds.  No further episodes of chest pain.  Her potassium was elevated and given 1 dose of Lokelma.  Currently with no complaints of CP, SOB, palpitations, dizziness, or lightheadedness. SR on telemetry.    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     Review of Systems:   Cardiac: As per HPI  General: No fever, chills  Pulmonary: As per HPI  HEENT: No visual disturbances, difficult swallowing  GI: No nausea, vomiting  Endocrine: No thyroid disease or DM  Musculoskeletal: TRAN x 4, no focal motor deficits  Skin: Intact, no rashes  Neuro/Psych: No headache or seizures    Problem List:  Patient Active Problem List   Diagnosis    S/P AVR    S/P MVR (mitral valve repair)    Hypervolemia    Essential hypertension    Stage 3b chronic kidney disease (HCC)    Diabetes mellitus type 2 in obese    Encounter to establish care    Severe obesity (BMI 35.0-39.9) with comorbidity (HCC)    Pneumonia due to infectious organism    Diverticulitis of colon    Abdominal pain    A-fib (HCC)    Atrial fibrillation with rapid ventricular response (HCC)    Paroxysmal atrial flutter (HCC) 
4 Eyes Skin Assessment     NAME:  Lula Conn  YOB: 1950  MEDICAL RECORD NUMBER:  [id number]    The patient is being assessed for  Admission    I agree that at least one RN has performed a thorough Head to Toe Skin Assessment on the patient. ALL assessment sites listed below have been assessed.      Areas assessed by both nurses:    Head, Face, Ears, Shoulders, Back, Chest, Arms, Elbows, Hands, Sacrum. Buttock, Coccyx, Ischium, Legs. Feet and Heels, and Under Medical Devices         Does the Patient have a Wound? No noted wound(s)       Maximo Prevention initiated by RN: No  Wound Care Orders initiated by RN: No    Pressure Injury (Stage 3,4, Unstageable, DTI, NWPT, and Complex wounds) if present, place Wound referral order by RN under : No    New Ostomies, if present place, Ostomy referral order under : No     Nurse 1 eSignature: Electronically signed by Jyoti Herrera RN on 3/30/24 at 3:38 PM EDT    **SHARE this note so that the co-signing nurse can place an eSignature**    Nurse 2 eSignature: Electronically signed by Francisco Javier Noble RN on 3/30/24 at 6:20 PM EDT  
Discharge instructions provided to pt and spouse. Discussed following up with Cardiologist as well as utilizing lab work orders to obtain blood work to continue monitoring kidney function. Discussed new medications as well as stopping lasix and lisinopril until kidney function improves and speak with Dr.   Pt had no further questions at this time.   
RN messaged Dr. Diallo about patient's ptt. Order to hold heparin for 60 minutes and restart at 15 u/kg/hr.  
RN rickeyd Renea Thibodeaux for patient requesting eye drops for dry eyes.  
RN sent cardiology consult to Dr. Jimenez stat.  
bilateral lower extremity edema. Brisk capillary refill.   Skin:  No rashes  on visible skin  Neurologic: awake, alert and oriented x 3. Following commands. No focal neurological deficit. Cranial nerves 2-12 grossly normal      ASSESSMENT and PLAN:    A-fib/flutter:  [id number] score 6, started on heparin drip transition to Eliquis 5 Mg p.o. twice daily  She was on metoprolol 25 Mg p.o. twice daily continue the same, will order Zio patch on discharge, cardiology following  Prior history of A-fib during her complicated hospital course for open heart surgery dx 8/2016, s/p multiple DCCV     VHD:  Bicuspid AV s/p s/p homograft in 1999, redo surgery 2016 with Bio Bentall (#23 Trifecta AV and #26 mm Gelweave valsalva graft, MV repair with #32 ring  Bovine valve, follows up with [facility] cardiology    REAGAN on CKD:  Creatinine bumped to 2.2 this morning, baseline is 1.6  Hold lisinopril, Lasix, will start NS at 75 cc/h, monitor urine output, monitor BMP in a.m. and  Avoid nephrotoxic agents, if creatinine continues to trend might need nephrology evaluation,  DVT prophylaxis Eliquis    Hypertension:  Hold lisinopril due to REAGAN, continue metoprolol, if BP is elevated will add amlodipine, monitor vitals    History of CAD:  Status post SVG to RCA 2016, continue statin, continue baby aspirin    DISPOSITION: Possible discharge in 24 to 48 hours depending on her renal function    Medications:  REVIEWED DAILY    Infusion Medications    sodium chloride 75 mL/hr at 04/01/24 1146    sodium chloride       Scheduled Medications    [START ON 4/2/2024] metoprolol succinate  25 mg Oral Daily    apixaban  5 mg Oral BID    [Held by provider] furosemide  40 mg Oral Nightly    sodium chloride flush  5-40 mL IntraVENous 2 times per day     PRN Meds: albuterol, sodium chloride flush, sodium chloride, potassium chloride **OR** potassium alternative oral replacement **OR** potassium chloride, magnesium sulfate, polyethylene glycol, 
bruits  Lungs: Clear to auscultation bilaterally. No wheezes, rales, or rhonchi.  Cardiac: Regular rate and rhythm, +S1S2, no murmurs apparent  Abdomen: Soft, nontender, +bowel sounds  Extremities: Moves all extremities x 4, no lower extremity edema  Neurologic: No focal motor deficits apparent, normal mood and affect  Peripheral Pulses: Intact posterior tibial pulses bilaterally    Intake/Output:    Intake/Output Summary (Last 24 hours) at 4/1/2024 1802  Last data filed at 4/1/2024 1600  Gross per 24 hour   Intake 360 ml   Output 2500 ml   Net -2140 ml     I/O this shift:  In: 360 [P.O.:360]  Out: -     Laboratory Tests:  Recent Labs     03/30/24  1233 03/31/24  0407 04/01/24  0810    136 137   K 4.4 4.5 4.9    100 101   CO2 21* 21* 26   BUN 43* 49* 55*   CREATININE 1.6* 1.9* 2.2*   GLUCOSE 83 98 112*   CALCIUM 9.8 9.2 9.3     Lab Results   Component Value Date/Time    MG 1.9 03/30/2024 12:33 PM     Recent Labs     03/30/24  1233   ALKPHOS 73   ALT 15   AST 16   PROT 8.0   BILITOT 0.2   LABALBU 4.4     Recent Labs     03/30/24  1321 03/30/24  1626 03/31/24  0407   WBC 7.5 9.2 6.3   RBC 4.89 4.86 4.80   HGB 14.4 14.6 14.6   HCT 43.1 43.2 43.8   MCV 88.1 88.9 91.3   MCH 29.4 30.0 30.4   MCHC 33.4 33.8 33.3   RDW 13.9 13.9 14.1    180 157   MPV 10.3 10.6 10.9     Lab Results   Component Value Date    CKTOTAL 40 12/22/2023    TROPONINI <0.01 10/05/2020     Lab Results   Component Value Date    INR 1.0 12/22/2023    INR 2.9 12/31/2016    INR 2.6 11/30/2016    PROTIME 11.3 12/22/2023    PROTIME 33.8 (H) 12/31/2016    PROTIME 30.0 (H) 11/30/2016     Lab Results   Component Value Date    TSH 4.23 (H) 03/30/2024     Lab Results   Component Value Date    LABA1C 6.7 09/08/2023     No results found for: \"EAG\"  Lab Results   Component Value Date    CHOL 152 07/27/2022    CHOL 228 (H) 01/22/2019    CHOL 209 (H) 02/08/2018     Lab Results   Component Value Date    TRIG 160 (H) 07/27/2022    TRIG 383 (H)

## 2024-04-02 NOTE — DISCHARGE SUMMARY
Hospitalist Discharge Summary    Patient ID: Lula Conn   Patient : 1950  Patient's PCP: Maximilian Coronel MD    Admit Date: 3/30/2024   Admitting Physician: Yoel Garza MD    Discharge Date:  2024   Discharge Physician: Andie Tejada MD   Discharge Condition: Stable  Discharge Disposition: Home      Hospital course in brief:  (Please refer to daily progress notes for a comprehensive review of the hospitalization by requesting medical records)    Lula is 73-year-old male with past medical history of coronary artery disease status post bypass surgery, aortic valve replacement and mitral valve repair presented to [place] ER due to complaints of palpitations she was found to have new onset atrial fibrillation with RVR, transferred to [facility] for further cardiac evaluation and management.  Cardiology consulted, she has Ishan vas score 6 started on heparin drip, transition to Eliquis 5 Mg p.o. twice daily, recommended continuing metoprolol 25 Mg p.o. twice daily,  : Creatinine this morning was 2.1, baseline is 1.6,  lisinopril, Lasix were held, started on IV NS at 75 cc/h  On : Creatinine improved to 1.7 which is around her baseline, will continue to hold lisinopril and Lasix on discharge, due to REAGAN and hyperkalemia.  She will be sent home on Zio patch to check the A-fib burden.   Metoprolol 25 Mg p.o. twice daily was changed to metoprolol 25 Mg p.o. daily due to bradycardia, Eliquis 5 Mg p.o. twice daily for anticoagulation, [id number] score of 6  Lipitor 10 Mg p.o. nightly was added to her medication regimen, advised and provided a script to get another BMP on Friday morning.  She was advised to follow-up with her cardiology in couple of weeks.  She was stable from medical and cardiology standpoint to be discharged home.    Physical Exam:    General appearance: No apparent distress   HEENT:  Normocephalic. Conjunctivae/corneas clear. Moist mucosa   Neck: Supple. No

## 2024-04-02 NOTE — PLAN OF CARE
Problem: Discharge Planning  Goal: Discharge to home or other facility with appropriate resources  Outcome: Completed     Problem: ABCDS Injury Assessment  Goal: Absence of physical injury  Outcome: Completed     Problem: Pain  Goal: Verbalizes/displays adequate comfort level or baseline comfort level  Outcome: Completed     Problem: Chronic Conditions and Co-morbidities  Goal: Patient's chronic conditions and co-morbidity symptoms are monitored and maintained or improved  Outcome: Completed

## 2024-04-03 ENCOUNTER — COMMUNITY CARE MANAGEMENT (OUTPATIENT)
Facility: CLINIC | Age: 74
End: 2024-04-03

## 2024-04-05 ENCOUNTER — HOSPITAL ENCOUNTER (OUTPATIENT)
Age: 74
Discharge: HOME | End: 2024-04-05
Payer: MEDICARE

## 2024-04-05 DIAGNOSIS — N17.9 AKI (ACUTE KIDNEY INJURY) (HCC): ICD-10-CM

## 2024-04-05 LAB
ANION GAP SERPL CALCULATED.3IONS-SCNC: 11 MMOL/L (ref 7–16)
BUN SERPL-MCNC: 35 MG/DL (ref 6–23)
CALCIUM SERPL-MCNC: 9.9 MG/DL (ref 8.6–10.2)
CHLORIDE SERPL-SCNC: 102 MMOL/L (ref 98–107)
CO2 SERPL-SCNC: 26 MMOL/L (ref 22–29)
CREAT SERPL-MCNC: 1.6 MG/DL (ref 0.5–1)
EKG ATRIAL RATE: 48 BPM
EKG ATRIAL RATE: 79 BPM
EKG ATRIAL RATE: 83 BPM
EKG P-R INTERVAL: 114 MS
EKG P-R INTERVAL: 122 MS
EKG Q-T INTERVAL: 362 MS
EKG Q-T INTERVAL: 412 MS
EKG Q-T INTERVAL: 424 MS
EKG QRS DURATION: 162 MS
EKG QRS DURATION: 172 MS
EKG QRS DURATION: 174 MS
EKG QTC CALCULATION (BAZETT): 472 MS
EKG QTC CALCULATION (BAZETT): 498 MS
EKG QTC CALCULATION (BAZETT): 571 MS
EKG R AXIS: -104 DEGREES
EKG R AXIS: -144 DEGREES
EKG R AXIS: -63 DEGREES
EKG T AXIS: 17 DEGREES
EKG T AXIS: 35 DEGREES
EKG T AXIS: 37 DEGREES
EKG VENTRICULAR RATE: 150 BPM
EKG VENTRICULAR RATE: 79 BPM
EKG VENTRICULAR RATE: 83 BPM
GFR SERPL CREATININE-BSD FRML MDRD: 34 ML/MIN/1.73M2
GLUCOSE SERPL-MCNC: 84 MG/DL (ref 74–99)
POTASSIUM SERPL-SCNC: 5.4 MMOL/L (ref 3.5–5)
SODIUM SERPL-SCNC: 139 MMOL/L (ref 132–146)

## 2024-04-05 PROCEDURE — 36415 COLL VENOUS BLD VENIPUNCTURE: CPT

## 2024-04-05 PROCEDURE — 80048 BASIC METABOLIC PNL TOTAL CA: CPT

## 2024-04-16 ENCOUNTER — OFFICE VISIT (OUTPATIENT)
Dept: FAMILY MEDICINE CLINIC | Age: 74
End: 2024-04-16

## 2024-04-16 VITALS
BODY MASS INDEX: 35.04 KG/M2 | HEART RATE: 53 BPM | TEMPERATURE: 98 F | DIASTOLIC BLOOD PRESSURE: 72 MMHG | SYSTOLIC BLOOD PRESSURE: 136 MMHG | HEIGHT: 64 IN | WEIGHT: 205.25 LBS | OXYGEN SATURATION: 97 %

## 2024-04-16 DIAGNOSIS — I48.91 ATRIAL FIBRILLATION, UNSPECIFIED TYPE (HCC): ICD-10-CM

## 2024-04-16 DIAGNOSIS — E66.01 SEVERE OBESITY (BMI 35.0-39.9) WITH COMORBIDITY (HCC): ICD-10-CM

## 2024-04-16 DIAGNOSIS — Z09 HOSPITAL DISCHARGE FOLLOW-UP: Primary | ICD-10-CM

## 2024-04-16 DIAGNOSIS — I10 PRIMARY HYPERTENSION: ICD-10-CM

## 2024-04-16 DIAGNOSIS — R00.1 BRADYCARDIA: ICD-10-CM

## 2024-04-16 DIAGNOSIS — E87.5 HYPERKALEMIA: ICD-10-CM

## 2024-04-16 RX ORDER — FUROSEMIDE 20 MG/1
20 TABLET ORAL DAILY
COMMUNITY
Start: 2024-04-08

## 2024-04-16 RX ORDER — LISINOPRIL 10 MG/1
5 TABLET ORAL DAILY
COMMUNITY
Start: 2024-04-07

## 2024-04-16 RX ORDER — AMLODIPINE BESYLATE 2.5 MG/1
2.5 TABLET ORAL DAILY
Qty: 30 TABLET | Refills: 0 | Status: SHIPPED | OUTPATIENT
Start: 2024-04-16

## 2024-04-16 SDOH — ECONOMIC STABILITY: FOOD INSECURITY: WITHIN THE PAST 12 MONTHS, THE FOOD YOU BOUGHT JUST DIDN'T LAST AND YOU DIDN'T HAVE MONEY TO GET MORE.: NEVER TRUE

## 2024-04-16 SDOH — ECONOMIC STABILITY: INCOME INSECURITY: HOW HARD IS IT FOR YOU TO PAY FOR THE VERY BASICS LIKE FOOD, HOUSING, MEDICAL CARE, AND HEATING?: NOT HARD AT ALL

## 2024-04-16 SDOH — ECONOMIC STABILITY: FOOD INSECURITY: WITHIN THE PAST 12 MONTHS, YOU WORRIED THAT YOUR FOOD WOULD RUN OUT BEFORE YOU GOT MONEY TO BUY MORE.: NEVER TRUE

## 2024-04-16 ASSESSMENT — ENCOUNTER SYMPTOMS
WHEEZING: 0
SORE THROAT: 0
NAUSEA: 0
SHORTNESS OF BREATH: 0
DIARRHEA: 0
EYE PAIN: 0
RHINORRHEA: 0

## 2024-04-16 NOTE — PROGRESS NOTES
[facility]  Precepting Note    Subjective:  74 yo F here for hospital f/u  She has h/o CAD and valvular heart disease  Was hospitalized for afib with RVR  She was started on metoprolol  and started on anticoagulation.   Confused about her medications.   Lisinopril and lasix were held  Lasix resumed by nephrology as an out patient     Was supposed to take metoprolol     Today feels well   One episode of dizzines; BP in 150's, normal pulse ox  ROS otherwise negative    Past medical, surgical, family and social history were reviewed, non-contributory, and unchanged unless otherwise stated.    Objective:    BP (!) 136/32   Pulse 53   Temp 98 °F (36.7 °C)   Ht 1.626 m (5' 4.02\")   Wt 93.1 kg (205 lb 4 oz)   SpO2 97%   BMI 35.21 kg/m²     Exam is as noted by resident with the following changes, additions or corrections:    General:  NAD; alert & oriented x 3   Heart:  mild bradycardia/ +gallop  Lungs:  CTA bilaterally, no wheeze, rales or rhonchi    Assessment/Plan:    Afib  Bradycardia  CKD with recent acute on chronic kidney injury  Mild hyperkalemia  Medication adherence concerns  Clarify need to take eliquis twice daily  Caution with ace with elevated potassium and ckd - no clear direction from nephrology to restart  Labs today to reassess kidney function/ labs   Attending Physician Statement  I have reviewed the chart, including any radiology or labs, and have seen the patient with the resident(s).  I personally reviewed and performed key elements of the history and exam.  I agree with the assessment, plan and orders as documented by the resident.  Please refer to the resident note for additional information.      Electronically signed by Lidia James MD on 4/16/2024 at 10:19 AM    
 Post-Discharge Transitional Care Follow Up      Lula Conn   YOB: 1950    Date of Office Visit:  4/16/2024  Date of Hospital Admission: 3/30/24  Date of Hospital Discharge: 4/2/24  Readmission Risk Score (high >=14%. Medium >=10%):Readmission Risk Score: 9.2      Care management risk score Rising risk (score 2-5) and Complex Care (Scores >=6): No Risk Score On File     Non face to face  following discharge, date last encounter closed (first attempt may have been earlier): 04/03/2024     Call initiated 2 business days of discharge: Yes     Hospital discharge follow-up  -     [facility] DISCHARGE MEDS RECONCILED W/ CURRENT OUTPATIENT MED LIST  Bradycardia  Severe obesity (BMI 35.0-39.9) with comorbidity (HCC)  Atrial fibrillation, unspecified type (HCC)  Hyperkalemia  -     Comprehensive Metabolic Panel; Future  Primary hypertension    73-year-old female coming in for hospital follow-up after being found in A-fib RVR.  Today, patient has regular rate and rhythm and is asymptomatic.  Med rec cardiac medication.  Metoprolol 25 daily with parameters given low heart rate.  Eliquis 5 mg twice daily, Lipitor 10 mg.  Patient will be following up with cardiology and EP.    From a blood pressure standpoint, we will discontinue lisinopril for now, switch medication to Norvasc 2.5 mg.  patient to follow-up with nephrology in July will need to be seen earlier than that.    Repeat CMP today    Medical Decision Making: low complexity  Return in about 1 month (around 5/16/2024) for bradycardia/afib.           Subjective:   HPI    Hx of CAD s/p bypass, AVR, MVR, CKD  In  hosp 3/30-4/2  Cc - Palpitations hr 130-150s, Afib rvr  Chads 6 heparin transitioned eliquis  Cont metoprolol BID  Zio patch for home to check a fib burden    Metoprolol 25 mg po BID -> QD  Eliquis 5 mg po BID  Stopped taking last couple days?  Lipitor 10 mg    Stopped  Lasix which was restarted?  Lisinopril  --> patient restarted?    Today, feeling 
importance of keeping up with recommended health maintenance and to schedule as soon as possible if overdue, as this is important in assessing for undiagnosed pathology, especially cancer, as well as protecting against potentially harmful/life threatening disease. ***     I encourage further reading and education about your health conditions .Information on many healthconditions is provided by the American Academy [employer]: https://familydoctor.org/  Please bring any questions to me at your next visit.    This document may have been prepared at least partially through the use of voice recognition software. Although effort is taken to assure the accuracy of this document, it is possible that grammatical, syntax,  or spelling errors may occur.    No follow-ups on file.    Electronically signed by Maximilian Coronel MD on 4/14/24 at 10:04 PM EDT

## 2024-05-02 RX ORDER — LISINOPRIL 10 MG/1
10 TABLET ORAL DAILY
Qty: 90 TABLET | OUTPATIENT
Start: 2024-05-02

## 2024-05-08 RX ORDER — AMLODIPINE BESYLATE 2.5 MG/1
2.5 TABLET ORAL DAILY
Qty: 30 TABLET | Refills: 0 | Status: SHIPPED | OUTPATIENT
Start: 2024-05-08

## 2024-06-06 RX ORDER — AMLODIPINE BESYLATE 2.5 MG/1
2.5 TABLET ORAL DAILY
Qty: 30 TABLET | Refills: 0 | Status: SHIPPED | OUTPATIENT
Start: 2024-06-06

## 2024-06-17 ENCOUNTER — OFFICE VISIT (OUTPATIENT)
Dept: FAMILY MEDICINE CLINIC | Age: 74
End: 2024-06-17
Payer: MEDICARE

## 2024-06-17 VITALS
WEIGHT: 205 LBS | BODY MASS INDEX: 35 KG/M2 | DIASTOLIC BLOOD PRESSURE: 82 MMHG | RESPIRATION RATE: 16 BRPM | OXYGEN SATURATION: 98 % | TEMPERATURE: 97 F | HEART RATE: 77 BPM | SYSTOLIC BLOOD PRESSURE: 136 MMHG | HEIGHT: 64 IN

## 2024-06-17 DIAGNOSIS — I10 ESSENTIAL HYPERTENSION: ICD-10-CM

## 2024-06-17 DIAGNOSIS — E66.9 TYPE 2 DIABETES MELLITUS WITH OBESITY (HCC): ICD-10-CM

## 2024-06-17 DIAGNOSIS — E11.69 TYPE 2 DIABETES MELLITUS WITH OBESITY (HCC): ICD-10-CM

## 2024-06-17 DIAGNOSIS — R53.83 OTHER FATIGUE: ICD-10-CM

## 2024-06-17 DIAGNOSIS — J44.9 CHRONIC OBSTRUCTIVE PULMONARY DISEASE, UNSPECIFIED COPD TYPE (HCC): ICD-10-CM

## 2024-06-17 DIAGNOSIS — I48.91 ATRIAL FIBRILLATION, UNSPECIFIED TYPE (HCC): ICD-10-CM

## 2024-06-17 LAB — TSH SERPL DL<=0.05 MIU/L-ACNC: 3.02 UIU/ML (ref 0.27–4.2)

## 2024-06-17 PROCEDURE — 3075F SYST BP GE 130 - 139MM HG: CPT

## 2024-06-17 PROCEDURE — 2022F DILAT RTA XM EVC RTNOPTHY: CPT

## 2024-06-17 PROCEDURE — 1090F PRES/ABSN URINE INCON ASSESS: CPT

## 2024-06-17 PROCEDURE — G8427 DOCREV CUR MEDS BY ELIG CLIN: HCPCS

## 2024-06-17 PROCEDURE — 3078F DIAST BP <80 MM HG: CPT

## 2024-06-17 PROCEDURE — 3046F HEMOGLOBIN A1C LEVEL >9.0%: CPT

## 2024-06-17 PROCEDURE — 3017F COLORECTAL CA SCREEN DOC REV: CPT

## 2024-06-17 PROCEDURE — 3023F SPIROM DOC REV: CPT

## 2024-06-17 PROCEDURE — 99214 OFFICE O/P EST MOD 30 MIN: CPT

## 2024-06-17 PROCEDURE — 1123F ACP DISCUSS/DSCN MKR DOCD: CPT

## 2024-06-17 PROCEDURE — 36415 COLL VENOUS BLD VENIPUNCTURE: CPT

## 2024-06-17 PROCEDURE — G8417 CALC BMI ABV UP PARAM F/U: HCPCS

## 2024-06-17 PROCEDURE — G8400 PT W/DXA NO RESULTS DOC: HCPCS

## 2024-06-17 PROCEDURE — 1036F TOBACCO NON-USER: CPT

## 2024-06-17 RX ORDER — AMLODIPINE BESYLATE 5 MG/1
5 TABLET ORAL DAILY
Qty: 90 TABLET | Refills: 1 | Status: SHIPPED | OUTPATIENT
Start: 2024-06-17

## 2024-06-17 RX ORDER — LANOLIN ALCOHOL/MO/W.PET/CERES
400 CREAM (GRAM) TOPICAL DAILY
COMMUNITY

## 2024-06-17 NOTE — PROGRESS NOTES
S: 74 y.o. female with   Chief Complaint   Patient presents with    Hypertension     Discuss Amlodipine    Shortness of Breath     O2 at night 1-2 L  3-4 nights weekly         Pt is here bc she is going to have cataract surgery.  She had her norvasc increased to 5.  She has been taking the metoprolol when needed if her HR goes up.  She can't pay for her eliquis and so is not taking it.    She needs her thyroid level changed.   Her sugar was controlled previously but she does not check it at home.  Is using her oxygen when needed at home.      O: VS:  height is 1.626 m (5' 4.02\") and weight is 93 kg (205 lb). Her temporal temperature is 97 °F (36.1 °C). Her blood pressure is 136/82 and her pulse is 77. Her respiration is 16 and oxygen saturation is 98%.   BP Readings from Last 3 Encounters:   06/17/24 136/82   04/16/24 136/72   04/02/24 134/68     See resident note    Impression/Plan:   1) HTN - controlled.  Lisinopril stopped and BMP will be reschecked today.  Cont on the norvasc.  2) afib - cont on metoprolol as needed.  Give number for prescription help.  Not currently taking eliquis due to cost.   3) COPD - uses O2 as needed.  No meds currently.  Will RTC to discuss starting med.  4) DM - check Hba1C today.  No meds.  Pt does not check sugar at home.   5) prev - schedlule for aWV      Health Maintenance Due   Topic Date Due    COVID-19 Vaccine (1) Never done    Diabetic retinal exam  Never done    Hepatitis C screen  Never done    Shingles vaccine (1 of 2) Never done    DEXA (modify frequency per FRAX score)  Never done    Respiratory Syncytial Virus (RSV) Pregnant or age 60 yrs+ (1 - 1-dose 60+ series) Never done    Pneumococcal 65+ years Vaccine (2 of 2 - PPSV23 or PCV20) 11/14/2016    Lipids  07/27/2023    Diabetic Alb to Cr ratio (uACR) test  09/10/2023    Annual Wellness Visit (Medicare)  11/27/2023    Diabetic foot exam  05/16/2024         Attending Physician Statement  I have discussed the case, including 
issues, and, as applicable, read all Rx info from pharmacy to assure aware of all possible risks and side effects of medicationbefore taking.    Patient and/or guardian given opportunity to ask questions/raise concerns.  The patient verbalized comfort and understanding ofinstructions.    Reviewed age and gender appropriate health screening exams and vaccinations.  Advised patient regarding importance of keeping up with recommended health maintenance and to schedule as soon as possible if overdue, as this is important in assessing for undiagnosed pathology, especially cancer, as well as protecting against potentially harmful/life threatening disease.      I encourage further reading and education about your health conditions .Information on many healthconditions is provided by the American Academy of Family Physicians: https://familydoctor.org/  Please bring any questions to me at your next visit.    This document may have been prepared at least partially through the use of voice recognition software. Although effort is taken to assure the accuracy of this document, it is possible that grammatical, syntax,  or spelling errors may occur.    No follow-ups on file.    Electronically signed by Maximilian Coronel MD on 6/16/24 at 2:14 PM EDT

## 2024-06-19 ASSESSMENT — ENCOUNTER SYMPTOMS
WHEEZING: 0
EYE PAIN: 0
DIARRHEA: 0
NAUSEA: 0
SORE THROAT: 0
RHINORRHEA: 0
SHORTNESS OF BREATH: 0

## 2024-06-20 ENCOUNTER — TELEPHONE (OUTPATIENT)
Dept: FAMILY MEDICINE CLINIC | Age: 74
End: 2024-06-20

## 2024-06-20 NOTE — TELEPHONE ENCOUNTER
[facility] calling asking if patient needs cardiac clearance prior to you providing surgical clearance, patient stated that you wanted her to but she told [facility] that she didn't feel that was necessary. Please advise. Thanks!

## 2024-07-31 ENCOUNTER — TELEPHONE (OUTPATIENT)
Dept: FAMILY MEDICINE CLINIC | Age: 74
End: 2024-07-31

## 2024-07-31 DIAGNOSIS — J44.9 CHRONIC OBSTRUCTIVE PULMONARY DISEASE, UNSPECIFIED COPD TYPE (HCC): Primary | ICD-10-CM

## 2024-07-31 NOTE — TELEPHONE ENCOUNTER
Last Appointment   6/17/2024  Next Appointment  Visit date not found  Patient stopped in for a new handicap placard letter if you can do and let patient know.

## 2024-08-23 ENCOUNTER — HOSPITAL ENCOUNTER (EMERGENCY)
Age: 74
Discharge: HOME | End: 2024-08-23
Attending: EMERGENCY MEDICINE
Payer: MEDICARE

## 2024-08-23 ENCOUNTER — APPOINTMENT (OUTPATIENT)
Dept: GENERAL RADIOLOGY | Age: 74
End: 2024-08-23
Payer: MEDICARE

## 2024-08-23 VITALS
HEART RATE: 85 BPM | RESPIRATION RATE: 18 BRPM | SYSTOLIC BLOOD PRESSURE: 129 MMHG | BODY MASS INDEX: 34.83 KG/M2 | OXYGEN SATURATION: 94 % | HEIGHT: 64 IN | WEIGHT: 204 LBS | DIASTOLIC BLOOD PRESSURE: 68 MMHG | TEMPERATURE: 99 F

## 2024-08-23 DIAGNOSIS — U07.1 COVID-19: Primary | ICD-10-CM

## 2024-08-23 LAB
B PARAP IS1001 DNA NPH QL NAA+NON-PROBE: NOT DETECTED
B PERT DNA SPEC QL NAA+PROBE: NOT DETECTED
C PNEUM DNA NPH QL NAA+NON-PROBE: NOT DETECTED
FLUAV RNA NPH QL NAA+NON-PROBE: NOT DETECTED
FLUAV RNA RESP QL NAA+PROBE: NOT DETECTED
FLUBV RNA NPH QL NAA+NON-PROBE: NOT DETECTED
FLUBV RNA RESP QL NAA+PROBE: NOT DETECTED
HADV DNA NPH QL NAA+NON-PROBE: NOT DETECTED
HCOV 229E RNA NPH QL NAA+NON-PROBE: NOT DETECTED
HCOV HKU1 RNA NPH QL NAA+NON-PROBE: NOT DETECTED
HCOV NL63 RNA NPH QL NAA+NON-PROBE: NOT DETECTED
HCOV OC43 RNA NPH QL NAA+NON-PROBE: NOT DETECTED
HMPV RNA NPH QL NAA+NON-PROBE: NOT DETECTED
HPIV1 RNA NPH QL NAA+NON-PROBE: NOT DETECTED
HPIV2 RNA NPH QL NAA+NON-PROBE: NOT DETECTED
HPIV3 RNA NPH QL NAA+NON-PROBE: NOT DETECTED
HPIV4 RNA NPH QL NAA+NON-PROBE: NOT DETECTED
M PNEUMO DNA NPH QL NAA+NON-PROBE: NOT DETECTED
RSV RNA NPH QL NAA+NON-PROBE: NOT DETECTED
RV+EV RNA NPH QL NAA+NON-PROBE: NOT DETECTED
SARS-COV-2 RNA NPH QL NAA+NON-PROBE: DETECTED
SARS-COV-2 RNA RESP QL NAA+PROBE: DETECTED
SOURCE: ABNORMAL
SPECIMEN DESCRIPTION: ABNORMAL
SPECIMEN DESCRIPTION: ABNORMAL
SPECIMEN SOURCE: NORMAL
STREP A, MOLECULAR: NEGATIVE

## 2024-08-23 PROCEDURE — 71046 X-RAY EXAM CHEST 2 VIEWS: CPT

## 2024-08-23 PROCEDURE — 0202U NFCT DS 22 TRGT SARS-COV-2: CPT

## 2024-08-23 PROCEDURE — 99284 EMERGENCY DEPT VISIT MOD MDM: CPT

## 2024-08-23 PROCEDURE — 6370000000 HC RX 637 (ALT 250 FOR IP): Performed by: EMERGENCY MEDICINE

## 2024-08-23 PROCEDURE — 87636 SARSCOV2 & INF A&B AMP PRB: CPT

## 2024-08-23 PROCEDURE — 87651 STREP A DNA AMP PROBE: CPT

## 2024-08-23 RX ORDER — ACETAMINOPHEN 500 MG
1000 TABLET ORAL ONCE
Status: COMPLETED | OUTPATIENT
Start: 2024-08-23 | End: 2024-08-23

## 2024-08-23 RX ORDER — GUAIFENESIN AND DEXTROMETHORPHAN HYDROBROMIDE 1200; 60 MG/1; MG/1
1 TABLET, EXTENDED RELEASE ORAL EVERY 12 HOURS PRN
Qty: 28 TABLET | Refills: 0 | Status: SHIPPED | OUTPATIENT
Start: 2024-08-23

## 2024-08-23 RX ORDER — PREDNISONE 50 MG/1
50 TABLET ORAL DAILY
Qty: 5 TABLET | Refills: 0 | Status: SHIPPED | OUTPATIENT
Start: 2024-08-23 | End: 2024-08-28

## 2024-08-23 RX ORDER — ALBUTEROL SULFATE 90 UG/1
2 AEROSOL, METERED RESPIRATORY (INHALATION) 4 TIMES DAILY PRN
Qty: 18 G | Refills: 0 | Status: SHIPPED | OUTPATIENT
Start: 2024-08-23

## 2024-08-23 RX ORDER — IPRATROPIUM BROMIDE AND ALBUTEROL SULFATE 2.5; .5 MG/3ML; MG/3ML
1 SOLUTION RESPIRATORY (INHALATION) ONCE
Status: COMPLETED | OUTPATIENT
Start: 2024-08-23 | End: 2024-08-23

## 2024-08-23 RX ADMIN — IPRATROPIUM BROMIDE AND ALBUTEROL SULFATE 1 DOSE: .5; 3 SOLUTION RESPIRATORY (INHALATION) at 08:40

## 2024-08-23 RX ADMIN — ACETAMINOPHEN 1000 MG: 500 TABLET ORAL at 08:40

## 2024-08-23 ASSESSMENT — PAIN DESCRIPTION - LOCATION: LOCATION: THROAT

## 2024-08-23 ASSESSMENT — ENCOUNTER SYMPTOMS: COUGH: 1

## 2024-08-23 ASSESSMENT — PAIN DESCRIPTION - DESCRIPTORS: DESCRIPTORS: ACHING;SORE;DISCOMFORT

## 2024-08-23 ASSESSMENT — PAIN SCALES - GENERAL: PAINLEVEL_OUTOF10: 5

## 2024-08-23 NOTE — ED PROVIDER NOTES
[facility] EMERGENCY DEPARTMENT  EMERGENCY DEPARTMENT ENCOUNTER        Pt Name: Lula Conn  MRN: [id number]  Birthdate 1950  Date of evaluation: 8/23/2024  Provider: Lowell Gonzalez DO  PCP: Maximilian Coronel MD  Note Started: 8:18 AM EDT 8/23/24    CHIEF COMPLAINT       Chief Complaint   Patient presents with    Cough     Cough for 3 days        HISTORY OF PRESENT ILLNESS: 1 or more Elements     History from : Patient    Limitations to history : None    Lula Conn is a 74 y.o. female who presents w history of afib for cough congestion bodyaches.  Patient states her  had recent mild URI symptoms.  Patient denies any fever she denies any chest pain or shortness of breath she denies any palpitations denies any abdominal pain or diarrhea    Nursing Notes were all reviewed and agreed with or any disagreements were addressed in the HPI.    REVIEW OF SYSTEMS :      Review of Systems   Constitutional:  Positive for fatigue and fever.   HENT:  Positive for congestion.    Respiratory:  Positive for cough.        Positives and Pertinent negatives as per HPI.     SURGICAL HISTORY     Past Surgical History:   Procedure Laterality Date    AORTIC VALVE REPLACEMENT      CARDIAC SURGERY      \"homograph\" valve replacement    CATARACT EXTRACTION W/ INTRAOCULAR LENS IMPLANT Left     COLONOSCOPY      ENDOSCOPY, COLON, DIAGNOSTIC      GASTROSTOMY TUBE PLACEMENT  09/26/2016    THORACENTESIS  10/14/2016    VENA CAVA FILTER PLACEMENT Right 10/14/2016       CURRENTMEDICATIONS       Previous Medications    AMLODIPINE (NORVASC) 5 MG TABLET    Take 1 tablet by mouth daily    APIXABAN (ELIQUIS) 5 MG TABS TABLET    Take 1 tablet by mouth 2 times daily    ATORVASTATIN (LIPITOR) 10 MG TABLET    Take 1 tablet by mouth daily    MAGNESIUM OXIDE (MAG-OX) 400 (240 MG) MG TABLET    Take 1 tablet by mouth daily       ALLERGIES     Peppermint oil, Protamine, Wasp venom, Zosyn [piperacillin  discuss warning signs for when to return to the Emergency Room, and the patient verbalized understanding      Disposition Considerations (tests considered but not done, Shared Decision Making, Pt Expectation of Test or Tx.):   Appropriate for outpatient management        I am the Primary Clinician of Record.    FINAL IMPRESSION      1. COVID-19          DISPOSITION/PLAN     DISPOSITION Decision To Discharge 08/23/2024 09:07:19 AM  Condition at Disposition: Good      PATIENT REFERRED TO:  Maximilian Coronel MD  [address] [id number] [phone]    Schedule an appointment as soon as possible for a visit in 3 days      Nearest Emergency Room    Go to   As needed      DISCHARGE MEDICATIONS:  New Prescriptions    ALBUTEROL SULFATE HFA (VENTOLIN HFA) 108 (90 BASE) MCG/ACT INHALER    Inhale 2 puffs into the lungs 4 times daily as needed for Wheezing    DEXTROMETHORPHAN-GUAIFENESIN (MUCINEX DM MAXIMUM STRENGTH)  MG TB12    Take 1 tablet by mouth every 12 hours as needed (cough and congestion)    PREDNISONE (DELTASONE) 50 MG TABLET    Take 1 tablet by mouth daily for 5 days       DISCONTINUED MEDICATIONS:  Discontinued Medications    No medications on file              (Please note that portions of this note were completed with a voice recognition program.  Efforts were made to edit the dictations but occasionally words are mis-transcribed.)    Lowell Gonzalez DO (electronically signed)           Lowell Gonzalez DO  08/23/24 [id number]

## 2024-08-23 NOTE — ED NOTES
Pulse oximeter given to pt with demonstration and education. Pt verbalized and demonstrated understanding

## 2024-08-23 NOTE — DISCHARGE INSTRUCTIONS
Call family doctor tomorrow and schedule a followup appointment to be seen in 2 days    Have your doctor obtain  finalized report of all results    Results for orders placed or performed during the hospital encounter of 08/23/24   COVID-19 & Influenza Combo    Specimen: Nasopharyngeal Swab   Result Value Ref Range    Specimen Description .NASOPHARYNGEAL SWAB     Source .NASOPHARYNGEAL SWAB     SARS-CoV-2 RNA, RT PCR DETECTED (A) Not Detected    Influenza A Not Detected Not Detected    Influenza B Not Detected Not Detected   Rapid Strep Screen    Specimen: Throat   Result Value Ref Range    Source .THROAT SWAB     Strep A, Molecular NEGATIVE NEGATIVE

## 2024-08-27 ENCOUNTER — OFFICE VISIT (OUTPATIENT)
Dept: PRIMARY CARE CLINIC | Age: 74
End: 2024-08-27
Payer: MEDICARE

## 2024-08-27 VITALS
WEIGHT: 198 LBS | DIASTOLIC BLOOD PRESSURE: 70 MMHG | HEART RATE: 68 BPM | SYSTOLIC BLOOD PRESSURE: 120 MMHG | TEMPERATURE: 97.6 F | OXYGEN SATURATION: 96 % | RESPIRATION RATE: 17 BRPM | BODY MASS INDEX: 33.8 KG/M2 | HEIGHT: 64 IN

## 2024-08-27 DIAGNOSIS — I10 ESSENTIAL HYPERTENSION: Primary | ICD-10-CM

## 2024-08-27 DIAGNOSIS — Z98.890 S/P MVR (MITRAL VALVE REPAIR): ICD-10-CM

## 2024-08-27 DIAGNOSIS — I48.92 PAROXYSMAL ATRIAL FLUTTER (HCC): ICD-10-CM

## 2024-08-27 DIAGNOSIS — E78.2 MIXED HYPERLIPIDEMIA: ICD-10-CM

## 2024-08-27 DIAGNOSIS — J44.9 CHRONIC OBSTRUCTIVE PULMONARY DISEASE, UNSPECIFIED COPD TYPE (HCC): ICD-10-CM

## 2024-08-27 DIAGNOSIS — N18.32 STAGE 3B CHRONIC KIDNEY DISEASE (HCC): ICD-10-CM

## 2024-08-27 DIAGNOSIS — U07.1 COVID-19 VIRUS INFECTION: ICD-10-CM

## 2024-08-27 LAB
ALBUMIN: 4 G/DL (ref 3.5–5.2)
ALP BLD-CCNC: 62 U/L (ref 35–104)
ALT SERPL-CCNC: 26 U/L (ref 0–32)
ANION GAP SERPL CALCULATED.3IONS-SCNC: 13 MMOL/L (ref 7–16)
AST SERPL-CCNC: 23 U/L (ref 0–31)
BILIRUB SERPL-MCNC: 0.3 MG/DL (ref 0–1.2)
BUN BLDV-MCNC: 46 MG/DL (ref 6–23)
CALCIUM SERPL-MCNC: 9.6 MG/DL (ref 8.6–10.2)
CHLORIDE BLD-SCNC: 101 MMOL/L (ref 98–107)
CHOLESTEROL, TOTAL: 257 MG/DL
CO2: 22 MMOL/L (ref 22–29)
CREAT SERPL-MCNC: 1.4 MG/DL (ref 0.5–1)
GFR, ESTIMATED: 38 ML/MIN/1.73M2
GLUCOSE BLD-MCNC: 78 MG/DL (ref 74–99)
HBA1C MFR BLD: 6.7 % (ref 4–5.6)
HCT VFR BLD CALC: 44.8 % (ref 34–48)
HDLC SERPL-MCNC: 42 MG/DL
HEMOGLOBIN: 14.5 G/DL (ref 11.5–15.5)
LDL CHOLESTEROL: 172 MG/DL
MCH RBC QN AUTO: 29.6 PG (ref 26–35)
MCHC RBC AUTO-ENTMCNC: 32.4 G/DL (ref 32–34.5)
MCV RBC AUTO: 91.4 FL (ref 80–99.9)
PDW BLD-RTO: 13.6 % (ref 11.5–15)
PLATELET # BLD: 186 K/UL (ref 130–450)
PMV BLD AUTO: 10.3 FL (ref 7–12)
POTASSIUM SERPL-SCNC: 4.9 MMOL/L (ref 3.5–5)
RBC # BLD: 4.9 M/UL (ref 3.5–5.5)
SODIUM BLD-SCNC: 136 MMOL/L (ref 132–146)
TOTAL PROTEIN: 7.4 G/DL (ref 6.4–8.3)
TRIGL SERPL-MCNC: 215 MG/DL
TSH SERPL DL<=0.05 MIU/L-ACNC: 2.42 UIU/ML (ref 0.27–4.2)
VLDLC SERPL CALC-MCNC: 43 MG/DL
WBC # BLD: 9.1 K/UL (ref 4.5–11.5)

## 2024-08-27 PROCEDURE — 1036F TOBACCO NON-USER: CPT | Performed by: INTERNAL MEDICINE

## 2024-08-27 PROCEDURE — 3017F COLORECTAL CA SCREEN DOC REV: CPT | Performed by: INTERNAL MEDICINE

## 2024-08-27 PROCEDURE — G8427 DOCREV CUR MEDS BY ELIG CLIN: HCPCS | Performed by: INTERNAL MEDICINE

## 2024-08-27 PROCEDURE — 1123F ACP DISCUSS/DSCN MKR DOCD: CPT | Performed by: INTERNAL MEDICINE

## 2024-08-27 PROCEDURE — G8417 CALC BMI ABV UP PARAM F/U: HCPCS | Performed by: INTERNAL MEDICINE

## 2024-08-27 PROCEDURE — 1090F PRES/ABSN URINE INCON ASSESS: CPT | Performed by: INTERNAL MEDICINE

## 2024-08-27 PROCEDURE — 99204 OFFICE O/P NEW MOD 45 MIN: CPT | Performed by: INTERNAL MEDICINE

## 2024-08-27 PROCEDURE — 3023F SPIROM DOC REV: CPT | Performed by: INTERNAL MEDICINE

## 2024-08-27 PROCEDURE — 3078F DIAST BP <80 MM HG: CPT | Performed by: INTERNAL MEDICINE

## 2024-08-27 PROCEDURE — 36415 COLL VENOUS BLD VENIPUNCTURE: CPT | Performed by: INTERNAL MEDICINE

## 2024-08-27 PROCEDURE — G8400 PT W/DXA NO RESULTS DOC: HCPCS | Performed by: INTERNAL MEDICINE

## 2024-08-27 PROCEDURE — 3074F SYST BP LT 130 MM HG: CPT | Performed by: INTERNAL MEDICINE

## 2024-08-27 RX ORDER — METOPROLOL SUCCINATE 25 MG/1
25 TABLET, EXTENDED RELEASE ORAL DAILY
COMMUNITY

## 2024-08-27 RX ORDER — FUROSEMIDE 20 MG
20 TABLET ORAL DAILY
COMMUNITY
Start: 2024-08-10

## 2024-08-27 SDOH — ECONOMIC STABILITY: FOOD INSECURITY: WITHIN THE PAST 12 MONTHS, YOU WORRIED THAT YOUR FOOD WOULD RUN OUT BEFORE YOU GOT MONEY TO BUY MORE.: NEVER TRUE

## 2024-08-27 SDOH — ECONOMIC STABILITY: INCOME INSECURITY: HOW HARD IS IT FOR YOU TO PAY FOR THE VERY BASICS LIKE FOOD, HOUSING, MEDICAL CARE, AND HEATING?: HARD

## 2024-08-27 SDOH — ECONOMIC STABILITY: FOOD INSECURITY: WITHIN THE PAST 12 MONTHS, THE FOOD YOU BOUGHT JUST DIDN'T LAST AND YOU DIDN'T HAVE MONEY TO GET MORE.: NEVER TRUE

## 2024-08-27 ASSESSMENT — ENCOUNTER SYMPTOMS
COUGH: 0
ABDOMINAL PAIN: 0
SHORTNESS OF BREATH: 0
NAUSEA: 0
DIARRHEA: 0
VOMITING: 0

## 2024-08-27 NOTE — PROGRESS NOTES
are equal, round, and reactive to light.   Neck:      Thyroid: No thyromegaly.      Vascular: No carotid bruit or JVD.   Cardiovascular:      Heart sounds: No murmur heard.     No gallop.   Pulmonary:      Effort: Pulmonary effort is normal.      Breath sounds: Rhonchi present.   Abdominal:      Palpations: There is no hepatomegaly or splenomegaly.      Tenderness: There is no abdominal tenderness.   Skin:     Coloration: Skin is not cyanotic.      Findings: No bruising or rash.      Nails: There is no clubbing.   Neurological:      General: No focal deficit present.       Extremities: Pedal pulses No Edema     ASSESSMENT/PLAN  Lula was seen today for new patient.    Diagnoses and all orders for this visit:    Essential hypertension,controlled continue Norvasc   -     CBC  -     Comprehensive Metabolic Panel    Chronic obstructive pulmonary disease, unspecified COPD type (HCC), stable continue Albuterol     COVID-19 virus infection, tylenol prn , cough syrup   -     CBC    Paroxysmal atrial flutter (HCC) stopped Eliquis due to cost ,  -     TSH    Stage 3b chronic kidney disease (HCC) will monitor avoid nephrotoxic   -     Comprehensive Metabolic Panel    Mixed hyperlipidemia ,will check lipid ,continue Lipitor   -     Hemoglobin A1C  -     Lipid Panel    All previous record reviewed     Outpatient Encounter Medications as of 8/27/2024   Medication Sig Dispense Refill    furosemide (LASIX) 20 MG tablet Take 1 tablet by mouth daily      metoprolol succinate (TOPROL XL) 25 MG extended release tablet Take 1 tablet by mouth daily      predniSONE (DELTASONE) 50 MG tablet Take 1 tablet by mouth daily for 5 days 5 tablet 0    Dextromethorphan-guaiFENesin (MUCINEX DM MAXIMUM STRENGTH)  MG TB12 Take 1 tablet by mouth every 12 hours as needed (cough and congestion) 28 tablet 0    albuterol sulfate HFA (VENTOLIN HFA) 108 (90 Base) MCG/ACT inhaler Inhale 2 puffs into the lungs 4 times daily as needed for Wheezing 18 g  0    magnesium oxide (MAG-OX) 400 (240 Mg) MG tablet Take 1 tablet by mouth daily      amLODIPine (NORVASC) 5 MG tablet Take 1 tablet by mouth daily 90 tablet 1    atorvastatin (LIPITOR) 10 MG tablet Take 1 tablet by mouth daily 30 tablet 0    [DISCONTINUED] apixaban (ELIQUIS) 5 MG TABS tablet Take 1 tablet by mouth 2 times daily (Patient not taking: Reported on 4/16/2024) 60 tablet 3     Facility-Administered Encounter Medications as of 8/27/2024   Medication Dose Route Frequency Provider Last Rate Last Admin    morphine (PF) injection 2 mg  2 mg IntraVENous Q5 Min PRN Santiago Segal DO           No follow-ups on file.        Reviewed recent labs related to Lula's current problems      Discussed importance of regular Health Maintenance follow up  Health Maintenance   Topic    Diabetic retinal exam     Hepatitis C screen     Shingles vaccine (1 of 2)    DEXA (modify frequency per FRAX score)     Respiratory Syncytial Virus (RSV) Pregnant or age 60 yrs+ (1 - 1-dose 60+ series)    Pneumococcal 65+ years Vaccine (2 of 2 - PPSV23 or PCV20)    Lipids     COVID-19 Vaccine (1 - 2023-24 season)    Diabetic Alb to Cr ratio (uACR) test     Annual Wellness Visit (Medicare)     Diabetic foot exam     Flu vaccine (1)    A1C test (Diabetic or Prediabetic)     Breast cancer screen     Depression Screen     GFR test (Diabetes, CKD 3-4, OR last GFR 15-59)     Colorectal Cancer Screen     DTaP/Tdap/Td vaccine (2 - Td or Tdap)    Hepatitis A vaccine     Hepatitis B vaccine     Hib vaccine     Polio vaccine     Meningococcal (ACWY) vaccine

## 2024-08-27 NOTE — PATIENT INSTRUCTIONS
[place] Financial Resources*  (Call [employer]/211 if need more resources.)         HELP NETWORK OF [facility]:  What they do: Provides 24-hr, 7 days a week access to information on community resources for financial help. [facility] AND [facility]  Phone: 211 or [phone]    [facility] FAMILY SERVICE: [address], [place] [address]  What they offer: Limited assistance to restore/ prevent utility disconnection.  Phone Number: [phone]  Website: [employer]    DEPARTMENT OF JOB AND FAMILY SERVICES:  MAIN [facility] LINE FOR ALL [facility]: [phone]  What they do: [place] works first with temporary cash assistance if there are children in household.   [facility] DJFS: [address] [place] [address]  Phone: [phone], [phone]  [facility] DJFS: [address] [place] [address]  Phone: [phone]  [facility] DJFS: [address] [place] [address]  Phone: [phone]  Website: s.[place].[facility]    [employer] Financial Assistance  What they offer: Assistance with [employer] bills  Phone: [phone], option #5     Medications:  Good [facility]  What they offer: [employer] tracks prescription drug prices and provides free drug coupons for discounts on medications.  Website: https://www.[employer]    [employer]  What they offer: [employer] offers free information on medications and healthcare cost savings programs including prescription assistance programs, coupons, and discount programs.  Helpline: [phone]  Website: https://www.[employer].org    RX Assist  What they offer: Information about free and low-cost medicine programs.  Website: https://www.rxassist.org/    "[employer]mart $4 Prescription Program  What they offer: Prescription Program includes up to a 30-day supply for $4 and a 90-day supply for $10 of some covered generic drugs at commonly prescribed dosages  Website: https://www.[employer]/cp/4-prescriptions/6486584    [employer]

## 2024-08-29 RX ORDER — ATORVASTATIN CALCIUM 10 MG/1
10 TABLET, FILM COATED ORAL DAILY
Qty: 30 TABLET | Refills: 0 | OUTPATIENT
Start: 2024-08-29

## 2024-08-30 RX ORDER — ATORVASTATIN CALCIUM 20 MG/1
20 TABLET, FILM COATED ORAL DAILY
Qty: 90 TABLET | Refills: 0 | Status: SHIPPED | OUTPATIENT
Start: 2024-08-30

## 2024-09-15 ENCOUNTER — HOSPITAL ENCOUNTER (EMERGENCY)
Age: 74
Discharge: HOME | End: 2024-09-15
Payer: MEDICARE

## 2024-09-15 VITALS
TEMPERATURE: 98.4 F | SYSTOLIC BLOOD PRESSURE: 163 MMHG | WEIGHT: 198 LBS | OXYGEN SATURATION: 95 % | BODY MASS INDEX: 33.99 KG/M2 | RESPIRATION RATE: 16 BRPM | HEART RATE: 74 BPM | DIASTOLIC BLOOD PRESSURE: 70 MMHG

## 2024-09-15 DIAGNOSIS — H66.001 NON-RECURRENT ACUTE SUPPURATIVE OTITIS MEDIA OF RIGHT EAR WITHOUT SPONTANEOUS RUPTURE OF TYMPANIC MEMBRANE: Primary | ICD-10-CM

## 2024-09-15 PROCEDURE — 99283 EMERGENCY DEPT VISIT LOW MDM: CPT

## 2024-09-15 RX ORDER — FLUTICASONE PROPIONATE 50 MCG
2 SPRAY, SUSPENSION (ML) NASAL DAILY
Qty: 16 G | Refills: 0 | Status: SHIPPED | OUTPATIENT
Start: 2024-09-15

## 2024-09-15 RX ORDER — ALBUTEROL SULFATE 90 UG/1
2 INHALANT RESPIRATORY (INHALATION) 4 TIMES DAILY PRN
Qty: 18 G | Refills: 0 | Status: SHIPPED | OUTPATIENT
Start: 2024-09-15

## 2024-09-15 RX ORDER — CETIRIZINE HYDROCHLORIDE, PSEUDOEPHEDRINE HYDROCHLORIDE 5; 120 MG/1; MG/1
1 TABLET, FILM COATED, EXTENDED RELEASE ORAL 2 TIMES DAILY
Qty: 60 TABLET | Refills: 0 | Status: SHIPPED | OUTPATIENT
Start: 2024-09-15 | End: 2024-10-15

## 2024-09-15 RX ORDER — CEFDINIR 300 MG/1
300 CAPSULE ORAL 2 TIMES DAILY
Qty: 20 CAPSULE | Refills: 0 | Status: SHIPPED | OUTPATIENT
Start: 2024-09-15 | End: 2024-09-25

## 2024-09-15 ASSESSMENT — PAIN DESCRIPTION - PAIN TYPE: TYPE: ACUTE PAIN

## 2024-09-15 ASSESSMENT — PAIN DESCRIPTION - DESCRIPTORS: DESCRIPTORS: ACHING;DISCOMFORT;SORE;TENDER

## 2024-09-15 ASSESSMENT — PAIN - FUNCTIONAL ASSESSMENT: PAIN_FUNCTIONAL_ASSESSMENT: 0-10

## 2024-09-15 ASSESSMENT — PAIN SCALES - GENERAL: PAINLEVEL_OUTOF10: 10

## 2024-09-15 ASSESSMENT — PAIN DESCRIPTION - ORIENTATION: ORIENTATION: RIGHT

## 2024-09-15 ASSESSMENT — PAIN DESCRIPTION - FREQUENCY: FREQUENCY: CONTINUOUS

## 2024-09-15 ASSESSMENT — PAIN DESCRIPTION - LOCATION: LOCATION: EAR

## 2024-09-15 ASSESSMENT — PAIN DESCRIPTION - ONSET: ONSET: ON-GOING

## 2024-09-24 ENCOUNTER — OFFICE VISIT (OUTPATIENT)
Dept: PRIMARY CARE CLINIC | Age: 74
End: 2024-09-24
Payer: MEDICARE

## 2024-09-24 VITALS
DIASTOLIC BLOOD PRESSURE: 70 MMHG | OXYGEN SATURATION: 95 % | SYSTOLIC BLOOD PRESSURE: 126 MMHG | HEIGHT: 64 IN | RESPIRATION RATE: 16 BRPM | TEMPERATURE: 97.6 F | BODY MASS INDEX: 34.66 KG/M2 | HEART RATE: 68 BPM | WEIGHT: 203 LBS

## 2024-09-24 DIAGNOSIS — E78.2 MIXED HYPERLIPIDEMIA: ICD-10-CM

## 2024-09-24 DIAGNOSIS — N18.32 STAGE 3B CHRONIC KIDNEY DISEASE (HCC): ICD-10-CM

## 2024-09-24 DIAGNOSIS — I48.92 PAROXYSMAL ATRIAL FLUTTER (HCC): ICD-10-CM

## 2024-09-24 DIAGNOSIS — J44.9 CHRONIC OBSTRUCTIVE PULMONARY DISEASE, UNSPECIFIED COPD TYPE (HCC): ICD-10-CM

## 2024-09-24 DIAGNOSIS — I10 ESSENTIAL HYPERTENSION: Primary | ICD-10-CM

## 2024-09-24 DIAGNOSIS — Z98.890 S/P MVR (MITRAL VALVE REPAIR): ICD-10-CM

## 2024-09-24 PROCEDURE — 3078F DIAST BP <80 MM HG: CPT | Performed by: INTERNAL MEDICINE

## 2024-09-24 PROCEDURE — G8400 PT W/DXA NO RESULTS DOC: HCPCS | Performed by: INTERNAL MEDICINE

## 2024-09-24 PROCEDURE — G8417 CALC BMI ABV UP PARAM F/U: HCPCS | Performed by: INTERNAL MEDICINE

## 2024-09-24 PROCEDURE — G8427 DOCREV CUR MEDS BY ELIG CLIN: HCPCS | Performed by: INTERNAL MEDICINE

## 2024-09-24 PROCEDURE — 1090F PRES/ABSN URINE INCON ASSESS: CPT | Performed by: INTERNAL MEDICINE

## 2024-09-24 PROCEDURE — 3074F SYST BP LT 130 MM HG: CPT | Performed by: INTERNAL MEDICINE

## 2024-09-24 PROCEDURE — 1036F TOBACCO NON-USER: CPT | Performed by: INTERNAL MEDICINE

## 2024-09-24 PROCEDURE — 3017F COLORECTAL CA SCREEN DOC REV: CPT | Performed by: INTERNAL MEDICINE

## 2024-09-24 PROCEDURE — 3023F SPIROM DOC REV: CPT | Performed by: INTERNAL MEDICINE

## 2024-09-24 PROCEDURE — 1123F ACP DISCUSS/DSCN MKR DOCD: CPT | Performed by: INTERNAL MEDICINE

## 2024-09-24 PROCEDURE — 99214 OFFICE O/P EST MOD 30 MIN: CPT | Performed by: INTERNAL MEDICINE

## 2024-09-24 RX ORDER — ROSUVASTATIN CALCIUM 10 MG/1
10 TABLET, COATED ORAL NIGHTLY
Qty: 30 TABLET | Refills: 3 | Status: SHIPPED | OUTPATIENT
Start: 2024-09-24

## 2024-09-24 RX ORDER — METOPROLOL SUCCINATE 25 MG/1
25 TABLET, EXTENDED RELEASE ORAL DAILY
Qty: 30 TABLET | Refills: 2 | Status: SHIPPED | OUTPATIENT
Start: 2024-09-24

## 2024-09-24 ASSESSMENT — ENCOUNTER SYMPTOMS
VOMITING: 0
SHORTNESS OF BREATH: 0
ABDOMINAL PAIN: 0
NAUSEA: 0
DIARRHEA: 0
COUGH: 0

## 2024-10-11 ENCOUNTER — OFFICE VISIT (OUTPATIENT)
Dept: ORTHOPEDIC SURGERY | Age: 74
End: 2024-10-11
Payer: MEDICARE

## 2024-10-11 VITALS — WEIGHT: 203 LBS | BODY MASS INDEX: 34.66 KG/M2 | HEIGHT: 64 IN

## 2024-10-11 DIAGNOSIS — M54.42 ACUTE LEFT-SIDED LOW BACK PAIN WITH BILATERAL SCIATICA: Primary | ICD-10-CM

## 2024-10-11 DIAGNOSIS — M54.41 ACUTE LEFT-SIDED LOW BACK PAIN WITH BILATERAL SCIATICA: Primary | ICD-10-CM

## 2024-10-11 DIAGNOSIS — G89.29 CHRONIC LEFT-SIDED LOW BACK PAIN, UNSPECIFIED WHETHER SCIATICA PRESENT: ICD-10-CM

## 2024-10-11 DIAGNOSIS — M54.50 CHRONIC LEFT-SIDED LOW BACK PAIN, UNSPECIFIED WHETHER SCIATICA PRESENT: ICD-10-CM

## 2024-10-11 PROCEDURE — 1090F PRES/ABSN URINE INCON ASSESS: CPT | Performed by: FAMILY MEDICINE

## 2024-10-11 PROCEDURE — 3017F COLORECTAL CA SCREEN DOC REV: CPT | Performed by: FAMILY MEDICINE

## 2024-10-11 PROCEDURE — G8417 CALC BMI ABV UP PARAM F/U: HCPCS | Performed by: FAMILY MEDICINE

## 2024-10-11 PROCEDURE — G8484 FLU IMMUNIZE NO ADMIN: HCPCS | Performed by: FAMILY MEDICINE

## 2024-10-11 PROCEDURE — G8400 PT W/DXA NO RESULTS DOC: HCPCS | Performed by: FAMILY MEDICINE

## 2024-10-11 PROCEDURE — 99203 OFFICE O/P NEW LOW 30 MIN: CPT | Performed by: FAMILY MEDICINE

## 2024-10-11 PROCEDURE — G8427 DOCREV CUR MEDS BY ELIG CLIN: HCPCS | Performed by: FAMILY MEDICINE

## 2024-10-11 PROCEDURE — 1036F TOBACCO NON-USER: CPT | Performed by: FAMILY MEDICINE

## 2024-10-11 PROCEDURE — 1123F ACP DISCUSS/DSCN MKR DOCD: CPT | Performed by: FAMILY MEDICINE

## 2024-10-11 RX ORDER — GABAPENTIN 300 MG/1
300 CAPSULE ORAL NIGHTLY
Qty: 90 CAPSULE | Refills: 0 | Status: SHIPPED | OUTPATIENT
Start: 2024-10-11 | End: 2025-01-09

## 2024-10-11 NOTE — PROGRESS NOTES
cetirizine-psuedoephedrine (ZYRTEC-D) 5-120 MG per extended release tablet Take 1 tablet by mouth 2 times daily 60 tablet 0    albuterol sulfate HFA (VENTOLIN HFA) 108 (90 Base) MCG/ACT inhaler Inhale 2 puffs into the lungs 4 times daily as needed for Wheezing 18 g 0    furosemide (LASIX) 20 MG tablet Take 1 tablet by mouth daily      Dextromethorphan-guaiFENesin (MUCINEX DM MAXIMUM STRENGTH)  MG TB12 Take 1 tablet by mouth every 12 hours as needed (cough and congestion) 28 tablet 0    albuterol sulfate HFA (VENTOLIN HFA) 108 (90 Base) MCG/ACT inhaler Inhale 2 puffs into the lungs 4 times daily as needed for Wheezing 18 g 0    magnesium oxide (MAG-OX) 400 (240 Mg) MG tablet Take 1 tablet by mouth daily      amLODIPine (NORVASC) 5 MG tablet Take 1 tablet by mouth daily 90 tablet 1     No current facility-administered medications for this visit.     Facility-Administered Medications Ordered in Other Visits   Medication Dose Route Frequency Provider Last Rate Last Admin    morphine (PF) injection 2 mg  2 mg IntraVENous Q5 Min PRN Santiago Segal,           ______________________________________________________________________    Physical Exam :    Vitals: Ht 1.626 m (5' 4\")   Wt 92.1 kg (203 lb)   BMI 34.84 kg/m²   General Appearance: Nontoxic, awake, alert, and in no acute distress.  Chest wall/Lung: Respirations regular and unlabored. No cyanosis.  Heart: RRR, distal pulses intact.  Neurologic: Alert&Oriented x3. Sensation grossly intact. No focal motor deficits detected.  Musculoskeletal: ROM of the lumbar spine is limited by pain.  Mild paraspinal muscle tenderness palpation bilaterally.  Negative SLR bilaterally.  ______________________________________________________________________    Assessment & Plan :    1. Acute left-sided low back pain with bilateral sciatica  Patient presents to the office today for evaluation of low back pain.  History, referring provider note, physical exam and imaging (as

## 2024-10-15 ENCOUNTER — OFFICE VISIT (OUTPATIENT)
Dept: PRIMARY CARE CLINIC | Age: 74
End: 2024-10-15

## 2024-10-15 VITALS
HEIGHT: 64 IN | HEART RATE: 65 BPM | RESPIRATION RATE: 16 BRPM | SYSTOLIC BLOOD PRESSURE: 122 MMHG | BODY MASS INDEX: 34.66 KG/M2 | TEMPERATURE: 97.6 F | OXYGEN SATURATION: 96 % | WEIGHT: 203 LBS | DIASTOLIC BLOOD PRESSURE: 70 MMHG

## 2024-10-15 DIAGNOSIS — Z00.00 MEDICARE ANNUAL WELLNESS VISIT, SUBSEQUENT: Primary | ICD-10-CM

## 2024-10-15 RX ORDER — NAPROXEN 500 MG/1
500 TABLET ORAL 2 TIMES DAILY WITH MEALS
Qty: 30 TABLET | Refills: 0 | Status: SHIPPED | OUTPATIENT
Start: 2024-10-15

## 2024-10-15 RX ORDER — LIDOCAINE 50 MG/G
1 PATCH TOPICAL DAILY
Qty: 10 PATCH | Refills: 0 | Status: SHIPPED | OUTPATIENT
Start: 2024-10-15 | End: 2024-10-25

## 2024-10-15 ASSESSMENT — PATIENT HEALTH QUESTIONNAIRE - PHQ9
SUM OF ALL RESPONSES TO PHQ QUESTIONS 1-9: 0
SUM OF ALL RESPONSES TO PHQ9 QUESTIONS 1 & 2: 0
1. LITTLE INTEREST OR PLEASURE IN DOING THINGS: NOT AT ALL
2. FEELING DOWN, DEPRESSED OR HOPELESS: NOT AT ALL
SUM OF ALL RESPONSES TO PHQ QUESTIONS 1-9: 0

## 2024-10-15 NOTE — PROGRESS NOTES
Medicare Annual Wellness Visit    Lula Conn is here for Medicare AWV (Patient is here for back pain would like a steroid injection )    Assessment & Plan   Medicare annual wellness visit, subsequent  Recommendations for Preventive Services Due: see orders and patient instructions/AVS.  Recommended screening schedule for the next 5-10 years is provided to the patient in written form: see Patient Instructions/AVS.     Return for Medicare Annual Wellness Visit in 1 year.     Subjective       Patient's complete Health Risk Assessment and screening values have been reviewed and are found in Flowsheets. The following problems were reviewed today and where indicated follow up appointments were made and/or referrals ordered.    Positive Risk Factor Screenings with Interventions:                Inactivity:  On average, how many days per week do you engage in moderate to strenuous exercise (like a brisk walk)?: 2 days (!) Abnormal  On average, how many minutes do you engage in exercise at this level?: 20 min  Interventions:  To increase activity      Abnormal BMI (obese):  Body mass index is 34.84 kg/m². (!) Abnormal  Interventions:  Advised patient to watch diet, increase activity, and goal to lose weight.                            Objective   Vitals:    10/15/24 1149   BP: 122/70   Pulse: 65   Resp: 16   Temp: 97.6 °F (36.4 °C)   SpO2: 96%   Weight: 92.1 kg (203 lb)   Height: 1.626 m (5' 4\")      Body mass index is 34.84 kg/m².                    Allergies   Allergen Reactions    Peppermint Oil Shortness Of Breath     Chest pain and throat swelling  Other reaction(s): Other: See Comments  Chest pain and throat swelling      Protamine Anaphylaxis and Swelling     STAGE 3, ORGAN FAILURE    Wasp Venom Anaphylaxis    Zosyn [Piperacillin Sod-Tazobactam So] Rash     Widespread rash last time she was given abx    Field Mint [Mentha]     Pcn [Penicillins]     Warfarin Other (See Comments)     Per pt, Coumadin caused

## 2024-10-15 NOTE — PATIENT INSTRUCTIONS
Learning About Being Active as an Older Adult  Why is being active important as you get older?     Being active is one of the best things you can do for your health. And it's never too late to start. Being active--or getting active, if you aren't already--has definite benefits. It can:  Give you more energy,  Keep your mind sharp.  Improve balance to reduce your risk of falls.  Help you manage chronic illness with fewer medicines.  No matter how old you are, how fit you are, or what health problems you have, there is a form of activity that will work for you. And the more physical activity you can do, the better your overall health will be.  What kinds of activity can help you stay healthy?  Being more active will make your daily activities easier. Physical activity includes planned exercise and things you do in daily life. There are four types of activity:  Aerobic.  Doing aerobic activity makes your heart and lungs strong.  Includes walking, dancing, and gardening.  Aim for at least 2½ hours spread throughout the week.  It improves your energy and can help you sleep better.  Muscle-strengthening.  This type of activity can help maintain muscle and strengthen bones.  Includes climbing stairs, using resistance bands, and lifting or carrying heavy loads.  Aim for at least twice a week.  It can help protect the knees and other joints.  Stretching.  Stretching gives you better range of motion in joints and muscles.  Includes upper arm stretches, calf stretches, and gentle yoga.  Aim for at least twice a week, preferably after your muscles are warmed up from other activities.  It can help you function better in daily life.  Balancing.  This helps you stay coordinated and have good posture.  Includes heel-to-toe walking, tanya chi, and certain types of yoga.  Aim for at least 3 days a week.  It can reduce your risk of falling.  Even if you have a hard time meeting the recommendations, it's better to be more active

## 2024-12-17 ENCOUNTER — OFFICE VISIT (OUTPATIENT)
Dept: PRIMARY CARE CLINIC | Age: 74
End: 2024-12-17

## 2024-12-17 VITALS
DIASTOLIC BLOOD PRESSURE: 72 MMHG | SYSTOLIC BLOOD PRESSURE: 118 MMHG | WEIGHT: 206 LBS | BODY MASS INDEX: 35.17 KG/M2 | OXYGEN SATURATION: 98 % | HEIGHT: 64 IN | TEMPERATURE: 97 F | HEART RATE: 78 BPM | RESPIRATION RATE: 16 BRPM

## 2024-12-17 DIAGNOSIS — E78.2 MIXED HYPERLIPIDEMIA: ICD-10-CM

## 2024-12-17 DIAGNOSIS — I10 ESSENTIAL HYPERTENSION: Primary | ICD-10-CM

## 2024-12-17 DIAGNOSIS — Z98.890 S/P MVR (MITRAL VALVE REPAIR): ICD-10-CM

## 2024-12-17 DIAGNOSIS — N18.32 STAGE 3B CHRONIC KIDNEY DISEASE (HCC): ICD-10-CM

## 2024-12-17 DIAGNOSIS — J44.9 CHRONIC OBSTRUCTIVE PULMONARY DISEASE, UNSPECIFIED COPD TYPE (HCC): ICD-10-CM

## 2024-12-17 LAB
ALBUMIN: 4 G/DL (ref 3.5–5.2)
ALP BLD-CCNC: 68 U/L (ref 35–104)
ALT SERPL-CCNC: 11 U/L (ref 0–32)
ANION GAP SERPL CALCULATED.3IONS-SCNC: 13 MMOL/L (ref 7–16)
AST SERPL-CCNC: 17 U/L (ref 0–31)
BILIRUB SERPL-MCNC: 0.3 MG/DL (ref 0–1.2)
BUN BLDV-MCNC: 39 MG/DL (ref 6–23)
CALCIUM SERPL-MCNC: 9.8 MG/DL (ref 8.6–10.2)
CHLORIDE BLD-SCNC: 100 MMOL/L (ref 98–107)
CHOLESTEROL, TOTAL: 274 MG/DL
CO2: 26 MMOL/L (ref 22–29)
CREAT SERPL-MCNC: 1.5 MG/DL (ref 0.5–1)
GFR, ESTIMATED: 38 ML/MIN/1.73M2
GLUCOSE BLD-MCNC: 142 MG/DL (ref 74–99)
HCT VFR BLD CALC: 44.5 % (ref 34–48)
HDLC SERPL-MCNC: 36 MG/DL
HEMOGLOBIN: 14.1 G/DL (ref 11.5–15.5)
LDL CHOLESTEROL: 185 MG/DL
MCH RBC QN AUTO: 30 PG (ref 26–35)
MCHC RBC AUTO-ENTMCNC: 31.7 G/DL (ref 32–34.5)
MCV RBC AUTO: 94.7 FL (ref 80–99.9)
PDW BLD-RTO: 14.1 % (ref 11.5–15)
PLATELET # BLD: 203 K/UL (ref 130–450)
PMV BLD AUTO: 10.2 FL (ref 7–12)
POTASSIUM SERPL-SCNC: 4.5 MMOL/L (ref 3.5–5)
RBC # BLD: 4.7 M/UL (ref 3.5–5.5)
SODIUM BLD-SCNC: 139 MMOL/L (ref 132–146)
TOTAL PROTEIN: 7.3 G/DL (ref 6.4–8.3)
TRIGL SERPL-MCNC: 265 MG/DL
VLDLC SERPL CALC-MCNC: 53 MG/DL
WBC # BLD: 5.8 K/UL (ref 4.5–11.5)

## 2024-12-17 RX ORDER — ROSUVASTATIN CALCIUM 10 MG/1
10 TABLET, COATED ORAL NIGHTLY
Qty: 30 TABLET | Refills: 3 | Status: SHIPPED
Start: 2024-12-17 | End: 2024-12-17 | Stop reason: SINTOL

## 2024-12-17 RX ORDER — NAPROXEN 500 MG/1
500 TABLET ORAL 2 TIMES DAILY WITH MEALS
Qty: 30 TABLET | Refills: 0 | Status: CANCELLED | OUTPATIENT
Start: 2024-12-17

## 2024-12-17 RX ORDER — METOPROLOL SUCCINATE 25 MG/1
25 TABLET, EXTENDED RELEASE ORAL DAILY
Qty: 30 TABLET | Refills: 2 | Status: SHIPPED | OUTPATIENT
Start: 2024-12-17

## 2024-12-17 ASSESSMENT — ENCOUNTER SYMPTOMS
NAUSEA: 0
COUGH: 0
SHORTNESS OF BREATH: 0
DIARRHEA: 0
ABDOMINAL PAIN: 0
VOMITING: 0

## 2024-12-17 NOTE — PROGRESS NOTES
SUBJECTIVE  Lula Conn is a 74 y.o. female established was seen In the office  for evaluation.    HPI/Chief C/O:  Chief Complaint   Patient presents with    Hypertension     Blood pressure check up    Unable to take crestor   Allergies   Allergen Reactions    Peppermint Oil Shortness Of Breath     Chest pain and throat swelling  Other reaction(s): Other: See Comments  Chest pain and throat swelling      Protamine Anaphylaxis and Swelling     STAGE 3, ORGAN FAILURE    Wasp Venom Anaphylaxis    Zosyn [Piperacillin Sod-Tazobactam So] Rash     Widespread rash last time she was given abx    Field Mint [Mentha]     Pcn [Penicillins]     Warfarin Other (See Comments)     Per pt, Coumadin caused chest pain, muscle aches in arms, nausea, and taste disturbance.  Other reaction(s): Other: See Comments  Per pt, Coumadin caused chest pain, muscle aches in arms, nausea, and taste disturbance.  Per pt, Coumadin caused chest pain, muscle aches in arms, nausea, and taste disturbance.      Avocado Nausea And Vomiting    Ciprofloxacin Nausea And Vomiting and Other (See Comments)     Patient states \"it makes me very ill.\"    Hydrocodone-Acetaminophen Other (See Comments)     PATIENT STATES IT DOESN'T WORK    Nifedipine Rash     Rash from zosyn vs nifedipine   Rash from zosyn vs nifedipine       Oxycodone-Acetaminophen Other (See Comments)     PATIENT STATES IT DOESN'T WORK    Piperacillin-Tazobactam In Dex Rash     Reaction occurred during 8/2022 admission    Sweet Potato Nausea And Vomiting    Vancomycin Other (See Comments)     PATIENT STATES IT DOESN'T WORK       ROS:  Review of Systems   Respiratory:  Negative for cough and shortness of breath.         Negative for Hemoptysis   Cardiovascular:  Negative for chest pain.   Gastrointestinal:  Negative for abdominal pain, diarrhea, nausea and vomiting.   Endocrine: Negative for polydipsia, polyphagia and polyuria.   Genitourinary:  Negative for dysuria and hematuria.   Skin:

## 2025-03-02 ENCOUNTER — HOSPITAL ENCOUNTER (INPATIENT)
Age: 75
LOS: 3 days | Discharge: HOME | DRG: 291 | End: 2025-03-05
Attending: HOSPITALIST | Admitting: HOSPITALIST
Payer: MEDICARE

## 2025-03-02 ENCOUNTER — HOSPITAL ENCOUNTER (EMERGENCY)
Age: 75
Discharge: ANOTHER HOSPITAL | End: 2025-03-02
Attending: EMERGENCY MEDICINE
Payer: MEDICARE

## 2025-03-02 ENCOUNTER — APPOINTMENT (OUTPATIENT)
Dept: GENERAL RADIOLOGY | Age: 75
End: 2025-03-02
Payer: MEDICARE

## 2025-03-02 VITALS
SYSTOLIC BLOOD PRESSURE: 165 MMHG | BODY MASS INDEX: 37.11 KG/M2 | WEIGHT: 216.2 LBS | RESPIRATION RATE: 18 BRPM | OXYGEN SATURATION: 94 % | DIASTOLIC BLOOD PRESSURE: 86 MMHG | HEART RATE: 73 BPM | TEMPERATURE: 97.8 F

## 2025-03-02 DIAGNOSIS — N18.9 CHRONIC RENAL IMPAIRMENT, UNSPECIFIED CKD STAGE: ICD-10-CM

## 2025-03-02 DIAGNOSIS — I50.9 ACUTE CONGESTIVE HEART FAILURE, UNSPECIFIED HEART FAILURE TYPE (HCC): ICD-10-CM

## 2025-03-02 DIAGNOSIS — J96.01 ACUTE RESPIRATORY FAILURE WITH HYPOXIA (HCC): Primary | ICD-10-CM

## 2025-03-02 DIAGNOSIS — I50.33 ACUTE ON CHRONIC DIASTOLIC CONGESTIVE HEART FAILURE (HCC): Primary | ICD-10-CM

## 2025-03-02 PROBLEM — Z79.01 CHRONIC ANTICOAGULATION: Status: ACTIVE | Noted: 2024-04-08

## 2025-03-02 PROBLEM — N25.81 SECONDARY HYPERPARATHYROIDISM OF RENAL ORIGIN: Chronic | Status: ACTIVE | Noted: 2022-09-08

## 2025-03-02 PROBLEM — I82.509 LEG DVT (DEEP VENOUS THROMBOEMBOLISM), CHRONIC (HCC): Status: ACTIVE | Noted: 2017-01-18

## 2025-03-02 PROBLEM — I82.729 ARM DVT (DEEP VENOUS THROMBOEMBOLISM), CHRONIC (HCC): Status: ACTIVE | Noted: 2017-01-18

## 2025-03-02 LAB
ALBUMIN SERPL-MCNC: 3.9 G/DL (ref 3.5–5.2)
ALP SERPL-CCNC: 81 U/L (ref 35–104)
ALT SERPL-CCNC: 30 U/L (ref 0–32)
ANION GAP SERPL CALCULATED.3IONS-SCNC: 10 MMOL/L (ref 7–16)
AST SERPL-CCNC: 32 U/L (ref 0–31)
BASOPHILS # BLD: 0.06 K/UL (ref 0–0.2)
BASOPHILS NFR BLD: 1 % (ref 0–2)
BILIRUB SERPL-MCNC: 0.2 MG/DL (ref 0–1.2)
BNP SERPL-MCNC: 1734 PG/ML (ref 0–450)
BUN SERPL-MCNC: 28 MG/DL (ref 6–23)
CALCIUM SERPL-MCNC: 9.7 MG/DL (ref 8.6–10.2)
CHLORIDE SERPL-SCNC: 103 MMOL/L (ref 98–107)
CO2 SERPL-SCNC: 27 MMOL/L (ref 22–29)
CREAT SERPL-MCNC: 1.4 MG/DL (ref 0.5–1)
EOSINOPHIL # BLD: 0.25 K/UL (ref 0.05–0.5)
EOSINOPHILS RELATIVE PERCENT: 4 % (ref 0–6)
ERYTHROCYTE [DISTWIDTH] IN BLOOD BY AUTOMATED COUNT: 13.2 % (ref 11.5–15)
FLUAV RNA RESP QL NAA+PROBE: NOT DETECTED
FLUBV RNA RESP QL NAA+PROBE: NOT DETECTED
GFR, ESTIMATED: 40 ML/MIN/1.73M2
GLUCOSE SERPL-MCNC: 102 MG/DL (ref 74–99)
HCT VFR BLD AUTO: 41.2 % (ref 34–48)
HGB BLD-MCNC: 13.2 G/DL (ref 11.5–15.5)
IMM GRANULOCYTES # BLD AUTO: <0.03 K/UL (ref 0–0.58)
IMM GRANULOCYTES NFR BLD: 0 % (ref 0–5)
LACTATE BLDV-SCNC: 0.9 MMOL/L (ref 0.5–2.2)
LYMPHOCYTES NFR BLD: 1.3 K/UL (ref 1.5–4)
LYMPHOCYTES RELATIVE PERCENT: 19 % (ref 20–42)
MCH RBC QN AUTO: 29.7 PG (ref 26–35)
MCHC RBC AUTO-ENTMCNC: 32 G/DL (ref 32–34.5)
MCV RBC AUTO: 92.6 FL (ref 80–99.9)
MONOCYTES NFR BLD: 0.47 K/UL (ref 0.1–0.95)
MONOCYTES NFR BLD: 7 % (ref 2–12)
NEUTROPHILS NFR BLD: 70 % (ref 43–80)
NEUTS SEG NFR BLD: 4.79 K/UL (ref 1.8–7.3)
PLATELET # BLD AUTO: 181 K/UL (ref 130–450)
PMV BLD AUTO: 10.1 FL (ref 7–12)
POTASSIUM SERPL-SCNC: 4.4 MMOL/L (ref 3.5–5)
PROT SERPL-MCNC: 7.5 G/DL (ref 6.4–8.3)
RBC # BLD AUTO: 4.45 M/UL (ref 3.5–5.5)
RSV BY PCR: NOT DETECTED
SARS-COV-2 RNA RESP QL NAA+PROBE: NOT DETECTED
SODIUM SERPL-SCNC: 140 MMOL/L (ref 132–146)
SOURCE: NORMAL
SPECIMEN DESCRIPTION: NORMAL
SPECIMEN SOURCE: NORMAL
TROPONIN I SERPL HS-MCNC: 23 NG/L (ref 0–9)
WBC OTHER # BLD: 6.9 K/UL (ref 4.5–11.5)

## 2025-03-02 PROCEDURE — 93005 ELECTROCARDIOGRAM TRACING: CPT | Performed by: EMERGENCY MEDICINE

## 2025-03-02 PROCEDURE — 87634 RSV DNA/RNA AMP PROBE: CPT

## 2025-03-02 PROCEDURE — 83880 ASSAY OF NATRIURETIC PEPTIDE: CPT

## 2025-03-02 PROCEDURE — 84484 ASSAY OF TROPONIN QUANT: CPT

## 2025-03-02 PROCEDURE — 96374 THER/PROPH/DIAG INJ IV PUSH: CPT

## 2025-03-02 PROCEDURE — 1200000000 HC SEMI PRIVATE

## 2025-03-02 PROCEDURE — 83605 ASSAY OF LACTIC ACID: CPT

## 2025-03-02 PROCEDURE — 71045 X-RAY EXAM CHEST 1 VIEW: CPT

## 2025-03-02 PROCEDURE — 87636 SARSCOV2 & INF A&B AMP PRB: CPT

## 2025-03-02 PROCEDURE — 99285 EMERGENCY DEPT VISIT HI MDM: CPT

## 2025-03-02 PROCEDURE — 6370000000 HC RX 637 (ALT 250 FOR IP): Performed by: EMERGENCY MEDICINE

## 2025-03-02 PROCEDURE — 6360000002 HC RX W HCPCS: Performed by: EMERGENCY MEDICINE

## 2025-03-02 PROCEDURE — 85025 COMPLETE CBC W/AUTO DIFF WBC: CPT

## 2025-03-02 PROCEDURE — 2700000000 HC OXYGEN THERAPY PER DAY

## 2025-03-02 PROCEDURE — 80053 COMPREHEN METABOLIC PANEL: CPT

## 2025-03-02 PROCEDURE — 6370000000 HC RX 637 (ALT 250 FOR IP): Performed by: HOSPITALIST

## 2025-03-02 PROCEDURE — 2500000003 HC RX 250 WO HCPCS: Performed by: HOSPITALIST

## 2025-03-02 RX ORDER — HYDRALAZINE HYDROCHLORIDE 20 MG/ML
10 INJECTION INTRAMUSCULAR; INTRAVENOUS ONCE
Status: DISCONTINUED | OUTPATIENT
Start: 2025-03-02 | End: 2025-03-02 | Stop reason: HOSPADM

## 2025-03-02 RX ORDER — GABAPENTIN 300 MG/1
300 CAPSULE ORAL NIGHTLY
Status: DISCONTINUED | OUTPATIENT
Start: 2025-03-02 | End: 2025-03-02 | Stop reason: HOSPADM

## 2025-03-02 RX ORDER — SENNOSIDES A AND B 8.6 MG/1
1 TABLET, FILM COATED ORAL DAILY PRN
Status: DISCONTINUED | OUTPATIENT
Start: 2025-03-02 | End: 2025-03-05 | Stop reason: HOSPADM

## 2025-03-02 RX ORDER — IPRATROPIUM BROMIDE AND ALBUTEROL SULFATE 2.5; .5 MG/3ML; MG/3ML
1 SOLUTION RESPIRATORY (INHALATION) EVERY 4 HOURS PRN
Status: DISCONTINUED | OUTPATIENT
Start: 2025-03-02 | End: 2025-03-05 | Stop reason: HOSPADM

## 2025-03-02 RX ORDER — ONDANSETRON 4 MG/1
4 TABLET, ORALLY DISINTEGRATING ORAL EVERY 8 HOURS PRN
Status: DISCONTINUED | OUTPATIENT
Start: 2025-03-02 | End: 2025-03-02 | Stop reason: CLARIF

## 2025-03-02 RX ORDER — PROCHLORPERAZINE EDISYLATE 5 MG/ML
10 INJECTION INTRAMUSCULAR; INTRAVENOUS EVERY 6 HOURS PRN
Status: DISCONTINUED | OUTPATIENT
Start: 2025-03-02 | End: 2025-03-05 | Stop reason: HOSPADM

## 2025-03-02 RX ORDER — LANOLIN ALCOHOL/MO/W.PET/CERES
400 CREAM (GRAM) TOPICAL DAILY
Status: DISCONTINUED | OUTPATIENT
Start: 2025-03-02 | End: 2025-03-02 | Stop reason: HOSPADM

## 2025-03-02 RX ORDER — PROCHLORPERAZINE MALEATE 10 MG
5 TABLET ORAL EVERY 8 HOURS PRN
Status: DISCONTINUED | OUTPATIENT
Start: 2025-03-02 | End: 2025-03-05 | Stop reason: HOSPADM

## 2025-03-02 RX ORDER — FUROSEMIDE 10 MG/ML
40 INJECTION INTRAMUSCULAR; INTRAVENOUS ONCE
Status: COMPLETED | OUTPATIENT
Start: 2025-03-02 | End: 2025-03-02

## 2025-03-02 RX ORDER — METOPROLOL SUCCINATE 25 MG/1
25 TABLET, EXTENDED RELEASE ORAL DAILY
Status: DISCONTINUED | OUTPATIENT
Start: 2025-03-03 | End: 2025-03-03

## 2025-03-02 RX ORDER — POLYETHYLENE GLYCOL 3350 17 G/17G
17 POWDER, FOR SOLUTION ORAL DAILY PRN
Status: DISCONTINUED | OUTPATIENT
Start: 2025-03-02 | End: 2025-03-05 | Stop reason: HOSPADM

## 2025-03-02 RX ORDER — SODIUM CHLORIDE 9 MG/ML
INJECTION, SOLUTION INTRAVENOUS PRN
Status: DISCONTINUED | OUTPATIENT
Start: 2025-03-02 | End: 2025-03-05 | Stop reason: HOSPADM

## 2025-03-02 RX ORDER — METOPROLOL SUCCINATE 25 MG/1
25 TABLET, EXTENDED RELEASE ORAL DAILY
Status: DISCONTINUED | OUTPATIENT
Start: 2025-03-02 | End: 2025-03-02 | Stop reason: HOSPADM

## 2025-03-02 RX ORDER — ONDANSETRON 2 MG/ML
4 INJECTION INTRAMUSCULAR; INTRAVENOUS EVERY 6 HOURS PRN
Status: DISCONTINUED | OUTPATIENT
Start: 2025-03-02 | End: 2025-03-02 | Stop reason: CLARIF

## 2025-03-02 RX ORDER — ACETAMINOPHEN 325 MG/1
650 TABLET ORAL EVERY 6 HOURS PRN
Status: DISCONTINUED | OUTPATIENT
Start: 2025-03-02 | End: 2025-03-05 | Stop reason: HOSPADM

## 2025-03-02 RX ORDER — ACETAMINOPHEN 650 MG/1
650 SUPPOSITORY RECTAL EVERY 6 HOURS PRN
Status: DISCONTINUED | OUTPATIENT
Start: 2025-03-02 | End: 2025-03-05 | Stop reason: HOSPADM

## 2025-03-02 RX ORDER — IPRATROPIUM BROMIDE AND ALBUTEROL SULFATE 2.5; .5 MG/3ML; MG/3ML
1 SOLUTION RESPIRATORY (INHALATION) EVERY 4 HOURS PRN
Status: DISCONTINUED | OUTPATIENT
Start: 2025-03-02 | End: 2025-03-02 | Stop reason: HOSPADM

## 2025-03-02 RX ORDER — SODIUM CHLORIDE 0.9 % (FLUSH) 0.9 %
5-40 SYRINGE (ML) INJECTION PRN
Status: DISCONTINUED | OUTPATIENT
Start: 2025-03-02 | End: 2025-03-05 | Stop reason: HOSPADM

## 2025-03-02 RX ORDER — SODIUM CHLORIDE 0.9 % (FLUSH) 0.9 %
5-40 SYRINGE (ML) INJECTION EVERY 12 HOURS SCHEDULED
Status: DISCONTINUED | OUTPATIENT
Start: 2025-03-02 | End: 2025-03-05 | Stop reason: HOSPADM

## 2025-03-02 RX ADMIN — SODIUM CHLORIDE, PRESERVATIVE FREE 10 ML: 5 INJECTION INTRAVENOUS at 21:18

## 2025-03-02 RX ADMIN — FUROSEMIDE 40 MG: 10 INJECTION, SOLUTION INTRAMUSCULAR; INTRAVENOUS at 16:58

## 2025-03-02 RX ADMIN — Medication 3 MG: at 22:11

## 2025-03-02 RX ADMIN — IPRATROPIUM BROMIDE AND ALBUTEROL SULFATE 1 DOSE: 2.5; .5 SOLUTION RESPIRATORY (INHALATION) at 17:00

## 2025-03-02 RX ADMIN — METOPROLOL SUCCINATE 12.5 MG: 25 TABLET, EXTENDED RELEASE ORAL at 16:54

## 2025-03-02 RX ADMIN — Medication 400 MG: at 16:43

## 2025-03-02 ASSESSMENT — PAIN - FUNCTIONAL ASSESSMENT: PAIN_FUNCTIONAL_ASSESSMENT: NONE - DENIES PAIN

## 2025-03-02 NOTE — ED PROVIDER NOTES
[facility] EMERGENCY DEPARTMENT  EMERGENCY DEPARTMENT ENCOUNTER        Pt Name: Lula Conn  MRN: [id number]  Birthdate 1950  Date of evaluation: 3/2/2025  Provider: Belinda Mosher DO  PCP: Jaya Serrano MD  Note Started: 1:15 PM EST 3/2/25    CHIEF COMPLAINT       Chief Complaint   Patient presents with    Shortness of Breath     HAVING LOW O2 LEVELS THIS WEEK, HIGH BLOOD PRESSURES THIS WEEK.       HISTORY OF PRESENT ILLNESS: 1 or more Elements   History From: patient    Limitations to history : None    Lula Conn is a 74 y.o. female who presents with shortness of breath of worsening degrees beginning 2 weeks ago.  States she has home oxygen but usually only uses it intermittently.  The past 2 weeks she has progressively had to use it overnight and now during the day sometimes.  Her shortness of breath is worse with exertion.  She states for the past 2 weeks she just has not felt herself with mild headache and generalized malaise.  The complaint has been persistent, moderate in severity, and worsened by light exertion.  Patient denies fever/chills, sore throat, cough, congestion, chest pain,  edema, visual disturbances, focal paresthesias, focal weakness, abdominal pain, nausea, vomiting, diarrhea, constipation, dysuria, hematuria, trauma, neck or back pain, rash or other complaints.        Nursing Notes were all reviewed and agreed with or any disagreements were addressed in the HPI.    REVIEW OF SYSTEMS :           Positives and Pertinent negatives as per HPI.     SURGICAL HISTORY     Past Surgical History:   Procedure Laterality Date    AORTIC VALVE REPLACEMENT      CARDIAC SURGERY      \"homograph\" valve replacement    CATARACT EXTRACTION W/ INTRAOCULAR LENS IMPLANT Left     COLONOSCOPY      ENDOSCOPY, COLON, DIAGNOSTIC      GASTROSTOMY TUBE PLACEMENT  09/26/2016    THORACENTESIS  10/14/2016    VENA CAVA FILTER PLACEMENT Right 10/14/2016

## 2025-03-03 ENCOUNTER — APPOINTMENT (OUTPATIENT)
Age: 75
DRG: 291 | End: 2025-03-03
Attending: STUDENT IN AN ORGANIZED HEALTH CARE EDUCATION/TRAINING PROGRAM
Payer: MEDICARE

## 2025-03-03 PROBLEM — I50.33 ACUTE ON CHRONIC DIASTOLIC CONGESTIVE HEART FAILURE (HCC): Status: ACTIVE | Noted: 2025-03-02

## 2025-03-03 LAB
ANION GAP SERPL CALCULATED.3IONS-SCNC: 11 MMOL/L (ref 7–16)
BASOPHILS # BLD: 0.04 K/UL (ref 0–0.2)
BASOPHILS NFR BLD: 1 % (ref 0–2)
BUN SERPL-MCNC: 32 MG/DL (ref 6–23)
CALCIUM SERPL-MCNC: 9.3 MG/DL (ref 8.6–10.2)
CHLORIDE SERPL-SCNC: 102 MMOL/L (ref 98–107)
CO2 SERPL-SCNC: 24 MMOL/L (ref 22–29)
CREAT SERPL-MCNC: 1.6 MG/DL (ref 0.5–1)
ECHO AO ASC DIAM: 3.3 CM
ECHO AO ASCENDING AORTA INDEX: 1.66 CM/M2
ECHO AV AREA PEAK VELOCITY: 1.6 CM2
ECHO AV AREA VTI: 1.6 CM2
ECHO AV AREA/BSA PEAK VELOCITY: 0.8 CM2/M2
ECHO AV AREA/BSA VTI: 0.8 CM2/M2
ECHO AV CUSP MM: 1.8 CM
ECHO AV MEAN GRADIENT: 15 MMHG
ECHO AV MEAN VELOCITY: 1.8 M/S
ECHO AV PEAK GRADIENT: 29 MMHG
ECHO AV PEAK VELOCITY: 2.7 M/S
ECHO AV VELOCITY RATIO: 0.41
ECHO AV VTI: 63.3 CM
ECHO BSA: 2.06 M2
ECHO LA DIAMETER INDEX: 1.71 CM/M2
ECHO LA DIAMETER: 3.4 CM
ECHO LA VOL A-L A2C: 60 ML (ref 22–52)
ECHO LA VOL A-L A4C: 87 ML (ref 22–52)
ECHO LA VOL MOD A2C: 55 ML (ref 22–52)
ECHO LA VOL MOD A4C: 84 ML (ref 22–52)
ECHO LA VOLUME AREA LENGTH: 75 ML
ECHO LA VOLUME INDEX A-L A2C: 30 ML/M2 (ref 16–34)
ECHO LA VOLUME INDEX A-L A4C: 44 ML/M2 (ref 16–34)
ECHO LA VOLUME INDEX AREA LENGTH: 38 ML/M2 (ref 16–34)
ECHO LA VOLUME INDEX MOD A2C: 28 ML/M2 (ref 16–34)
ECHO LA VOLUME INDEX MOD A4C: 42 ML/M2 (ref 16–34)
ECHO LV EDV A2C: 78 ML
ECHO LV EDV A4C: 119 ML
ECHO LV EDV BP: 99 ML (ref 56–104)
ECHO LV EDV INDEX A4C: 60 ML/M2
ECHO LV EDV INDEX BP: 50 ML/M2
ECHO LV EDV NDEX A2C: 39 ML/M2
ECHO LV EF PHYSICIAN: 60 %
ECHO LV EJECTION FRACTION A2C: 47 %
ECHO LV EJECTION FRACTION A4C: 77 %
ECHO LV EJECTION FRACTION BIPLANE: 66 % (ref 55–100)
ECHO LV ESV A2C: 42 ML
ECHO LV ESV A4C: 28 ML
ECHO LV ESV BP: 33 ML (ref 19–49)
ECHO LV ESV INDEX A2C: 21 ML/M2
ECHO LV ESV INDEX A4C: 14 ML/M2
ECHO LV ESV INDEX BP: 17 ML/M2
ECHO LV FRACTIONAL SHORTENING: 38 % (ref 28–44)
ECHO LV INTERNAL DIMENSION DIASTOLE INDEX: 2.41 CM/M2
ECHO LV INTERNAL DIMENSION DIASTOLIC: 4.8 CM (ref 3.9–5.3)
ECHO LV INTERNAL DIMENSION SYSTOLIC INDEX: 1.51 CM/M2
ECHO LV INTERNAL DIMENSION SYSTOLIC: 3 CM
ECHO LV ISOVOLUMETRIC RELAXATION TIME (IVRT): 64.6 MS
ECHO LV IVSD: 1.3 CM (ref 0.6–0.9)
ECHO LV IVSS: 1.8 CM
ECHO LV MASS 2D: 288.4 G (ref 67–162)
ECHO LV MASS INDEX 2D: 144.9 G/M2 (ref 43–95)
ECHO LV POSTERIOR WALL DIASTOLIC: 1.6 CM (ref 0.6–0.9)
ECHO LV POSTERIOR WALL SYSTOLIC: 1.8 CM
ECHO LV RELATIVE WALL THICKNESS RATIO: 0.67
ECHO LVOT AREA: 3.8 CM2
ECHO LVOT AV VTI INDEX: 0.41
ECHO LVOT DIAM: 2.2 CM
ECHO LVOT MEAN GRADIENT: 3 MMHG
ECHO LVOT PEAK GRADIENT: 5 MMHG
ECHO LVOT PEAK VELOCITY: 1.1 M/S
ECHO LVOT STROKE VOLUME INDEX: 49.3 ML/M2
ECHO LVOT SV: 98 ML
ECHO LVOT VTI: 25.8 CM
ECHO MV "A" WAVE DURATION: 108.4 MSEC
ECHO MV A VELOCITY: 0.58 M/S
ECHO MV AREA PHT: 2.3 CM2
ECHO MV AREA VTI: 1.6 CM2
ECHO MV E DECELERATION TIME (DT): 317.5 MS
ECHO MV E VELOCITY: 1.53 M/S
ECHO MV E/A RATIO: 2.64
ECHO MV LVOT VTI INDEX: 2.32
ECHO MV MAX VELOCITY: 1.9 M/S
ECHO MV MEAN GRADIENT: 4 MMHG
ECHO MV MEAN VELOCITY: 0.9 M/S
ECHO MV PEAK GRADIENT: 14 MMHG
ECHO MV PRESSURE HALF TIME (PHT): 94.9 MS
ECHO MV VTI: 59.9 CM
ECHO PV MAX VELOCITY: 1 M/S
ECHO PV MEAN GRADIENT: 2 MMHG
ECHO PV MEAN VELOCITY: 0.7 M/S
ECHO PV PEAK GRADIENT: 4 MMHG
ECHO PV VTI: 19 CM
ECHO PVEIN A DURATION: 124.6 MS
ECHO PVEIN A VELOCITY: 0.2 M/S
ECHO PVEIN PEAK D VELOCITY: 0.8 M/S
ECHO PVEIN PEAK S VELOCITY: 0.4 M/S
ECHO PVEIN S/D RATIO: 0.5
ECHO RV INTERNAL DIMENSION: 3.5 CM
ECHO TV REGURGITANT MAX VELOCITY: 3.15 M/S
ECHO TV REGURGITANT PEAK GRADIENT: 40 MMHG
EKG ATRIAL RATE: 75 BPM
EKG P-R INTERVAL: 136 MS
EKG Q-T INTERVAL: 450 MS
EKG QRS DURATION: 168 MS
EKG QTC CALCULATION (BAZETT): 502 MS
EKG R AXIS: -62 DEGREES
EKG T AXIS: 54 DEGREES
EKG VENTRICULAR RATE: 75 BPM
EOSINOPHIL # BLD: 0.29 K/UL (ref 0.05–0.5)
EOSINOPHILS RELATIVE PERCENT: 5 % (ref 0–6)
ERYTHROCYTE [DISTWIDTH] IN BLOOD BY AUTOMATED COUNT: 13.2 % (ref 11.5–15)
GFR, ESTIMATED: 33 ML/MIN/1.73M2
GLUCOSE SERPL-MCNC: 114 MG/DL (ref 74–99)
HCT VFR BLD AUTO: 38.7 % (ref 34–48)
HGB BLD-MCNC: 12.2 G/DL (ref 11.5–15.5)
IMM GRANULOCYTES # BLD AUTO: 0.03 K/UL (ref 0–0.58)
IMM GRANULOCYTES NFR BLD: 1 % (ref 0–5)
LYMPHOCYTES NFR BLD: 1.15 K/UL (ref 1.5–4)
LYMPHOCYTES RELATIVE PERCENT: 18 % (ref 20–42)
MCH RBC QN AUTO: 30.3 PG (ref 26–35)
MCHC RBC AUTO-ENTMCNC: 31.5 G/DL (ref 32–34.5)
MCV RBC AUTO: 96.3 FL (ref 80–99.9)
MONOCYTES NFR BLD: 0.57 K/UL (ref 0.1–0.95)
MONOCYTES NFR BLD: 9 % (ref 2–12)
NEUTROPHILS NFR BLD: 67 % (ref 43–80)
NEUTS SEG NFR BLD: 4.25 K/UL (ref 1.8–7.3)
PLATELET # BLD AUTO: 170 K/UL (ref 130–450)
PMV BLD AUTO: 10.4 FL (ref 7–12)
POTASSIUM SERPL-SCNC: 4.8 MMOL/L (ref 3.5–5)
RBC # BLD AUTO: 4.02 M/UL (ref 3.5–5.5)
SODIUM SERPL-SCNC: 137 MMOL/L (ref 132–146)
WBC OTHER # BLD: 6.3 K/UL (ref 4.5–11.5)

## 2025-03-03 PROCEDURE — 93306 TTE W/DOPPLER COMPLETE: CPT | Performed by: INTERNAL MEDICINE

## 2025-03-03 PROCEDURE — 6370000000 HC RX 637 (ALT 250 FOR IP): Performed by: STUDENT IN AN ORGANIZED HEALTH CARE EDUCATION/TRAINING PROGRAM

## 2025-03-03 PROCEDURE — 6370000000 HC RX 637 (ALT 250 FOR IP): Performed by: HOSPITALIST

## 2025-03-03 PROCEDURE — 6360000002 HC RX W HCPCS: Performed by: STUDENT IN AN ORGANIZED HEALTH CARE EDUCATION/TRAINING PROGRAM

## 2025-03-03 PROCEDURE — APPSS180 APP SPLIT SHARED TIME > 60 MINUTES: Performed by: NURSE PRACTITIONER

## 2025-03-03 PROCEDURE — 2700000000 HC OXYGEN THERAPY PER DAY

## 2025-03-03 PROCEDURE — 85025 COMPLETE CBC W/AUTO DIFF WBC: CPT

## 2025-03-03 PROCEDURE — 6360000004 HC RX CONTRAST MEDICATION: Performed by: STUDENT IN AN ORGANIZED HEALTH CARE EDUCATION/TRAINING PROGRAM

## 2025-03-03 PROCEDURE — 1200000000 HC SEMI PRIVATE

## 2025-03-03 PROCEDURE — 99233 SBSQ HOSP IP/OBS HIGH 50: CPT | Performed by: STUDENT IN AN ORGANIZED HEALTH CARE EDUCATION/TRAINING PROGRAM

## 2025-03-03 PROCEDURE — 2500000003 HC RX 250 WO HCPCS: Performed by: HOSPITALIST

## 2025-03-03 PROCEDURE — 80048 BASIC METABOLIC PNL TOTAL CA: CPT

## 2025-03-03 PROCEDURE — C8929 TTE W OR WO FOL WCON,DOPPLER: HCPCS

## 2025-03-03 PROCEDURE — 94640 AIRWAY INHALATION TREATMENT: CPT

## 2025-03-03 PROCEDURE — 93010 ELECTROCARDIOGRAM REPORT: CPT | Performed by: INTERNAL MEDICINE

## 2025-03-03 RX ORDER — FLUTICASONE PROPIONATE 50 MCG
2 SPRAY, SUSPENSION (ML) NASAL DAILY PRN
Status: DISCONTINUED | OUTPATIENT
Start: 2025-03-03 | End: 2025-03-05 | Stop reason: HOSPADM

## 2025-03-03 RX ORDER — GABAPENTIN 300 MG/1
300 CAPSULE ORAL NIGHTLY
Status: DISCONTINUED | OUTPATIENT
Start: 2025-03-03 | End: 2025-03-03

## 2025-03-03 RX ORDER — METOPROLOL SUCCINATE 25 MG/1
12.5 TABLET, EXTENDED RELEASE ORAL DAILY
Status: DISCONTINUED | OUTPATIENT
Start: 2025-03-03 | End: 2025-03-05 | Stop reason: HOSPADM

## 2025-03-03 RX ORDER — FUROSEMIDE 10 MG/ML
40 INJECTION INTRAMUSCULAR; INTRAVENOUS 2 TIMES DAILY
Status: DISCONTINUED | OUTPATIENT
Start: 2025-03-03 | End: 2025-03-05 | Stop reason: HOSPADM

## 2025-03-03 RX ORDER — LANOLIN ALCOHOL/MO/W.PET/CERES
400 CREAM (GRAM) TOPICAL DAILY
Status: DISCONTINUED | OUTPATIENT
Start: 2025-03-03 | End: 2025-03-05 | Stop reason: HOSPADM

## 2025-03-03 RX ADMIN — MAGNESIUM OXIDE 400 MG (241.3 MG MAGNESIUM) TABLET 400 MG: TABLET at 17:04

## 2025-03-03 RX ADMIN — FUROSEMIDE 40 MG: 10 INJECTION, SOLUTION INTRAMUSCULAR; INTRAVENOUS at 17:04

## 2025-03-03 RX ADMIN — METOPROLOL SUCCINATE 12.5 MG: 25 TABLET, EXTENDED RELEASE ORAL at 09:50

## 2025-03-03 RX ADMIN — SODIUM CHLORIDE, PRESERVATIVE FREE 10 ML: 5 INJECTION INTRAVENOUS at 19:59

## 2025-03-03 RX ADMIN — IPRATROPIUM BROMIDE AND ALBUTEROL SULFATE 1 DOSE: 2.5; .5 SOLUTION RESPIRATORY (INHALATION) at 09:09

## 2025-03-03 RX ADMIN — SULFUR HEXAFLUORIDE 2 ML: 60.7; .19; .19 INJECTION, POWDER, LYOPHILIZED, FOR SUSPENSION INTRAVENOUS; INTRAVESICAL at 12:00

## 2025-03-03 RX ADMIN — ACETAMINOPHEN 650 MG: 325 TABLET ORAL at 06:30

## 2025-03-03 RX ADMIN — SODIUM CHLORIDE, PRESERVATIVE FREE 10 ML: 5 INJECTION INTRAVENOUS at 09:50

## 2025-03-03 ASSESSMENT — PAIN DESCRIPTION - LOCATION: LOCATION: HEAD

## 2025-03-03 ASSESSMENT — PAIN SCALES - GENERAL: PAINLEVEL_OUTOF10: 2

## 2025-03-03 NOTE — PLAN OF CARE
Problem: Chronic Conditions and Co-morbidities  Goal: Patient's chronic conditions and co-morbidity symptoms are monitored and maintained or improved  Outcome: Progressing  Flowsheets (Taken 3/2/2025 2307)  Care Plan - Patient's Chronic Conditions and Co-Morbidity Symptoms are Monitored and Maintained or Improved: Monitor and assess patient's chronic conditions and comorbid symptoms for stability, deterioration, or improvement     Problem: Discharge Planning  Goal: Discharge to home or other facility with appropriate resources  Outcome: Progressing  Flowsheets (Taken 3/2/2025 2307)  Discharge to home or other facility with appropriate resources: Identify barriers to discharge with patient and caregiver

## 2025-03-03 NOTE — CARE COORDINATION
Transition of Care-met with patient and her  Josse at the bedside, introduced myself and CM role in care coordination. Patient and her  reside in a one story home-3 steps to enter. Patient uses [employer]-only oxygen at night, however has needed more lately. She has been asking about portability-[employer] will not provide. She is interested in a [employer] company that can. [facility] referral made. Patient is requiring 3L. PCP is Dr. Julio C Price, preferred pharmacy is [facility]ated[facility]. CM following. Discharge plan is home with .    Jessica COLUNGA, RN  [facility]

## 2025-03-03 NOTE — H&P
[facility] Hospitalist Group History and Physical      ASSESSMENT:      Active Problems:    Acute on chronic heart failure with preserved ejection fraction (HFpEF) (HCC)    Acute hypoxemic respiratory failure (HCC)    Stage 3b chronic kidney disease (HCC)    Paroxysmal atrial flutter (HCC)    Chronic obstructive pulmonary disease, unspecified COPD type (HCC)    Essential hypertension    Type 2 diabetes mellitus with obesity (HCC)    Severe obesity (BMI 35.0-39.9) with comorbidity    History of deep vein thrombosis    S/P AVR    S/P MVR (mitral valve repair)    S/P IVC filter  Resolved Problems:    * No resolved hospital problems. *        PLAN:    Acute on Chronic HFpEF: POA, Na restriction, Strict Is & Os. Daily standing weights. TTE in 7/2022 EF 65% Severe LVH, indeterminate diastolic fxn, moderate LA dilation, repeat ordered, IV Lasix 40 mg bid. Daily BMP, sMG in a.m.  Acute Hypoxemic Respiratory Failure: POA, likely 2/2 ADHF not on home O2, initial SpO2 82% on room air, presently, pr requiring 4L NC with an SpO2 of 93%, Continuous pulse oximetry. Cont supplemental O2, titrate to an SpO2 of  90-92%.  Hypertensive Crisis: POA, Hypertensive heart disease. initial /69, Cont home antihypertensives. Start ntg patch on arrival.  COPD exacerbation: POA, scheduled duo-nebs q4hr while awake & prn. IV Solu-medrol Taper.  Tessalon Perles 100 mg q8hr prn. IV abx.  PAF: CHADS2-VASC Score = 5,  Cont home Toprol XL with holding parameters. Not on OAC (history of GI bleed).  Stage IIIa CKD: Cr 1.4 and at baseline. Avoid nephrotoxins. Renally dose medications.    Type 2 Diabetes: Last A1c 6.7% on 8/2024, hold home po meds. LDSSI & Accu-Cheks.  History upper & lower ext DVT: not on OAC. S/p IVC filter in 2016.   VHD: Bicuspid Aortic Valve. S/p SAVR and SMVR. TTE ordered  Class III Obesity:  BMI 37.11, with Type 2 Diabetes.  on diet & lifestyle modifications.   Other chronic co-morbidities were stable, continue

## 2025-03-03 NOTE — PLAN OF CARE
Patient's chart updated to reflect:      .    - HF care plan, HF education points and HF discharge instructions.  -Orders: 2 gram sodium diet, daily weights, I/O.  -PCP or cardiology follow up appointments to be scheduled within 7 days of hospital discharge.  -CHF education session will be provided to the patient prior to hospital discharge.    Shu Lynne RN   Heart Failure Navigator

## 2025-03-03 NOTE — CONSULTS
Q4H PRN, Andrei Oneill DO, 1 Dose at 03/03/25 0909    Facility-Administered Medications Ordered in Other Encounters:     morphine (PF) injection 2 mg, 2 mg, IntraVENous, Q5 Min PRN, Santiago Segal DO    ALLERGIES:  Peppermint oil, Protamine, Wasp venom, Zosyn [piperacillin sod-tazobactam so], Field mint [mentha], Pcn [penicillins], Warfarin, Avocado, Ciprofloxacin, Hydrocodone-acetaminophen, Nifedipine, Oxycodone-acetaminophen, Piperacillin-tazobactam in dex, Sweet potato, and Vancomycin    SOCIAL HISTORY:    Denies alcohol and illicit drug use.  Lifelong nonsmoker  Nocturnal O2 -- 2L NC.     FAMILY HISTORY:   Non-contributory at this time due to patient's advanced age.     REVIEW OF SYSTEMS:     Negative except as noted above in HPI.    PHYSICAL EXAM:   BP (!) 145/59   Pulse 60   Temp 97.7 °F (36.5 °C) (Oral)   Resp 16   Ht 1.626 m (5' 4\")   Wt 94.3 kg (208 lb)   SpO2 92%   BMI 35.70 kg/m²   CONST:  Well developed, well nourished elderly female who appears stated age. Awake, alert, cooperative, no apparent distress.  HEENT:   Head- Normocephalic, atraumatic.   Eyes- Conjunctivae pink, anicteric.  Neck-  No stridor, trachea midline, no apparent jugular venous distention.   CHEST: Chest symmetrical and non-tender to palpation. No accessory muscle use or intercostal retractions.  Old sternal incision - well healed.   RESPIRATORY: Lung sounds -diminished breath sounds bilaterally.  On supplemental oxygen.  CARDIOVASCULAR:     No noted carotid bruit.  Heart Ausculation- Regular rate and rhythm, + murmur.   PV: No lower extremity edema. No varicosities. Pedal pulses palpable, no clubbing or cyanosis.   ABDOMEN: Soft, obese, non-tender to light palpation. Bowel sounds present.   MS: Good muscle strength and tone. No atrophy or abnormal movements.   SKIN: Warm and dry. No statis dermatitis or ulcers.  NEURO / PSYCH: Oriented to person, place and time. Speech clear and appropriate. Follows all commands. Pleasant

## 2025-03-04 LAB
ANION GAP SERPL CALCULATED.3IONS-SCNC: 9 MMOL/L (ref 7–16)
BASOPHILS # BLD: 0.05 K/UL (ref 0–0.2)
BASOPHILS NFR BLD: 1 % (ref 0–2)
BUN SERPL-MCNC: 37 MG/DL (ref 6–23)
CALCIUM SERPL-MCNC: 9.3 MG/DL (ref 8.6–10.2)
CHLORIDE SERPL-SCNC: 102 MMOL/L (ref 98–107)
CO2 SERPL-SCNC: 27 MMOL/L (ref 22–29)
CREAT SERPL-MCNC: 1.8 MG/DL (ref 0.5–1)
EOSINOPHIL # BLD: 0.31 K/UL (ref 0.05–0.5)
EOSINOPHILS RELATIVE PERCENT: 5 % (ref 0–6)
ERYTHROCYTE [DISTWIDTH] IN BLOOD BY AUTOMATED COUNT: 13 % (ref 11.5–15)
GFR, ESTIMATED: 29 ML/MIN/1.73M2
GLUCOSE SERPL-MCNC: 106 MG/DL (ref 74–99)
HCT VFR BLD AUTO: 40.1 % (ref 34–48)
HGB BLD-MCNC: 12.3 G/DL (ref 11.5–15.5)
IMM GRANULOCYTES # BLD AUTO: <0.03 K/UL (ref 0–0.58)
IMM GRANULOCYTES NFR BLD: 0 % (ref 0–5)
LYMPHOCYTES NFR BLD: 1.46 K/UL (ref 1.5–4)
LYMPHOCYTES RELATIVE PERCENT: 24 % (ref 20–42)
MCH RBC QN AUTO: 29.2 PG (ref 26–35)
MCHC RBC AUTO-ENTMCNC: 30.7 G/DL (ref 32–34.5)
MCV RBC AUTO: 95.2 FL (ref 80–99.9)
MONOCYTES NFR BLD: 0.5 K/UL (ref 0.1–0.95)
MONOCYTES NFR BLD: 8 % (ref 2–12)
NEUTROPHILS NFR BLD: 61 % (ref 43–80)
NEUTS SEG NFR BLD: 3.65 K/UL (ref 1.8–7.3)
PLATELET # BLD AUTO: 175 K/UL (ref 130–450)
PMV BLD AUTO: 9.9 FL (ref 7–12)
POTASSIUM SERPL-SCNC: 4.6 MMOL/L (ref 3.5–5)
RBC # BLD AUTO: 4.21 M/UL (ref 3.5–5.5)
SODIUM SERPL-SCNC: 138 MMOL/L (ref 132–146)
WBC OTHER # BLD: 6 K/UL (ref 4.5–11.5)

## 2025-03-04 PROCEDURE — 6370000000 HC RX 637 (ALT 250 FOR IP): Performed by: HOSPITALIST

## 2025-03-04 PROCEDURE — 6360000002 HC RX W HCPCS: Performed by: STUDENT IN AN ORGANIZED HEALTH CARE EDUCATION/TRAINING PROGRAM

## 2025-03-04 PROCEDURE — 80048 BASIC METABOLIC PNL TOTAL CA: CPT

## 2025-03-04 PROCEDURE — 94640 AIRWAY INHALATION TREATMENT: CPT

## 2025-03-04 PROCEDURE — 85025 COMPLETE CBC W/AUTO DIFF WBC: CPT

## 2025-03-04 PROCEDURE — 2500000003 HC RX 250 WO HCPCS: Performed by: HOSPITALIST

## 2025-03-04 PROCEDURE — 6370000000 HC RX 637 (ALT 250 FOR IP): Performed by: STUDENT IN AN ORGANIZED HEALTH CARE EDUCATION/TRAINING PROGRAM

## 2025-03-04 PROCEDURE — 1200000000 HC SEMI PRIVATE

## 2025-03-04 PROCEDURE — 99232 SBSQ HOSP IP/OBS MODERATE 35: CPT | Performed by: STUDENT IN AN ORGANIZED HEALTH CARE EDUCATION/TRAINING PROGRAM

## 2025-03-04 PROCEDURE — 2700000000 HC OXYGEN THERAPY PER DAY

## 2025-03-04 RX ORDER — AMLODIPINE BESYLATE 5 MG/1
5 TABLET ORAL NIGHTLY
Status: DISCONTINUED | OUTPATIENT
Start: 2025-03-04 | End: 2025-03-05 | Stop reason: HOSPADM

## 2025-03-04 RX ADMIN — AMLODIPINE BESYLATE 5 MG: 5 TABLET ORAL at 20:02

## 2025-03-04 RX ADMIN — SODIUM CHLORIDE, PRESERVATIVE FREE 10 ML: 5 INJECTION INTRAVENOUS at 09:03

## 2025-03-04 RX ADMIN — SODIUM CHLORIDE, PRESERVATIVE FREE 10 ML: 5 INJECTION INTRAVENOUS at 20:02

## 2025-03-04 RX ADMIN — MAGNESIUM OXIDE 400 MG (241.3 MG MAGNESIUM) TABLET 400 MG: TABLET at 09:03

## 2025-03-04 RX ADMIN — IPRATROPIUM BROMIDE AND ALBUTEROL SULFATE 1 DOSE: 2.5; .5 SOLUTION RESPIRATORY (INHALATION) at 17:54

## 2025-03-04 RX ADMIN — FUROSEMIDE 40 MG: 10 INJECTION, SOLUTION INTRAMUSCULAR; INTRAVENOUS at 09:03

## 2025-03-04 RX ADMIN — METOPROLOL SUCCINATE 12.5 MG: 25 TABLET, EXTENDED RELEASE ORAL at 09:03

## 2025-03-04 RX ADMIN — Medication 3 MG: at 20:02

## 2025-03-04 NOTE — CARE COORDINATION
Transition of Care-patient remains on 3L of oxygen (92%SpO2), IV Lasix BID. [facility] following. Patient will need new ambulatory pulse ox testing and DME order if she requires oxygen at discharge. Isabela to provide. Discharge plan is home with -no further needs.    Jessica BARTONN, RN  [facility]

## 2025-03-04 NOTE — PLAN OF CARE
Problem: Safety - Adult  Goal: Free from fall injury  3/4/2025 1154 by Tamara Murray, RN  Outcome: Progressing  3/3/2025 2301 by Luba Hung, RN  Outcome: Progressing

## 2025-03-05 VITALS
SYSTOLIC BLOOD PRESSURE: 137 MMHG | OXYGEN SATURATION: 94 % | RESPIRATION RATE: 16 BRPM | HEIGHT: 64 IN | BODY MASS INDEX: 36.7 KG/M2 | TEMPERATURE: 97.7 F | HEART RATE: 98 BPM | WEIGHT: 215 LBS | DIASTOLIC BLOOD PRESSURE: 62 MMHG

## 2025-03-05 LAB
ANION GAP SERPL CALCULATED.3IONS-SCNC: 11 MMOL/L (ref 7–16)
BASOPHILS # BLD: 0.04 K/UL (ref 0–0.2)
BASOPHILS NFR BLD: 1 % (ref 0–2)
BUN SERPL-MCNC: 47 MG/DL (ref 6–23)
CALCIUM SERPL-MCNC: 9.3 MG/DL (ref 8.6–10.2)
CHLORIDE SERPL-SCNC: 101 MMOL/L (ref 98–107)
CO2 SERPL-SCNC: 26 MMOL/L (ref 22–29)
CREAT SERPL-MCNC: 1.7 MG/DL (ref 0.5–1)
EOSINOPHIL # BLD: 0.27 K/UL (ref 0.05–0.5)
EOSINOPHILS RELATIVE PERCENT: 4 % (ref 0–6)
ERYTHROCYTE [DISTWIDTH] IN BLOOD BY AUTOMATED COUNT: 13.1 % (ref 11.5–15)
GFR, ESTIMATED: 31 ML/MIN/1.73M2
GLUCOSE SERPL-MCNC: 115 MG/DL (ref 74–99)
HCT VFR BLD AUTO: 41.7 % (ref 34–48)
HGB BLD-MCNC: 13 G/DL (ref 11.5–15.5)
IMM GRANULOCYTES # BLD AUTO: <0.03 K/UL (ref 0–0.58)
IMM GRANULOCYTES NFR BLD: 0 % (ref 0–5)
LYMPHOCYTES NFR BLD: 1.45 K/UL (ref 1.5–4)
LYMPHOCYTES RELATIVE PERCENT: 23 % (ref 20–42)
MCH RBC QN AUTO: 29.5 PG (ref 26–35)
MCHC RBC AUTO-ENTMCNC: 31.2 G/DL (ref 32–34.5)
MCV RBC AUTO: 94.6 FL (ref 80–99.9)
MONOCYTES NFR BLD: 0.65 K/UL (ref 0.1–0.95)
MONOCYTES NFR BLD: 10 % (ref 2–12)
NEUTROPHILS NFR BLD: 62 % (ref 43–80)
NEUTS SEG NFR BLD: 3.94 K/UL (ref 1.8–7.3)
PLATELET # BLD AUTO: 185 K/UL (ref 130–450)
PMV BLD AUTO: 10.3 FL (ref 7–12)
POTASSIUM SERPL-SCNC: 4.5 MMOL/L (ref 3.5–5)
RBC # BLD AUTO: 4.41 M/UL (ref 3.5–5.5)
SODIUM SERPL-SCNC: 138 MMOL/L (ref 132–146)
WBC OTHER # BLD: 6.4 K/UL (ref 4.5–11.5)

## 2025-03-05 PROCEDURE — 2500000003 HC RX 250 WO HCPCS: Performed by: HOSPITALIST

## 2025-03-05 PROCEDURE — 6370000000 HC RX 637 (ALT 250 FOR IP): Performed by: STUDENT IN AN ORGANIZED HEALTH CARE EDUCATION/TRAINING PROGRAM

## 2025-03-05 PROCEDURE — 80048 BASIC METABOLIC PNL TOTAL CA: CPT

## 2025-03-05 PROCEDURE — 85025 COMPLETE CBC W/AUTO DIFF WBC: CPT

## 2025-03-05 PROCEDURE — 2700000000 HC OXYGEN THERAPY PER DAY

## 2025-03-05 PROCEDURE — 99239 HOSP IP/OBS DSCHRG MGMT >30: CPT | Performed by: STUDENT IN AN ORGANIZED HEALTH CARE EDUCATION/TRAINING PROGRAM

## 2025-03-05 PROCEDURE — 36415 COLL VENOUS BLD VENIPUNCTURE: CPT

## 2025-03-05 RX ADMIN — SODIUM CHLORIDE, PRESERVATIVE FREE 10 ML: 5 INJECTION INTRAVENOUS at 08:07

## 2025-03-05 RX ADMIN — MAGNESIUM OXIDE 400 MG (241.3 MG MAGNESIUM) TABLET 400 MG: TABLET at 08:07

## 2025-03-05 NOTE — CARE COORDINATION
Transition of Care-IV Lasix on hold. Worsening kidney function. [facility] following, medication adjustments made. Anticipate discharge soon-next 12-24 hrs. Patient DID NOT qualify for home oxygen.  Discharge plan is return home with , no anticipated needs at discharge.    Jessica COLUNGA, RN  [facility]

## 2025-03-05 NOTE — PROGRESS NOTES
[facility] Hospitalist Progress Note    Admitting Date and Time: 3/2/2025  8:33 PM  Admit Dx: Acute exacerbation of chronic heart failure (HCC) [I50.9]    Subjective:  Patient is being followed for Acute exacerbation of chronic heart failure (HCC) [I50.9]     Patient continues to have fatigue and shortness of breath, especially with exertion. On 3L NC continuously, occasionally uses 2L at night only. She had an episode of sharp left sided chest pain last night which spontaneously resolved. Patient did not notify anyone at that time. No recurrent chest pain.     ROS: Pertinent findings stated above. Denies dizziness, fever, chills,  palpitations, cough, abdominal pain, nausea, vomiting, dysuria, hematuria, melena, hematochezia, diarrhea, or constipation     metoprolol succinate  12.5 mg Oral Daily    sodium chloride flush  5-40 mL IntraVENous 2 times per day     sodium chloride flush, 5-40 mL, PRN  sodium chloride, , PRN  melatonin, 3 mg, Nightly PRN  polyethylene glycol, 17 g, Daily PRN  senna, 1 tablet, Daily PRN  acetaminophen, 650 mg, Q6H PRN   Or  acetaminophen, 650 mg, Q6H PRN  prochlorperazine, 5 mg, Q8H PRN   Or  prochlorperazine, 10 mg, Q6H PRN  ipratropium 0.5 mg-albuterol 2.5 mg, 1 Dose, Q4H PRN         Objective:    BP (!) 145/59   Pulse 60   Temp 97.7 °F (36.5 °C) (Oral)   Resp 16   Ht 1.626 m (5' 4\")   Wt 94.3 kg (208 lb)   SpO2 92%   BMI 35.70 kg/m²     General Appearance: No acute distress. Alert and oriented to person, place and time   HEENT: normocephalic and atraumatic, extraocular movement intact, conjunctiva clear, oral mucosa moist  Pulmonary/Chest: on 3L NC, bibasilar crackles, no wheezing or rhonchi. Normal air movement, no respiratory distress  Cardiovascular: Grade 2/6 systolic murmur, normal rate, normal S1 and S2 and no carotid bruits  Abdomen: soft, non-tender, non-distended, normal bowel sounds, no masses or organomegaly. No rebound tenderness or guarding   Extremities: 
    INPATIENT CARDIOLOGY FOLLOW-UP    Name: Lula Conn    Age: 74 y.o.    Date of Admission: 3/2/2025  8:33 PM    Date of Service: 3/4/2025    Chief Complaint: Follow-up for CHF, PAF, VHD    Interim History:  No new overnight cardiac complaints. Currently with no complaints of CP, SOB, palpitations, dizziness, or lightheadedness at rest. SR on telemetry.    Review of Systems:   Cardiac: As per HPI  General: No fever, chills  Pulmonary: As per HPI  HEENT: No visual disturbances, difficult swallowing  GI: No nausea, vomiting  : No dysuria, hematuria  Endocrine: No thyroid disease, +DM  Musculoskeletal: TRAN x 4, no focal motor deficits  Skin: Intact, no rashes  Neuro: No headache, seizures  Psych: Currently with no depression, anxiety    Problem List:  Patient Active Problem List   Diagnosis    S/P AVR    S/P MVR (mitral valve repair)    Hypervolemia    Essential hypertension    Stage 3b chronic kidney disease ([facility])    Type 2 diabetes mellitus with obesity ([facility])    Encounter to establish care    Severe obesity (BMI 35.0-39.9) with comorbidity    Pneumonia due to infectious organism    Diverticulitis of colon    Abdominal pain    A-fib ([facility])    Atrial fibrillation with rapid ventricular response ([facility])    Paroxysmal atrial flutter ([facility])    Acute kidney injury superimposed on chronic kidney disease    Chronic obstructive pulmonary disease, unspecified COPD type ([facility])    Anaphylactic reaction due to adverse effect of correct drug or medicament properly administered, initial encounter    Anxiety    Arm DVT (deep venous thromboembolism), chronic (HCC)    Bacteremia    Bicuspid aortic valve    Chronic anticoagulation    Gastrointestinal hemorrhage with melena    Leg DVT (deep venous thromboembolism), chronic (HCC)    Secondary hyperparathyroidism of renal origin    Personal history of ECMO    History of deep vein thrombosis    Acute on chronic heart failure with preserved ejection fraction (HFpEF) (HCC)    Acute 
    INPATIENT CARDIOLOGY FOLLOW-UP    Name: Lula Conn    Age: 74 y.o.    Date of Admission: 3/2/2025  8:33 PM    Date of Service: 3/5/2025    Chief Complaint: Follow-up for CHF, PAF, VHD    Interim History:  No new overnight cardiac complaints. Currently with no complaints of CP, SOB, palpitations, dizziness, or lightheadedness at rest. SR on telemetry. Reported I/O's net negative 3.5 L. Most recent 's.    Review of Systems:   Cardiac: As per HPI  General: No fever, chills  Pulmonary: As per HPI  HEENT: No visual disturbances, difficult swallowing  GI: No nausea, vomiting  : No dysuria, hematuria  Endocrine: No thyroid disease, +DM  Musculoskeletal: TRAN x 4, no focal motor deficits  Skin: Intact, no rashes  Neuro: No headache, seizures  Psych: Currently with no depression, anxiety    Problem List:  Patient Active Problem List   Diagnosis    S/P AVR    S/P MVR (mitral valve repair)    Hypervolemia    Essential hypertension    Stage 3b chronic kidney disease (HCC)    Type 2 diabetes mellitus with obesity (HCC)    Encounter to establish care    Severe obesity (BMI 35.0-39.9) with comorbidity    Pneumonia due to infectious organism    Diverticulitis of colon    Abdominal pain    A-fib (HCC)    Atrial fibrillation with rapid ventricular response (HCC)    Paroxysmal atrial flutter (HCC)    Acute kidney injury superimposed on chronic kidney disease    Chronic obstructive pulmonary disease, unspecified COPD type (HCC)    Anaphylactic reaction due to adverse effect of correct drug or medicament properly administered, initial encounter    Anxiety    Arm DVT (deep venous thromboembolism), chronic (HCC)    Bacteremia    Bicuspid aortic valve    Chronic anticoagulation    Gastrointestinal hemorrhage with melena    Leg DVT (deep venous thromboembolism), chronic (HCC)    Secondary hyperparathyroidism of renal origin    Personal history of ECMO    History of deep vein thrombosis    Acute on chronic heart failure 
Ignacios[facility] Dr. Bloom.  Dr. Mckenzie on call.  Sent text advising patient had run of accelerated junctional.  
Ignac[facility] Dr. Chappell.  Dr. Oneill on call.  Advised patient has Neurontin ordered this evening but patient states she does not take this even though it is on her home med list.  Patient refused pill.  
Occupational Therapy  OT eval and treat order received . Attempted eval and pt is up ambulating independently in the room and reports completing her self care independently. Pt does not require any OT services at this time. Will discharge OT eval and treat order.Kavya Gaitan OTR/L [id number]   
Order received from Dr. Oneill to discontinue Neurontin.  
Physical Therapy  [address] [facility]      Name: Lula Conn  : 1950  MRN: [id number]  Date of Service: 3/3/2025    PT eval was attempted this am. After speaking with pt she reports her breathing is much better and she is Independent with functional mobility and has no issues with her balance.  A quick screen was performed and pt was Independent with getting OOB, standing, and walking in the room with no AD.  She had no SOB or LOB moving around in the room and is Independent at this time with no skilled PT needs and will be discharged from our service.    Erick Canales Jr., P.T.  License Number: [id number]    
Pulse OX was 94% on room air at rest.   Ambulated patient on room air. Oxygen saturation was 89%.   Patient took at seat and recovered to 92% on room air.   
COPD  Patient on 3L NC continuously, baseline occasional use of 2L at night only  Secondary to pulmonary edema from CHF   Titrate O2 down as tolerated, maintain Sp02> 92%  No exacerbation of COPD, duonebs prn  - Hypertension  - Hypertensive crisis  BP on admission 200/69  BP continues to be elevated  Continue Toprol XL, will add Norvasc  5mg qHS  (note allergy to nifedipine vs zosyn- unclear which medication; allergy is rash- will monitor after administration of norvasc)   - HX of bicuspid AV s/p graft in 1999, revision 2016  - Hx of MV repair 2016  - CAD s/p CABG x 2  - Paroxysmal A. Fib/ A. Flutter   Diagnosed in 2016, multiple DCCV. No anticoagulation due to allergy (warfarin) and cost of DOAC  Following with [facility], considering [facility]   - Hx of DVT  S/p IVC filter 2016  - CKD    Creatinine at 1.8, hold Lasix  Repeat tomorrow   - Type 2 Diabetes Mellitus  Blood glucose checks ACHS, sliding scale insulin for coverage   - Hyperlipidemia  Continue Statin         -DVT prophylaxis with SCDs  -All labs and imaging reviewed, appropriate morning labs ordered   NOTE: This report was transcribed using voice recognition software. Every effort was made to ensure accuracy; however, inadvertent computerized transcription errors may be present.  Electronically signed by Trang Chappell MD on 3/4/2025 at 3:41 PM

## 2025-03-05 NOTE — PLAN OF CARE
Problem: Chronic Conditions and Co-morbidities  Goal: Patient's chronic conditions and co-morbidity symptoms are monitored and maintained or improved  Outcome: Progressing     Problem: Discharge Planning  Goal: Discharge to home or other facility with appropriate resources  Outcome: Progressing     Problem: Safety - Adult  Goal: Free from fall injury  3/5/2025 1025 by Roxanna Chaney, RN  Outcome: Progressing  3/4/2025 2313 by Luba Hung, RN  Outcome: Progressing

## 2025-03-06 ENCOUNTER — CARE COORDINATION (OUTPATIENT)
Dept: CARE COORDINATION | Age: 75
End: 2025-03-06

## 2025-03-06 DIAGNOSIS — I50.33 ACUTE ON CHRONIC HEART FAILURE WITH PRESERVED EJECTION FRACTION (HFPEF) (HCC): Primary | Chronic | ICD-10-CM

## 2025-03-06 PROCEDURE — 1111F DSCHRG MED/CURRENT MED MERGE: CPT | Performed by: INTERNAL MEDICINE

## 2025-03-06 NOTE — CARE COORDINATION
Care Transitions Note    Initial Call - Call within 2 business days of discharge: Yes    Patient Current Location:  Home: [address] [place] [address]    Care Transition Nurse contacted the patient by telephone to perform post hospital discharge assessment, verified name and  as identifiers. Provided introduction to self, and explanation of the Care Transition Nurse role.     Patient: Lula Conn    Patient : 1950   MRN: [id number]    Reason for Admission: Acute on chronic HF with preserved ejection fraction  Discharge Date: 3/5/25  RURS: Readmission Risk Score: 10.4      Last Discharge Facility       Date Complaint Diagnosis Description Type Department Provider    3/2/25  Acute on chronic diastolic congestive heart failure (HCC) Admission (Discharged) Trang Pavon MD    3/2/25 Shortness of Breath Acute respiratory failure with hypoxia (HCC) ... ED (TRANSFER) [facility] Belinda Mosher, DO            Was this an external facility discharge? No    Additional needs identified to be addressed with provider   No needs identified             Method of communication with provider: none.    Patients top risk factors for readmission: lack of knowledge about disease and medical condition-HTN, DM, CHF, COPD    Interventions to address risk factors:   Education: Discussed CHF zone tool and knowing when to seek medical attention.    Care Summary Note: Spoke with patient today 3/6/25 for initial PHANI/hospital discharge (low risk) follow up. Patient states she continues with shortness of breath with exertion. Patient verbalizes her displeasure over not qualifying for home oxygen on d/c. Denies any chest pain, chest discomfort or LE swelling.     Patient admits to this CTN that she did stop taking her Lasix for a couple days as she is active and attends antique shows. States she sometimes changes taking Lasix at night instead of in the morning if she is going to a show so not to be

## 2025-03-08 NOTE — DISCHARGE SUMMARY
[facility] Hospitalist Physician Discharge Summary       No follow-up provider specified.    Activity level: As tolerated     Dispo: Home    Condition on discharge: Stable     Patient ID:  Lula Conn  [id number]  74 y.o.  1950    Admit date: 3/2/2025    Discharge date and time:  3/7/2025  9:37 PM    Admission Diagnoses: Principal Problem:    Acute on chronic heart failure with preserved ejection fraction (HFpEF) (HCC)  Active Problems:    Stage 3b chronic kidney disease (HCC)    Type 2 diabetes mellitus with obesity (HCC)    Severe obesity (BMI 35.0-39.9) with comorbidity    S/P AVR    S/P MVR (mitral valve repair)    Essential hypertension    Paroxysmal atrial flutter (HCC)    Chronic obstructive pulmonary disease, unspecified COPD type (HCC)    History of deep vein thrombosis    Acute hypoxic respiratory failure (HCC)    S/P IVC filter    Acute on chronic diastolic congestive heart failure (HCC)  Resolved Problems:    * No resolved hospital problems. *      Discharge Diagnoses: Principal Problem:    Acute on chronic heart failure with preserved ejection fraction (HFpEF) (HCC)  Active Problems:    Stage 3b chronic kidney disease (HCC)    Type 2 diabetes mellitus with obesity (HCC)    Severe obesity (BMI 35.0-39.9) with comorbidity    S/P AVR    S/P MVR (mitral valve repair)    Essential hypertension    Paroxysmal atrial flutter (HCC)    Chronic obstructive pulmonary disease, unspecified COPD type (HCC)    History of deep vein thrombosis    Acute hypoxic respiratory failure (HCC)    S/P IVC filter    Acute on chronic diastolic congestive heart failure (HCC)  Resolved Problems:    * No resolved hospital problems. *      Consults:  IP CONSULT TO HEART FAILURE NURSE/COORDINATOR  IP CONSULT TO CARDIOLOGY    Hospital Course:   Patient Lula Conn is a 74 y.o. presented with Acute exacerbation of chronic heart failure (HCC) [I50.9]    Patient admitted to telemetry with acute hypoxic

## 2025-03-11 ENCOUNTER — OFFICE VISIT (OUTPATIENT)
Dept: PRIMARY CARE CLINIC | Age: 75
End: 2025-03-11
Payer: MEDICARE

## 2025-03-11 VITALS
DIASTOLIC BLOOD PRESSURE: 88 MMHG | HEIGHT: 64 IN | WEIGHT: 205 LBS | BODY MASS INDEX: 35 KG/M2 | SYSTOLIC BLOOD PRESSURE: 133 MMHG | OXYGEN SATURATION: 94 % | TEMPERATURE: 97.5 F | RESPIRATION RATE: 17 BRPM | HEART RATE: 61 BPM

## 2025-03-11 DIAGNOSIS — Z98.890 S/P MVR (MITRAL VALVE REPAIR): ICD-10-CM

## 2025-03-11 DIAGNOSIS — Z95.2 S/P AVR: ICD-10-CM

## 2025-03-11 DIAGNOSIS — N18.32 STAGE 3B CHRONIC KIDNEY DISEASE (HCC): ICD-10-CM

## 2025-03-11 DIAGNOSIS — J44.9 CHRONIC OBSTRUCTIVE PULMONARY DISEASE, UNSPECIFIED COPD TYPE (HCC): ICD-10-CM

## 2025-03-11 DIAGNOSIS — I50.31 DIASTOLIC CHF, ACUTE (HCC): ICD-10-CM

## 2025-03-11 DIAGNOSIS — I10 ESSENTIAL HYPERTENSION: Primary | ICD-10-CM

## 2025-03-11 PROCEDURE — 99214 OFFICE O/P EST MOD 30 MIN: CPT | Performed by: INTERNAL MEDICINE

## 2025-03-11 PROCEDURE — 3079F DIAST BP 80-89 MM HG: CPT | Performed by: INTERNAL MEDICINE

## 2025-03-11 PROCEDURE — 3075F SYST BP GE 130 - 139MM HG: CPT | Performed by: INTERNAL MEDICINE

## 2025-03-11 PROCEDURE — G8400 PT W/DXA NO RESULTS DOC: HCPCS | Performed by: INTERNAL MEDICINE

## 2025-03-11 PROCEDURE — G8417 CALC BMI ABV UP PARAM F/U: HCPCS | Performed by: INTERNAL MEDICINE

## 2025-03-11 PROCEDURE — 1159F MED LIST DOCD IN RCRD: CPT | Performed by: INTERNAL MEDICINE

## 2025-03-11 PROCEDURE — 1036F TOBACCO NON-USER: CPT | Performed by: INTERNAL MEDICINE

## 2025-03-11 PROCEDURE — 3023F SPIROM DOC REV: CPT | Performed by: INTERNAL MEDICINE

## 2025-03-11 PROCEDURE — 3017F COLORECTAL CA SCREEN DOC REV: CPT | Performed by: INTERNAL MEDICINE

## 2025-03-11 PROCEDURE — 1111F DSCHRG MED/CURRENT MED MERGE: CPT | Performed by: INTERNAL MEDICINE

## 2025-03-11 PROCEDURE — 1090F PRES/ABSN URINE INCON ASSESS: CPT | Performed by: INTERNAL MEDICINE

## 2025-03-11 PROCEDURE — 1123F ACP DISCUSS/DSCN MKR DOCD: CPT | Performed by: INTERNAL MEDICINE

## 2025-03-11 PROCEDURE — G8427 DOCREV CUR MEDS BY ELIG CLIN: HCPCS | Performed by: INTERNAL MEDICINE

## 2025-03-11 RX ORDER — METOPROLOL SUCCINATE 25 MG/1
25 TABLET, EXTENDED RELEASE ORAL DAILY
COMMUNITY

## 2025-03-11 RX ORDER — ALBUTEROL SULFATE 90 UG/1
2 INHALANT RESPIRATORY (INHALATION) EVERY 6 HOURS PRN
COMMUNITY

## 2025-03-11 ASSESSMENT — PATIENT HEALTH QUESTIONNAIRE - PHQ9
SUM OF ALL RESPONSES TO PHQ QUESTIONS 1-9: 0
2. FEELING DOWN, DEPRESSED OR HOPELESS: NOT AT ALL
1. LITTLE INTEREST OR PLEASURE IN DOING THINGS: NOT AT ALL

## 2025-03-11 NOTE — PROGRESS NOTES
SUBJECTIVE  Lula Conn is a 74 y.o. female established was seen In the office  for evaluation.    HPI/Chief C/O:  Chief Complaint   Patient presents with    Follow-Up from Hospital     Following up from the hospital    Was in hospital with respiratory failure ,CHF , doing fine now   Allergies   Allergen Reactions    Peppermint Oil Shortness Of Breath     Chest pain and throat swelling  Other reaction(s): Other: See Comments  Chest pain and throat swelling      Protamine Anaphylaxis and Swelling     STAGE 3, ORGAN FAILURE    Wasp Venom Anaphylaxis    Zosyn [Piperacillin Sod-Tazobactam So] Rash     Widespread rash last time she was given abx    Field Mint [Mentha]     Pcn [Penicillins]     Warfarin Other (See Comments)     Per pt, Coumadin caused chest pain, muscle aches in arms, nausea, and taste disturbance.  Other reaction(s): Other: See Comments  Per pt, Coumadin caused chest pain, muscle aches in arms, nausea, and taste disturbance.  Per pt, Coumadin caused chest pain, muscle aches in arms, nausea, and taste disturbance.      Avocado Nausea And Vomiting    Ciprofloxacin Nausea And Vomiting and Other (See Comments)     Patient states \"it makes me very ill.\"    Hydrocodone-Acetaminophen Other (See Comments)     PATIENT STATES IT DOESN'T WORK    Nifedipine Rash     Rash from zosyn vs nifedipine   Rash from zosyn vs nifedipine       Oxycodone-Acetaminophen Other (See Comments)     PATIENT STATES IT DOESN'T WORK    Piperacillin-Tazobactam In Dex Rash     Reaction occurred during 8/2022 admission    Sweet Potato Nausea And Vomiting    Vancomycin Other (See Comments)     PATIENT STATES IT DOESN'T WORK       ROS:  Review of Systems     Past Medical/Surgical Hx;  Reviewed with patient      Diagnosis Date    A-fib (HCC)     ARF (acute renal failure)     Cancer (HCC)     lymphoma    Cardiac dysfunction     Chronic bronchitis (HCC)     Diabetes mellitus (HCC)     Diverticulitis     History of deep vein thrombosis

## 2025-03-13 ENCOUNTER — CARE COORDINATION (OUTPATIENT)
Dept: CARE COORDINATION | Age: 75
End: 2025-03-13

## 2025-03-13 NOTE — CARE COORDINATION
Care Transitions Note    Follow Up Call     Attempted to reach patient for transitions of care follow up.  Unable to reach patient.      Outreach Attempts:   HIPAA compliant voicemail left for patient.     Care Summary Note: Attempted to contact patient today 3/13/25 for PHANI/hospital discharge (low risk) follow up sub call for Acute on chronic HF with preserved ejection fraction. Left message on home/mobile number requesting a return call back to CTN and provided contact information. Noted patient completed HFU appt earlier this week with Dr. Summers (PCP) on 3/11/25. CTN will continue to follow.     Follow Up Appointment:   Future Appointments         Provider Specialty Dept Phone    4/8/2025 8:45 AM Jaya Serrano MD Primary Care [phone]    10/17/2025 11:00 AM Jaya Serrano MD Primary Care [phone]            Plan for follow-up call in 6-10 days based on severity of symptoms and risk factors.     Karey Madrid APRN

## 2025-03-20 ENCOUNTER — CARE COORDINATION (OUTPATIENT)
Dept: CARE COORDINATION | Age: 75
End: 2025-03-20

## 2025-03-20 NOTE — CARE COORDINATION
symptoms?: Yes  Onset of Patient-reported symptoms: Other  Patient-reported symptoms: Shortness of Breath, Other  Have your medications changed?: No  Do you have any questions related to your medications?: No  Do you have any needs or concerns that I can assist you with?: No  Identified Barriers: Lack of Motivation, Lack of Education, Other  Care Transitions Interventions     Other Services: Declined (Comment: 3/20/25 Declines RPM)     Registered Dietician: Declined    Disease Specific Clinic: Declined      Other Interventions:              Upcoming Appointments:    Future Appointments         Provider Specialty Dept Phone    4/8/2025 8:45 AM Jaya Serrano MD Primary Care [phone]    10/17/2025 11:00 AM Jaya Serrano MD Primary Care [phone]            Patient has agreed to contact primary care provider and/or specialist for any further questions, concerns, or needs.    Karey Madrid APRN

## 2025-04-08 ENCOUNTER — OFFICE VISIT (OUTPATIENT)
Dept: PRIMARY CARE CLINIC | Age: 75
End: 2025-04-08
Payer: MEDICARE

## 2025-04-08 ENCOUNTER — TELEPHONE (OUTPATIENT)
Dept: PRIMARY CARE CLINIC | Age: 75
End: 2025-04-08

## 2025-04-08 VITALS
BODY MASS INDEX: 35.17 KG/M2 | WEIGHT: 206 LBS | TEMPERATURE: 98.6 F | HEART RATE: 74 BPM | RESPIRATION RATE: 16 BRPM | OXYGEN SATURATION: 97 % | DIASTOLIC BLOOD PRESSURE: 70 MMHG | HEIGHT: 64 IN | SYSTOLIC BLOOD PRESSURE: 120 MMHG

## 2025-04-08 DIAGNOSIS — J44.9 CHRONIC OBSTRUCTIVE PULMONARY DISEASE, UNSPECIFIED COPD TYPE (HCC): ICD-10-CM

## 2025-04-08 DIAGNOSIS — Z98.890 S/P MVR (MITRAL VALVE REPAIR): ICD-10-CM

## 2025-04-08 DIAGNOSIS — N18.32 STAGE 3B CHRONIC KIDNEY DISEASE (HCC): ICD-10-CM

## 2025-04-08 DIAGNOSIS — E78.2 MIXED HYPERLIPIDEMIA: ICD-10-CM

## 2025-04-08 DIAGNOSIS — I10 ESSENTIAL HYPERTENSION: Primary | ICD-10-CM

## 2025-04-08 LAB
ALBUMIN: 4.1 G/DL (ref 3.5–5.2)
ALP BLD-CCNC: 61 U/L (ref 35–104)
ALT SERPL-CCNC: 19 U/L (ref 0–32)
ANION GAP SERPL CALCULATED.3IONS-SCNC: 14 MMOL/L (ref 7–16)
AST SERPL-CCNC: 23 U/L (ref 0–31)
BILIRUB SERPL-MCNC: 0.4 MG/DL (ref 0–1.2)
BUN BLDV-MCNC: 32 MG/DL (ref 6–23)
CALCIUM SERPL-MCNC: 9.5 MG/DL (ref 8.6–10.2)
CHLORIDE BLD-SCNC: 103 MMOL/L (ref 98–107)
CHOLESTEROL, TOTAL: 232 MG/DL
CO2: 25 MMOL/L (ref 22–29)
CREAT SERPL-MCNC: 1.7 MG/DL (ref 0.5–1)
GFR, ESTIMATED: 32 ML/MIN/1.73M2
GLUCOSE BLD-MCNC: 97 MG/DL (ref 74–99)
HCT VFR BLD CALC: 43.6 % (ref 34–48)
HDLC SERPL-MCNC: 36 MG/DL
HEMOGLOBIN: 13.6 G/DL (ref 11.5–15.5)
LDL CHOLESTEROL: 164 MG/DL
MCH RBC QN AUTO: 29.7 PG (ref 26–35)
MCHC RBC AUTO-ENTMCNC: 31.2 G/DL (ref 32–34.5)
MCV RBC AUTO: 95.2 FL (ref 80–99.9)
PDW BLD-RTO: 14.2 % (ref 11.5–15)
PLATELET # BLD: 168 K/UL (ref 130–450)
PMV BLD AUTO: 10.8 FL (ref 7–12)
POTASSIUM SERPL-SCNC: 4.4 MMOL/L (ref 3.5–5)
RBC # BLD: 4.58 M/UL (ref 3.5–5.5)
SODIUM BLD-SCNC: 142 MMOL/L (ref 132–146)
TOTAL PROTEIN: 7 G/DL (ref 6.4–8.3)
TRIGL SERPL-MCNC: 160 MG/DL
VLDLC SERPL CALC-MCNC: 32 MG/DL
WBC # BLD: 5.1 K/UL (ref 4.5–11.5)

## 2025-04-08 PROCEDURE — G8417 CALC BMI ABV UP PARAM F/U: HCPCS | Performed by: INTERNAL MEDICINE

## 2025-04-08 PROCEDURE — 99214 OFFICE O/P EST MOD 30 MIN: CPT | Performed by: INTERNAL MEDICINE

## 2025-04-08 PROCEDURE — 1036F TOBACCO NON-USER: CPT | Performed by: INTERNAL MEDICINE

## 2025-04-08 PROCEDURE — 3074F SYST BP LT 130 MM HG: CPT | Performed by: INTERNAL MEDICINE

## 2025-04-08 PROCEDURE — 3078F DIAST BP <80 MM HG: CPT | Performed by: INTERNAL MEDICINE

## 2025-04-08 PROCEDURE — 1159F MED LIST DOCD IN RCRD: CPT | Performed by: INTERNAL MEDICINE

## 2025-04-08 PROCEDURE — G8400 PT W/DXA NO RESULTS DOC: HCPCS | Performed by: INTERNAL MEDICINE

## 2025-04-08 PROCEDURE — G8427 DOCREV CUR MEDS BY ELIG CLIN: HCPCS | Performed by: INTERNAL MEDICINE

## 2025-04-08 PROCEDURE — 3023F SPIROM DOC REV: CPT | Performed by: INTERNAL MEDICINE

## 2025-04-08 PROCEDURE — 1123F ACP DISCUSS/DSCN MKR DOCD: CPT | Performed by: INTERNAL MEDICINE

## 2025-04-08 PROCEDURE — 1090F PRES/ABSN URINE INCON ASSESS: CPT | Performed by: INTERNAL MEDICINE

## 2025-04-08 PROCEDURE — 36415 COLL VENOUS BLD VENIPUNCTURE: CPT | Performed by: INTERNAL MEDICINE

## 2025-04-08 PROCEDURE — 3017F COLORECTAL CA SCREEN DOC REV: CPT | Performed by: INTERNAL MEDICINE

## 2025-04-08 RX ORDER — FUROSEMIDE 20 MG/1
20 TABLET ORAL DAILY
Qty: 90 TABLET | Refills: 0 | Status: SHIPPED | OUTPATIENT
Start: 2025-04-08

## 2025-04-08 RX ORDER — LANOLIN ALCOHOL/MO/W.PET/CERES
400 CREAM (GRAM) TOPICAL DAILY
Qty: 90 TABLET | Refills: 0 | Status: SHIPPED | OUTPATIENT
Start: 2025-04-08

## 2025-04-08 RX ORDER — METOPROLOL SUCCINATE 25 MG/1
25 TABLET, EXTENDED RELEASE ORAL DAILY
Qty: 90 TABLET | Refills: 0 | Status: SHIPPED | OUTPATIENT
Start: 2025-04-08

## 2025-04-08 RX ORDER — ALBUTEROL SULFATE 90 UG/1
2 INHALANT RESPIRATORY (INHALATION) EVERY 6 HOURS PRN
Qty: 18 G | Refills: 2 | Status: SHIPPED | OUTPATIENT
Start: 2025-04-08

## 2025-04-08 ASSESSMENT — ENCOUNTER SYMPTOMS
ABDOMINAL PAIN: 0
SHORTNESS OF BREATH: 0
COUGH: 0
VOMITING: 0
NAUSEA: 0
DIARRHEA: 0

## 2025-04-08 NOTE — TELEPHONE ENCOUNTER
Patient wanted to let Dr. Julio C Price know that she is going to have a EKG and a Echo at [facility] the day after Easter. It has been ordered by her Valve Specialist. Thank you.

## 2025-04-08 NOTE — PROGRESS NOTES
Neurological:  Negative for tremors and seizures.        Past Medical/Surgical Hx;  Reviewed with patient      Diagnosis Date    A-fib (HCC)     ARF (acute renal failure)     Cancer (HCC)     lymphoma    Cardiac dysfunction     Chronic bronchitis (HCC)     Diabetes mellitus (HCC)     Diverticulitis     History of deep vein thrombosis 2016    Hx of blood clots     Poor venous access 2016    S/P AVR     S/P IVC filter 2016    Stage 3b chronic kidney disease (HCC)     Traumatic seroma of left thigh 2016    Ventilator dependent (HCC)      Past Surgical History:   Procedure Laterality Date    AORTIC VALVE REPLACEMENT      CARDIAC SURGERY      \"homograph\" valve replacement    CATARACT EXTRACTION W/ INTRAOCULAR LENS IMPLANT Left     COLONOSCOPY      ENDOSCOPY, COLON, DIAGNOSTIC      GASTROSTOMY TUBE PLACEMENT  2016    THORACENTESIS  10/14/2016    VENA CAVA FILTER PLACEMENT Right 10/14/2016       Past Family Hx:  Reviewed with patient      Problem Relation Age of Onset    Cancer Father         dies age 69 of bowel obstruction. h/o enlarged heart    Cancer Sister         breast cancer  at 45    Diabetes Sister     Other Brother         kidney failure    Heart Failure Brother     Cancer Maternal Grandmother         colon    Cancer Paternal Grandmother        Social Hx:  Reviewed with patient  Social History     Tobacco Use    Smoking status: Never    Smokeless tobacco: Never   Substance Use Topics    Alcohol use: No       OBJECTIVE  /70   Pulse 74   Temp 98.6 °F (37 °C) (Temporal)   Resp 16   Ht 1.626 m (5' 4\")   Wt 93.4 kg (206 lb)   SpO2 97%   BMI 35.36 kg/m²     Problem List:  Lula does not have any pertinent problems on file.    PHYS EX:  Physical Exam  Eyes:      Extraocular Movements: Extraocular movements intact.      Pupils: Pupils are equal, round, and reactive to light.   Neck:      Thyroid: No thyromegaly.      Vascular: No carotid bruit or JVD.   Cardiovascular:

## 2025-04-10 ENCOUNTER — RESULTS FOLLOW-UP (OUTPATIENT)
Dept: PRIMARY CARE CLINIC | Age: 75
End: 2025-04-10

## 2025-04-22 ENCOUNTER — TELEPHONE (OUTPATIENT)
Dept: PRIMARY CARE CLINIC | Age: 75
End: 2025-04-22

## 2025-04-22 NOTE — TELEPHONE ENCOUNTER
Patient wanted to let Dr. Julio C Price know that she is scheduled with the [facility] to have her third aortic valve replacement done as soon as possible. She wanted to keep him updated because she feels comfortable with him and trusts him. Thank you.

## 2025-07-24 ENCOUNTER — CARE COORDINATION (OUTPATIENT)
Dept: CARE COORDINATION | Age: 75
End: 2025-07-24

## 2025-07-24 DIAGNOSIS — Z95.2 S/P TAVR (TRANSCATHETER AORTIC VALVE REPLACEMENT): Primary | ICD-10-CM

## 2025-07-24 NOTE — CARE COORDINATION
Care Transitions Note    Initial Call - Call within 2 business days of discharge: Yes    Patient Current Location:  Home: [address] [place] [address]    Care Transition Nurse contacted the patient by telephone to perform post hospital discharge assessment, verified name and  as identifiers.  Provided introduction to self, and explanation of the Care Transition Nurse role.    Patient: Lula Conn    Patient : 1950   MRN: [id number]    Reason for Admission: S/p TAVR   Discharge Date: 3/5/25  RURS: Readmission Risk Score: 10.4      Last Discharge Facility       Date Complaint Diagnosis Description Type Department Provider    3/2/25  Acute on chronic diastolic congestive heart failure (HCC) Admission (Discharged) Trang Pavon MD    3/2/25 Shortness of Breath Acute respiratory failure with hypoxia (HCC) ... ED (TRANSFER) [facility] Belinda Mosher, DO            Was this an external facility discharge? Yes. Discharge Date: 25. Facility Name: [facility]    Additional needs identified to be addressed with provider   No current PCP to update med list to as pt is not established.  Clindamycin 300 mg Q8hrs x15 doses  Lasix 20 mg BID  Amlodipine 10 mg daily  Crestor 20 mg QHS  Farxiga 10 mg daily  Zetia 10 mg daily,  Aspirin 81 mg daily  No longer on Metoprolol   No longer on Mag Ox (Takes Magnesium taurate 2 tabs at night OTC instead         Method of communication with provider: none.    Patients top risk factors for readmission: medical condition-DM, CHF, HTN, COPD, CABG, TAVR, A-fib, DVT, CAD, multiple health system providers, polypharmacy, and utilization of services    Interventions to address risk factors:   Education: Reinforce Post TAVR care instructions as per [facility] discharge instructions~Pt confirms that she has information to review  F/u with Valve clinic at [facility] on 25 as scheduled; EKG prior~Pt aware and has instructions  Schedule a HFU appt with PCP~Pt states

## 2025-07-26 ENCOUNTER — APPOINTMENT (OUTPATIENT)
Dept: GENERAL RADIOLOGY | Age: 75
End: 2025-07-26
Payer: MEDICARE

## 2025-07-26 ENCOUNTER — HOSPITAL ENCOUNTER (EMERGENCY)
Age: 75
Discharge: HOME | End: 2025-07-26
Attending: EMERGENCY MEDICINE
Payer: MEDICARE

## 2025-07-26 VITALS
WEIGHT: 204 LBS | RESPIRATION RATE: 20 BRPM | OXYGEN SATURATION: 94 % | TEMPERATURE: 98.3 F | DIASTOLIC BLOOD PRESSURE: 76 MMHG | HEART RATE: 79 BPM | BODY MASS INDEX: 36.14 KG/M2 | SYSTOLIC BLOOD PRESSURE: 144 MMHG | HEIGHT: 63 IN

## 2025-07-26 DIAGNOSIS — R07.9 CHEST PAIN, UNSPECIFIED TYPE: Primary | ICD-10-CM

## 2025-07-26 LAB
ALBUMIN SERPL-MCNC: 4.4 G/DL (ref 3.5–5.2)
ALP SERPL-CCNC: 63 U/L (ref 35–104)
ALT SERPL-CCNC: 24 U/L (ref 0–35)
ANION GAP SERPL CALCULATED.3IONS-SCNC: 12 MMOL/L (ref 7–16)
AST SERPL-CCNC: 29 U/L (ref 0–35)
BASOPHILS # BLD: 0.05 K/UL (ref 0–0.2)
BASOPHILS NFR BLD: 1 % (ref 0–2)
BILIRUB SERPL-MCNC: 0.5 MG/DL (ref 0–1.2)
BNP SERPL-MCNC: 322 PG/ML (ref 0–450)
BUN SERPL-MCNC: 37 MG/DL (ref 8–23)
CALCIUM SERPL-MCNC: 9.9 MG/DL (ref 8.8–10.2)
CHLORIDE SERPL-SCNC: 99 MMOL/L (ref 98–107)
CK SERPL-CCNC: 62 U/L (ref 0–170)
CO2 SERPL-SCNC: 29 MMOL/L (ref 22–29)
CREAT SERPL-MCNC: 1.7 MG/DL (ref 0.5–1)
EOSINOPHIL # BLD: 0.36 K/UL (ref 0.05–0.5)
EOSINOPHILS RELATIVE PERCENT: 5 % (ref 0–6)
ERYTHROCYTE [DISTWIDTH] IN BLOOD BY AUTOMATED COUNT: 13.5 % (ref 11.5–15)
GFR, ESTIMATED: 30 ML/MIN/1.73M2
GLUCOSE SERPL-MCNC: 114 MG/DL (ref 74–99)
HCT VFR BLD AUTO: 42.5 % (ref 34–48)
HGB BLD-MCNC: 14.4 G/DL (ref 11.5–15.5)
IMM GRANULOCYTES # BLD AUTO: <0.03 K/UL (ref 0–0.58)
IMM GRANULOCYTES NFR BLD: 0 % (ref 0–5)
LACTATE BLDV-SCNC: 0.7 MMOL/L (ref 0.5–2.2)
LIPASE SERPL-CCNC: 88 U/L (ref 13–60)
LYMPHOCYTES NFR BLD: 1.1 K/UL (ref 1.5–4)
LYMPHOCYTES RELATIVE PERCENT: 17 % (ref 20–42)
MCH RBC QN AUTO: 29.9 PG (ref 26–35)
MCHC RBC AUTO-ENTMCNC: 33.9 G/DL (ref 32–34.5)
MCV RBC AUTO: 88.2 FL (ref 80–99.9)
MONOCYTES NFR BLD: 0.51 K/UL (ref 0.1–0.95)
MONOCYTES NFR BLD: 8 % (ref 2–12)
NEUTROPHILS NFR BLD: 69 % (ref 43–80)
NEUTS SEG NFR BLD: 4.63 K/UL (ref 1.8–7.3)
PLATELET # BLD AUTO: 165 K/UL (ref 130–450)
PMV BLD AUTO: 10.1 FL (ref 7–12)
POTASSIUM SERPL-SCNC: 4.3 MMOL/L (ref 3.5–5.1)
PROT SERPL-MCNC: 8.1 G/DL (ref 6.4–8.3)
RBC # BLD AUTO: 4.82 M/UL (ref 3.5–5.5)
SODIUM SERPL-SCNC: 139 MMOL/L (ref 136–145)
TROPONIN I SERPL HS-MCNC: 38 NG/L (ref 0–14)
TROPONIN I SERPL HS-MCNC: 42 NG/L (ref 0–14)
WBC OTHER # BLD: 6.7 K/UL (ref 4.5–11.5)

## 2025-07-26 PROCEDURE — 83605 ASSAY OF LACTIC ACID: CPT

## 2025-07-26 PROCEDURE — 83880 ASSAY OF NATRIURETIC PEPTIDE: CPT

## 2025-07-26 PROCEDURE — 85025 COMPLETE CBC W/AUTO DIFF WBC: CPT

## 2025-07-26 PROCEDURE — 99285 EMERGENCY DEPT VISIT HI MDM: CPT

## 2025-07-26 PROCEDURE — 93005 ELECTROCARDIOGRAM TRACING: CPT | Performed by: EMERGENCY MEDICINE

## 2025-07-26 PROCEDURE — 71045 X-RAY EXAM CHEST 1 VIEW: CPT

## 2025-07-26 PROCEDURE — 82550 ASSAY OF CK (CPK): CPT

## 2025-07-26 PROCEDURE — 84484 ASSAY OF TROPONIN QUANT: CPT

## 2025-07-26 PROCEDURE — 80053 COMPREHEN METABOLIC PANEL: CPT

## 2025-07-26 PROCEDURE — 83690 ASSAY OF LIPASE: CPT

## 2025-07-26 RX ORDER — AMLODIPINE BESYLATE 10 MG/1
10 TABLET ORAL DAILY
COMMUNITY

## 2025-07-26 RX ORDER — CLINDAMYCIN HYDROCHLORIDE 300 MG/1
300 CAPSULE ORAL 3 TIMES DAILY
COMMUNITY

## 2025-07-26 RX ORDER — DAPAGLIFLOZIN 10 MG/1
10 TABLET, FILM COATED ORAL EVERY MORNING
COMMUNITY

## 2025-07-26 RX ORDER — HYDRALAZINE HYDROCHLORIDE 10 MG/1
10 TABLET, FILM COATED ORAL PRN
COMMUNITY

## 2025-07-26 RX ORDER — EZETIMIBE 10 MG/1
10 TABLET ORAL DAILY
COMMUNITY

## 2025-07-26 RX ORDER — ROSUVASTATIN CALCIUM 20 MG/1
20 TABLET, COATED ORAL DAILY
COMMUNITY

## 2025-07-26 RX ORDER — ASPIRIN 81 MG/1
81 TABLET ORAL DAILY
COMMUNITY

## 2025-07-26 ASSESSMENT — PAIN - FUNCTIONAL ASSESSMENT: PAIN_FUNCTIONAL_ASSESSMENT: NONE - DENIES PAIN

## 2025-07-26 NOTE — ED PROVIDER NOTES
[facility] EMERGENCY DEPARTMENT  EMERGENCY DEPARTMENT ENCOUNTER        Pt Name: Lula Conn  MRN: [id number]  Birthdate 1950  Date of evaluation: 7/26/2025  Provider: Belinda Mosher DO  PCP: Sushma Scott MD  Note Started: 9:11 AM EDT 7/26/25    CHIEF COMPLAINT       Chief Complaint   Patient presents with    Medication Reaction     Aortic valve replacement on Tuesday at [facility].  Placed on clindamycin.  Having chest pain last night that started last night.  Intermittent x 4 with nausea.  No chest pain today.         HISTORY OF PRESENT ILLNESS: 1 or more Elements   History From: patient and s/o    Limitations to history : None    Lula Conn is a 75 y.o. female who presents with sharp chest pains, nausea and overall feeling unwell since starting clindamycin after her heart valve replacement 4 days ago.   reports she started feeling this while she was under anesthesia and states she was given the clindamycin during surgery but she was not awake enough to tell people how she was feeling with it.  She states she reacts to certain medications, including antibiotics, and this fashion.  She has been feeling the symptoms intermittently since taking the clindamycin, last episode was last night.  She reports she did not take the medication this morning and has not had any symptoms.  She presents requesting evaluation of the symptoms as well as an evaluation of her right groin wound.  The complaint has been persistent, moderate in severity, and worsened by nothing.  Patient denies fever/chills, sore throat, cough, congestion,  shortness of breath, edema, headache, visual disturbances, focal paresthesias, focal weakness, abdominal pain,  vomiting, diarrhea, constipation, dysuria, hematuria, trauma, neck or back pain, rash or other complaints.        Nursing Notes were all reviewed and agreed with or any disagreements were addressed in the HPI.    REVIEW OF

## 2025-07-27 LAB
EKG ATRIAL RATE: 88 BPM
EKG P AXIS: -88 DEGREES
EKG P-R INTERVAL: 150 MS
EKG Q-T INTERVAL: 436 MS
EKG QRS DURATION: 162 MS
EKG QTC CALCULATION (BAZETT): 527 MS
EKG R AXIS: 266 DEGREES
EKG T AXIS: 47 DEGREES
EKG VENTRICULAR RATE: 88 BPM

## 2025-07-27 PROCEDURE — 93010 ELECTROCARDIOGRAM REPORT: CPT | Performed by: INTERNAL MEDICINE

## 2025-07-29 ENCOUNTER — CARE COORDINATION (OUTPATIENT)
Dept: CARE COORDINATION | Age: 75
End: 2025-07-29

## 2025-07-29 NOTE — CARE COORDINATION
Care Transitions Note    Follow Up Call     Attempted to reach patient for transitions of care follow up.  Unable to reach patient.      Outreach Attempts:   HIPAA compliant voicemail left for patient.     Care Summary Note:     First attempt made to contact patient for ED/Subsequent Care Transition call. CTN left HIPAA compliant message requesting return call.      Follow Up Appointment:   Future Appointments         Provider Specialty Dept Phone    8/8/2025 10:15 AM Sushma Scott MD Primary [facility] [phone]    10/21/2025 11:15 AM Sushma Scott MD [facility] [phone]            Plan for follow-up on next business day.  based on severity of symptoms and risk factors.   Plan for next call:    ED F/U call   Symptom check: CP, palpitations, nausea  Medications review: did Pt stop Clindamycin? Did Pt contact Cardiothoracic surgeon for replacement?  Procedure sites healing?  Review [facility] Cardio f/u 7/30/25.  PCP HFU scheduled?    Mayra Castro LPN

## 2025-07-30 ENCOUNTER — CARE COORDINATION (OUTPATIENT)
Dept: CARE COORDINATION | Age: 75
End: 2025-07-30

## 2025-07-30 NOTE — CARE COORDINATION
Care Transitions Note    Follow Up Call     Attempted to reach patient for transitions of care follow up.  Unable to reach patient.      Outreach Attempts:   Unable to leave message. Hang up x 2.    Care Summary Note:     [facility] HFU completed today 7/30/25  1. Hospital discharge follow-up (Z09)  S/P TAVR (transcatheter aortic valve replacement) (Z95.2)  Patient is recovering well post-TAVR with significant improvement in symptoms such as dyspnea and chest heaviness. EKGs show consistent right bundle branch block. Groin site healing appropriately with expected bruising. Patient is ambulating well, able to walk over a mile without difficulty.  - Advised against elective procedures, including cataract surgery, for 6 months to minimize infection risk.  - Scheduled follow-up in October for comprehensive evaluation including echocardiogram, lab work, and EKG.  - Discussed potential Watchman procedure; will follow up with Dr. Sepulveda regarding scheduling.  - Patient can resume normal activities and travel.  - Advised to avoid baths for two weeks to allow groin site to heal completely.    2. Primary hypertension (I10)  Blood pressure readings slightly elevated; patient attributes this to situational factors. Currently on amlodipine 10 mg daily which was just increased and hydralazine as needed, though hydralazine has not been used since before the TAVR procedure.  - Instructed patient to monitor blood pressure at home twice daily: one hour after taking amlodipine in the morning and before bed.  - Patient to record readings for one week and send results via [facility].  - If readings remain consistently in the 140s-150s, medication adjustments will be considered.    3. History of atrial fibrillation (Z86.79)  Patient has a history of atrial fibrillation with previous episodes of tachycardia. Currently not on anticoagulation therapy due to personal preference. Not currently in atrial fibrillation. Will await results of [facility]

## 2025-08-07 ENCOUNTER — CARE COORDINATION (OUTPATIENT)
Dept: CARE COORDINATION | Age: 75
End: 2025-08-07

## 2025-08-08 ENCOUNTER — OFFICE VISIT (OUTPATIENT)
Dept: PRIMARY CARE CLINIC | Age: 75
End: 2025-08-08

## 2025-08-08 ENCOUNTER — TELEPHONE (OUTPATIENT)
Dept: PRIMARY CARE CLINIC | Age: 75
End: 2025-08-08

## 2025-08-08 VITALS
HEART RATE: 74 BPM | SYSTOLIC BLOOD PRESSURE: 115 MMHG | TEMPERATURE: 98.4 F | RESPIRATION RATE: 18 BRPM | BODY MASS INDEX: 37.03 KG/M2 | DIASTOLIC BLOOD PRESSURE: 76 MMHG | OXYGEN SATURATION: 94 % | HEIGHT: 63 IN | WEIGHT: 209 LBS

## 2025-08-08 DIAGNOSIS — E66.01 MORBID (SEVERE) OBESITY DUE TO EXCESS CALORIES (HCC): ICD-10-CM

## 2025-08-08 DIAGNOSIS — D72.810 LYMPHOPENIA: Primary | ICD-10-CM

## 2025-08-08 RX ORDER — ROSUVASTATIN CALCIUM 20 MG/1
20 TABLET, COATED ORAL DAILY
Qty: 90 TABLET | Refills: 1 | Status: SHIPPED | OUTPATIENT
Start: 2025-08-08

## 2025-08-08 RX ORDER — HYDRALAZINE HYDROCHLORIDE 10 MG/1
10 TABLET, FILM COATED ORAL 2 TIMES DAILY PRN
Qty: 90 TABLET | Refills: 1 | Status: SHIPPED | OUTPATIENT
Start: 2025-08-08

## 2025-08-08 RX ORDER — FUROSEMIDE 20 MG/1
20 TABLET ORAL DAILY
Qty: 90 TABLET | Refills: 1 | Status: SHIPPED | OUTPATIENT
Start: 2025-08-08

## 2025-08-08 RX ORDER — DAPAGLIFLOZIN 10 MG/1
10 TABLET, FILM COATED ORAL EVERY MORNING
Qty: 90 TABLET | Refills: 1 | Status: SHIPPED | OUTPATIENT
Start: 2025-08-08

## 2025-08-08 RX ORDER — HYDRALAZINE HYDROCHLORIDE 10 MG/1
10 TABLET, FILM COATED ORAL PRN
Qty: 90 TABLET | Refills: 1 | Status: SHIPPED | OUTPATIENT
Start: 2025-08-08 | End: 2025-08-08 | Stop reason: SDUPTHER

## 2025-08-08 RX ORDER — EZETIMIBE 10 MG/1
10 TABLET ORAL DAILY
Qty: 90 TABLET | Refills: 1 | Status: SHIPPED | OUTPATIENT
Start: 2025-08-08

## 2025-08-08 RX ORDER — AMLODIPINE BESYLATE 10 MG/1
10 TABLET ORAL DAILY
Qty: 90 TABLET | Refills: 1 | Status: SHIPPED | OUTPATIENT
Start: 2025-08-08

## 2025-08-10 PROBLEM — I82.509 LEG DVT (DEEP VENOUS THROMBOEMBOLISM), CHRONIC (HCC): Status: RESOLVED | Noted: 2017-01-18 | Resolved: 2025-08-10

## 2025-08-10 PROBLEM — R10.9 ABDOMINAL PAIN: Status: RESOLVED | Noted: 2023-12-22 | Resolved: 2025-08-10

## 2025-08-10 PROBLEM — I50.33 ACUTE ON CHRONIC DIASTOLIC CONGESTIVE HEART FAILURE (HCC): Status: RESOLVED | Noted: 2025-03-02 | Resolved: 2025-08-10

## 2025-08-10 PROBLEM — I82.729 ARM DVT (DEEP VENOUS THROMBOEMBOLISM), CHRONIC (HCC): Status: RESOLVED | Noted: 2017-01-18 | Resolved: 2025-08-10

## 2025-08-10 PROBLEM — I50.33 ACUTE ON CHRONIC HEART FAILURE WITH PRESERVED EJECTION FRACTION (HFPEF) (HCC): Chronic | Status: RESOLVED | Noted: 2025-03-02 | Resolved: 2025-08-10

## 2025-08-10 PROBLEM — N17.9 ACUTE KIDNEY INJURY SUPERIMPOSED ON CHRONIC KIDNEY DISEASE: Status: RESOLVED | Noted: 2024-04-02 | Resolved: 2025-08-10

## 2025-08-10 PROBLEM — J96.01 ACUTE HYPOXIC RESPIRATORY FAILURE (HCC): Status: RESOLVED | Noted: 2025-03-02 | Resolved: 2025-08-10

## 2025-08-10 PROBLEM — Z76.89 ENCOUNTER TO ESTABLISH CARE: Status: RESOLVED | Noted: 2022-08-08 | Resolved: 2025-08-10

## 2025-08-10 PROBLEM — K57.32 DIVERTICULITIS OF COLON: Status: RESOLVED | Noted: 2023-12-22 | Resolved: 2025-08-10

## 2025-08-10 PROBLEM — I48.91 ATRIAL FIBRILLATION WITH RAPID VENTRICULAR RESPONSE (HCC): Status: RESOLVED | Noted: 2024-03-30 | Resolved: 2025-08-10

## 2025-08-10 PROBLEM — N18.9 ACUTE KIDNEY INJURY SUPERIMPOSED ON CHRONIC KIDNEY DISEASE: Status: RESOLVED | Noted: 2024-04-02 | Resolved: 2025-08-10

## 2025-08-10 PROBLEM — J18.9 PNEUMONIA DUE TO INFECTIOUS ORGANISM: Status: RESOLVED | Noted: 2022-08-08 | Resolved: 2025-08-10

## 2025-08-11 LAB — PATHOLOGIST REVIEW: NORMAL

## 2025-08-14 ENCOUNTER — CARE COORDINATION (OUTPATIENT)
Dept: CARE COORDINATION | Age: 75
End: 2025-08-14

## 2025-08-17 ENCOUNTER — RESULTS FOLLOW-UP (OUTPATIENT)
Dept: PRIMARY CARE CLINIC | Age: 75
End: 2025-08-17

## 2025-08-21 ENCOUNTER — CARE COORDINATION (OUTPATIENT)
Dept: CARE COORDINATION | Age: 75
End: 2025-08-21

## 2025-08-22 ENCOUNTER — CARE COORDINATION (OUTPATIENT)
Dept: CARE COORDINATION | Age: 75
End: 2025-08-22

## 2025-09-02 ENCOUNTER — CARE COORDINATION (OUTPATIENT)
Dept: CARE COORDINATION | Age: 75
End: 2025-09-02